# Patient Record
Sex: FEMALE | Race: BLACK OR AFRICAN AMERICAN | Employment: FULL TIME | RURAL
[De-identification: names, ages, dates, MRNs, and addresses within clinical notes are randomized per-mention and may not be internally consistent; named-entity substitution may affect disease eponyms.]

---

## 2017-01-05 ENCOUNTER — TELEPHONE (OUTPATIENT)
Dept: FAMILY MEDICINE CLINIC | Age: 57
End: 2017-01-05

## 2017-01-05 NOTE — TELEPHONE ENCOUNTER
Patient called requesting a call back. States it is very important, did not get any other info.     Patient's phone number: [phone]

## 2017-01-06 ENCOUNTER — OFFICE VISIT (OUTPATIENT)
Dept: FAMILY MEDICINE CLINIC | Age: 57
End: 2017-01-06

## 2017-01-06 ENCOUNTER — HOSPITAL ENCOUNTER (OUTPATIENT)
Dept: CT IMAGING | Age: 57
Discharge: HOME | End: 2017-01-06
Attending: FAMILY MEDICINE
Payer: COMMERCIAL

## 2017-01-06 VITALS
HEART RATE: 64 BPM | HEIGHT: 67 IN | WEIGHT: 215.8 LBS | RESPIRATION RATE: 20 BRPM | OXYGEN SATURATION: 95 % | BODY MASS INDEX: 33.87 KG/M2 | SYSTOLIC BLOOD PRESSURE: 133 MMHG | DIASTOLIC BLOOD PRESSURE: 70 MMHG | TEMPERATURE: 97.9 F

## 2017-01-06 DIAGNOSIS — R55 SYNCOPE, UNSPECIFIED SYNCOPE TYPE: ICD-10-CM

## 2017-01-06 DIAGNOSIS — Z79.899 ENCOUNTER FOR LONG-TERM CURRENT USE OF MEDICATION: ICD-10-CM

## 2017-01-06 DIAGNOSIS — M21.619 BUNION OF GREAT TOE: ICD-10-CM

## 2017-01-06 DIAGNOSIS — S52.501D CLOSED FRACTURE OF DISTAL END OF RIGHT RADIUS WITH ROUTINE HEALING, UNSPECIFIED FRACTURE MORPHOLOGY, SUBSEQUENT ENCOUNTER: ICD-10-CM

## 2017-01-06 DIAGNOSIS — E55.9 VITAMIN D DEFICIENCY: ICD-10-CM

## 2017-01-06 DIAGNOSIS — E78.00 HYPERCHOLESTEROLEMIA: ICD-10-CM

## 2017-01-06 DIAGNOSIS — E04.9 THYROID GOITER: ICD-10-CM

## 2017-01-06 LAB
BILIRUB UR QL STRIP: NEGATIVE
GLUCOSE UR-MCNC: NORMAL MG/DL
KETONES P FAST UR STRIP-MCNC: NORMAL MG/DL
PH UR STRIP: 5 [PH] (ref 4.6–8)
PROT UR QL STRIP: NEGATIVE MG/DL
SP GR UR STRIP: 1.02 (ref 1–1.03)
UA UROBILINOGEN AMB POC: NORMAL (ref 0.2–1)
URINALYSIS CLARITY POC: CLEAR
URINALYSIS COLOR POC: YELLOW
URINE BLOOD POC: NEGATIVE
URINE LEUKOCYTES POC: NEGATIVE
URINE NITRITES POC: NEGATIVE

## 2017-01-06 PROCEDURE — 70470 CT HEAD/BRAIN W/O & W/DYE: CPT

## 2017-01-06 PROCEDURE — 74011636320 HC RX REV CODE- 636/320: Performed by: RADIOLOGY

## 2017-01-06 RX ORDER — KETOROLAC TROMETHAMINE 10 MG/1
10 TABLET, FILM COATED ORAL
COMMUNITY
Start: 2017-01-01 | End: 2017-10-18 | Stop reason: ALTCHOICE

## 2017-01-06 RX ADMIN — IOPAMIDOL 100 ML: 755 INJECTION, SOLUTION INTRAVENOUS at 16:00

## 2017-01-06 NOTE — PATIENT INSTRUCTIONS
Preventing Outdoor Falls: Care Instructions  Your Care Instructions  Worries about falls don't need to keep you indoors. Outdoor activities like walking have big benefits for your health. You will need to watch your step and learn a few safety measures. If you are worried about having a fall outdoors, ask your doctor about exercises, classes, or physical therapy that may help. You can learn ways to gain strength, flexibility, and balance. Ask if it might help to use a cane or walker. You can make your time outdoors safer with a few simple measures. Follow-up care is a key part of your treatment and safety. Be sure to make and go to all appointments, and call your doctor if you are having problems. It's also a good idea to know your test results and keep a list of the medicines you take. How can you prevent falls outdoors? · Wear shoes with firm soles and low heels. If you have to walk on an icy surface, use grippers that can be worn over your shoes in bad weather. · Be extra careful if weather is bad. Walk on the grass when the sidewalks are slick. If you live in a place that gets snow and ice in the winter, sprinkle salt on slippery stairs and sidewalks. · Be careful getting on or off buses and trains or getting in and out of cars. If handrails are available, use them. · Be careful when you cross the street. Look for crosswalks or places where curb cuts or ramps are present. · Try not to hurry, especially if you are carrying something. · Be cautious in parking lots or garages. There may be curbs or changes in pavement, or the height of the pavement may vary. · Make sure to wear the correct eyeglasses, if you need them. Reading glasses or bifocals can make it harder to see hazards that might be in your way. · If you are walking outdoors for exercise, try to:  ¨ Walk in well-lighted, well-maintained areas. These include high school or college tracks, shopping malls, and public spaces.   ¨ Walk with a partner. ¨ Watch out for cracked sidewalks, curbs, changes in the height of the pavement, exposed tree roots, and debris such as fallen leaves or branches. Where can you learn more? Go to http://eulogio-lior.info/. Enter W571 in the search box to learn more about \"Preventing Outdoor Falls: Care Instructions. \"  Current as of: August 4, 2016  Content Version: 11.1  © 0783-2377 [employer]. Care instructions adapted under license by Interact[employer] (which disclaims liability or warranty for this information). If you have questions about a medical condition or this instruction, always ask your healthcare professional. [place] [address] any warranty or liability for your use of this information.

## 2017-01-06 NOTE — PROGRESS NOTES
Identified pt with two pt identifiers(name and ). Chief Complaint   Patient presents with    Fall     17 ED [place] - she broke rt wrist -     Pre-op Exam     Dr Lorie Verdugo 17 at [facility] 86 Loss     she has been having trouble with memory - has been getting worse    Loss of Consciousness     syncopy X 3         Health Maintenance Due   Topic    Hepatitis C Screening     Pneumococcal 19-64 Medium Risk (1 of 1 - PPSV23)    FOOT EXAM Q1     HEMOGLOBIN A1C Q6M        Wt Readings from Last 3 Encounters:   17 215 lb 12.8 oz (97.9 kg)   16 211 lb (95.7 kg)   16 214 lb 9.6 oz (97.3 kg)     Temp Readings from Last 3 Encounters:   17 97.9 °F (36.6 °C) (Oral)   16 97.7 °F (36.5 °C) (Oral)   16 97.9 °F (36.6 °C) (Oral)     BP Readings from Last 3 Encounters:   17 133/70   16 117/73   16 114/60     Pulse Readings from Last 3 Encounters:   17 64   16 69   16 85         Learning Assessment:  :     Learning Assessment 2014   PRIMARY LEARNER Patient   HIGHEST LEVEL OF EDUCATION - PRIMARY LEARNER  2 YEARS OF COLLEGE   BARRIERS PRIMARY LEARNER NONE   CO-LEARNER CAREGIVER No   PRIMARY LANGUAGE ENGLISH   LEARNER PREFERENCE PRIMARY DEMONSTRATION   ANSWERED BY self   RELATIONSHIP SELF       Depression Screening:  :     PHQ 2 / 9, over the last two weeks 2017   Little interest or pleasure in doing things Not at all   Feeling down, depressed or hopeless Not at all   Total Score PHQ 2 0       Fall Risk Assessment:  :     Fall Risk Assessment, last 12 mths 2017   Able to walk? Yes   Fall in past 12 months? Yes   Fall with injury? Yes   Number of falls in past 12 months 3   Fall Risk Score 4       Abuse Screening:  :     Abuse Screening Questionnaire 2017   Do you ever feel afraid of your partner? N N N   Are you in a relationship with someone who physically or mentally threatens you?  N N N   Is it safe for you to go home? Ryder Lopez       Coordination of Care Questionnaire:  :     1) Have you been to an emergency room, urgent care clinic since your last visit? yes [address] 1/1/17  Hospitalized since your last visit? no             2) Have you seen or consulted any other health care providers outside of [address] [place] [address] since your last visit? yes  Dr Levon Webb 1/4/17 (Include any pap smears or colon screenings in this section.)    3) Do you have an Advance Directive on file? no  Are you interested in receiving information about Advance Directives? Yes paperwork given and explained    Reviewed record in preparation for visit and have obtained necessary documentation. Medication reconciliation up to date and corrected with patient at this time. EEG - Monday [address] [place] [address] at 8:45am be there at 8:15am - hair clean no oils  CT head - today at 3pm be there at 2:30pm  No advil.

## 2017-01-06 NOTE — PROGRESS NOTES
HISTORY OF PRESENT ILLNESS  Tika Neumann is a 60 y.o. female. HPI  FU chronic med conditions:  DM, HTN, thyroid nodule. She has been having syncopal spells in last 9 months x 3. Most recently 1/1 fell and broke right wrist.  Scheduled for ORIF surgery 1/12/17. Seen at [facility] ER. No labs, no EKG. She has no signs of feeling faint. Unwitnessed x 2. She wakes up on the floor. No loss of bowel/ bladder function. No new headaches. Her granddaughter witnessed the most recent faint and did not see any abnormal activity. She wakes up confused, wondering where and what happened. Clears up in minutes. Denies palpitations or CP. Pt saw ENDO in Sept. Missed FU visit in December to check on thyroid nodule. Review of Systems   Musculoskeletal: Positive for falls and joint pain. Neurological: Positive for loss of consciousness. All other systems reviewed and are negative. Past Medical History   Diagnosis Date    Diabetes ([facility] [address] 11/8/2010    GERD (gastroesophageal reflux disease)     Heart murmur 11/8/2010    Sleep apnea 11/8/2010     Past Surgical History   Procedure Laterality Date    Hx heent       uvuloplasty    Hx hysterectomy       Current Outpatient Prescriptions   Medication Sig Dispense Refill    GLYXAMBI 25-5 mg tab TAKE ONE TABLET BY MOUTH ONCE DAILY 30 Tab 0    omeprazole (PRILOSEC) 20 mg capsule TAKE ONE CAPSULE BY MOUTH ONCE DAILY 30 Cap 5    VITAMIN D2 50,000 unit capsule TAKE ONE CAPSULE BY MOUTH TWICE A WEEK 9 Cap 3    pravastatin (PRAVACHOL) 40 mg tablet Take 1 Tab by mouth nightly. Indications: HYPERCHOLESTEROLEMIA 90 Tab 1    levocetirizine (XYZAL) 5 mg tablet Take 1 Tab by mouth daily. Indications: SEASONAL ALLERGIC RHINITIS 30 Tab 12    glipiZIDE SR (GLUCOTROL) 10 mg CR tablet Take 2 Tabs by mouth daily. 60 Tab 5    losartan (COZAAR) 25 mg tablet Take  by mouth daily.       BD AUTOSHIELD DUO PEN NEEDLE 30 gauge x 3/16\" ndle       glucose blood VI test strips (BLOOD GLUCOSE TEST) strip Accu Check Siomara Smart View - test strips DX E11.9 Test blood sugar twice daily. 200 Strip 3    Lancets misc Accu Check Siomara Smart View Fast Click Drum. DX E11.9 Test blood sugar twice daily 200 Each 3    insulin glargine (TOUJEO SOLOSTAR) 300 unit/mL (1.5 mL) inpn 90 Units by SubCUTAneous route daily. Indications: increase by 4 units every 2 days am sugar over 150      Blood-Glucose Meter monitoring kit ONE TOUCH ULTRA. Test once a day. .02 1 kit 0    aspirin delayed-release 81 mg tablet Take 81 mg by mouth daily.  ketorolac (TORADOL) 10 mg tablet Take 10 mg by mouth every six (6) hours as needed.  pneumococcal 23-arelis ps vaccine (PNEUMOVAX 23) 25 mcg/0.5 mL syrg One dose 1 Syringe 0    cyclobenzaprine (FLEXERIL) 10 mg tablet Take 1 Tab by mouth nightly. 30 Tab 3    triamcinolone (NASACORT) 55 mcg nasal inhaler 2 Sprays by Both Nostrils route daily. 16.5 g 5     Social History     Social History    Marital status:      Spouse name: N/A    Number of children: N/A    Years of education: N/A     Occupational History    Not on file. Social History Main Topics    Smoking status: Never Smoker    Smokeless tobacco: Never Used    Alcohol use Yes      Comment: ocassional    Drug use: No    Sexual activity: Yes     Partners: Male     Other Topics Concern    Not on file     Social History Narrative     Family History   Problem Relation Age of Onset    Hypertension Mother     Diabetes Father     Heart Disease Father     Diabetes Maternal Grandmother     Diabetes Paternal Grandmother        Visit Vitals    /70 (BP 1 Location: Left arm, BP Patient Position: Sitting)    Pulse 64    Temp 97.9 °F (36.6 °C) (Oral)    Resp 20    Ht 5' 7\" (1.702 m)    Wt 215 lb 12.8 oz (97.9 kg)    LMP 10/14/1987    SpO2 95%    BMI 33.8 kg/m2       Physical Exam   Constitutional: She is oriented to person, place, and time. She appears well-developed and well-nourished. Eyes: Conjunctivae are normal.   Cardiovascular: Normal rate, regular rhythm and normal heart sounds. Pulmonary/Chest: Effort normal and breath sounds normal.   Neurological: She is alert and oriented to person, place, and time. Vitals reviewed. Diabetic foot exam:     Left:     Filament test normal sensation with micro filament   Pulse DP: 2+ (normal)   Pulse PT: 2+ (normal)   Deformities: Yes - bunion  Right:    Filament test normal sensation with micro filament   Pulse DP: 2+ (normal)   Pulse PT: 2+ (normal)   Deformities: Yes - bunion  ASSESSMENT and PLAN    ICD-10-CM ICD-9-CM    1. Uncontrolled type 2 diabetes mellitus without complication, with long-term current use of insulin ([facility]) E11.65 250.02  DIABETES FOOT EXAM    Z79.4 V58.67 HEMOGLOBIN A1C WITH EAG   2. Pre-op exam Z01.818 V72.84 AMB POC URINALYSIS DIP STICK AUTO W/O MICRO   3. Thyroid goiter E04.9 240.9 TSH 3RD GENERATION      T4, FREE   4. Vitamin D deficiency E55.9 268.9 VITAMIN D, 25 HYDROXY   5. Hypercholesterolemia E78.00 272.0 LIPID PANEL   6. Closed fracture of distal end of right radius with routine healing, unspecified fracture morphology, subsequent encounter S52.501D V54.12    7. Syncope, unspecified syncope type R55 780.2 AMB POC EKG ROUTINE W/ 12 LEADS, INTER & REP      REFERRAL TO CARDIOLOGY      REFERRAL TO NEUROLOGY      EEG      CT HEAD W WO CONT   8. Encounter for long-term current use of medication Z79.899 V58.69 CBC WITH AUTOMATED DIFF      METABOLIC PANEL, COMPREHENSIVE   9. Bunion of great toe M21.619 727.1      Pt needs pre-op clearance for ORIF. I want her to be seen by Cardiology and Neurology for clearance. Order EEG. Rule out seizure disorder. Order CT head. Labs drawn.

## 2017-01-06 NOTE — MR AVS SNAPSHOT
Visit Information Date & Time Provider Department Dept. Phone Encounter #  
 [id number]  2:24 AM Yosef Olmstead Tal [phone] [id number] Your Appointments 1/10/2017  2:40 PM  
New Patient with Bob Pulido MD  
[facility] ([address]) Appt Note: prev Dr. Ashley Villagomez by Dr. Patel/ Gerardo pre op clearance  
 [address]  
[address] [address]  
  
    
 1/11/2017  2:00 PM  
New Patient with Johan Ness MD  
[facility] [address]) Appt Note: np eval syncopal episodes [address] [address]  
[phone]  
  
   
 [address]  
  
    
  
 1/13/2017  1:00 PM  
TRANSITIONAL CARE MANAGEMENT with Marianne Gray MD  
[address]) Appt Note: f/u from er visit [facility] [address] [facility] 860 1067 [facility][note 2] 13 [address] [facility] [address] Upcoming Health Maintenance Date Due Hepatitis C Screening 1960 Pneumococcal 19-64 Medium Risk (1 of 1 - PPSV23) 3/4/1977 FOOT EXAM Q1 8/27/2016 HEMOGLOBIN A1C Q6M 12/27/2016 MICROALBUMIN Q1 6/27/2017 LIPID PANEL Q1 6/27/2017 EYE EXAM RETINAL OR DILATED Q1 6/28/2017 BREAST CANCER SCRN MAMMOGRAM 1/8/2018 PAP AKA CERVICAL CYTOLOGY 6/27/2019 COLONOSCOPY 5/15/2025 DTaP/Tdap/Td series (2 - Td) 1/6/2027 Allergies as of 1/6/2017  Review Complete On: 1/0/3391 By: Marianne Gray MD  
  
 Severity Noted Reaction Type Reactions Lisinopril  02/05/2016    Cough Penicillin G  11/08/2010    Swelling Current Immunizations  Reviewed on 1/6/2017 No immunizations on file.   
  
 Reviewed by Marianne Gray MD on 1/6/2017 at  9:18 AM  
You Were Diagnosed With   
  
 Codes Comments Uncontrolled type 2 diabetes mellitus without complication, with long-term current use of insulin ([facility] [address]    -  Primary ICD-10-CM: E11.65, Z79.4 ICD-9-CM: 250.02, V58.67 Pre-op exam     ICD-10-CM: D28.565 ICD-9-CM: V72.84 Thyroid goiter     ICD-10-CM: E04.9 ICD-9-CM: 240.9 Vitamin D deficiency     ICD-10-CM: E55.9 ICD-9-CM: 268.9 Hypercholesterolemia     ICD-10-CM: E78.00 ICD-9-CM: 272.0 Closed fracture of distal end of right radius with routine healing, unspecified fracture morphology, subsequent encounter     ICD-10-CM: S52.501D ICD-9-CM: V54.12 Syncope, unspecified syncope type     ICD-10-CM: R55 
ICD-9-CM: 780.2 Encounter for long-term current use of medication     ICD-10-CM: Z79.899 ICD-9-CM: V58.69 Vitals BP Pulse Temp Resp Height(growth percentile) Weight(growth percentile) 133/70 (BP 1 Location: Left arm, BP Patient Position: Sitting) 64 97.9 °F (36.6 °C) (Oral) 20 5' 7\" (1.702 m) 215 lb 12.8 oz (97.9 kg) LMP SpO2 BMI OB Status Smoking Status 10/14/1987 95% 33.8 kg/m2 Hysterectomy Never Smoker BMI and BSA Data Body Mass Index Body Surface Area  
 33.8 kg/m 2 2.15 m 2 Preferred Pharmacy Pharmacy Name Phone [facility] [address] [place] [address] [phone] Your Updated Medication List  
  
   
This list is accurate as of: 1/6/17 10:09 AM.  Always use your most recent med list.  
  
  
  
  
 aspirin delayed-release 81 mg tablet Take 81 mg by mouth daily. BD AUTOSHIELD DUO PEN NEEDLE 30 gauge x 3/16\" Ndle Generic drug:  pen needle,diabetic dual safty Blood-Glucose Meter monitoring kit ONE TOUCH ULTRA. Test once a day. .02  
  
 cyclobenzaprine 10 mg tablet Commonly known as:  FLEXERIL Take 1 Tab by mouth nightly. glipiZIDE SR 10 mg CR tablet Commonly known as:  GLUCOTROL XL Take 2 Tabs by mouth daily. glucose blood VI test strips strip Commonly known as:  blood glucose test  
Accu Check Siomara Smart View - test strips DX E11.9 Test blood sugar twice daily. GLYXAMBI 25-5 mg Tab Generic drug:  empagliflozin-linagliptin TAKE ONE TABLET BY MOUTH ONCE DAILY  
  
 ketorolac 10 mg tablet Commonly known as:  TORADOL Take 10 mg by mouth every six (6) hours as needed. Lancets Misc Accu Check Siomara Smart View Fast Click Drum. DX E11.9 Test blood sugar twice daily  
  
 levocetirizine 5 mg tablet Commonly known as:  Havery Benita Take 1 Tab by mouth daily. Indications: SEASONAL ALLERGIC RHINITIS  
  
 losartan 25 mg tablet Commonly known as:  COZAAR Take  by mouth daily. omeprazole 20 mg capsule Commonly known as:  PRILOSEC  
TAKE ONE CAPSULE BY MOUTH ONCE DAILY pneumococcal 23-arelis ps vaccine 25 mcg/0.5 mL injection Commonly known as:  PNEUMOVAX 23 One dose  
  
 pravastatin 40 mg tablet Commonly known as:  PRAVACHOL Take 1 Tab by mouth nightly. Indications: HYPERCHOLESTEROLEMIA TOUJEO SOLOSTAR 300 unit/mL (1.5 mL) Inpn Generic drug:  insulin glargine 90 Units by SubCUTAneous route daily. Indications: increase by 4 units every 2 days am sugar over 150  
  
 triamcinolone 55 mcg nasal inhaler Commonly known as:  NASACORT  
2 Sprays by Both Nostrils route daily. VITAMIN D2 50,000 unit capsule Generic drug:  ergocalciferol TAKE ONE CAPSULE BY MOUTH TWICE A WEEK We Performed the Following AMB POC EKG ROUTINE W/ 12 LEADS, INTER & REP [28068 CPT(R)] AMB POC URINALYSIS DIP STICK AUTO W/O MICRO [52703 CPT(R)] CBC WITH AUTOMATED DIFF [97695 CPT(R)] HEMOGLOBIN A1C WITH EAG [93523 CPT(R)]  DIABETES FOOT EXAM [HM7 Custom] LIPID PANEL [85126 CPT(R)] METABOLIC PANEL, COMPREHENSIVE [46899 CPT(R)] REFERRAL TO CARDIOLOGY [BKC15 Custom] Comments:  
 Please evaluate patient for preop clearance, recent syncopal episodes. REFERRAL TO NEUROLOGY [HZA04 Custom] Comments:  
 Please evaluate patient for syncopal episodes. T4, FREE [id number] CPT(R)] TSH 3RD GENERATION [79990 CPT(R)] VITAMIN D, 25 HYDROXY [id number] CPT(R)] To-Do List   
 01/06/2017 Imaging:  CT HEAD W WO CONT   
  
 01/06/2017 Neurology:  EEG   
  
 01/06/2017 3:00 PM  
  Appointment with [address] 2 at [facility] RAD CT ([phone]) CONTRAST STUDY: 1. The patient should not eat solid food four hours before the appointment but should be encouraged to drink clear liquids. 2. The patient will require IV access for contrast administration. 3. The patient should not take  Ibuprofen (Advil, Motrin, etc.) and Naproxen Sodium (Aleve, etc.)  on the day of the exam. Stopping non-steroidal anti-inflammatory agents (NSAIDs) like Ibuprofen decreases the risk of kidney damage from the x-ray contrast (dye). 4. Bring any non [facility] facility films/images pertaining to the area of interest with you on the day of appointment. 5. Bring current lab work if available(within last 90 days [facility]) ***If scheduled at [facility] iSTAT is not available, labs will need to be done before appointment*** 6. Check in at registration at least 30 minutes before appt time unless you were instructed to do otherwise. 7. If you have to drink oral contrast please pick it up any weekday prior to your appointment, if you cannot please check in 2 hrs  before appt time. 01/09/2017 8:45 AM  
  Appointment with EEG ROOM 2 [facility] at [facility] EEG ([phone]) The patient's hair must be clean without oils, mousse, and spray, no faisal or metal in the hair. Referral Information Referral ID Referred By Referred To  
  
 [id number] [facility] Not Available Visits Status Start Date End Date 1 New Request 1/6/17 1/6/18 If your referral has a status of pending review or denied, additional information will be sent to support the outcome of this decision. Referral ID Referred By Referred To [id number] [facility] Not Available Visits Status Start Date End Date 1 New Request 1/6/17 1/6/18 If your referral has a status of pending review or denied, additional information will be sent to support the outcome of this decision. Patient Instructions Preventing Outdoor Falls: Care Instructions Your Care Instructions Worries about falls don't need to keep you indoors. Outdoor activities like walking have big benefits for your health. You will need to watch your step and learn a few safety measures. If you are worried about having a fall outdoors, ask your doctor about exercises, classes, or physical therapy that may help. You can learn ways to gain strength, flexibility, and balance. Ask if it might help to use a cane or walker. You can make your time outdoors safer with a few simple measures. Follow-up care is a key part of your treatment and safety. Be sure to make and go to all appointments, and call your doctor if you are having problems. It's also a good idea to know your test results and keep a list of the medicines you take. How can you prevent falls outdoors? · Wear shoes with firm soles and low heels. If you have to walk on an icy surface, use grippers that can be worn over your shoes in bad weather. · Be extra careful if weather is bad. Walk on the grass when the sidewalks are slick. If you live in a place that gets snow and ice in the winter, sprinkle salt on slippery stairs and sidewalks. · Be careful getting on or off buses and trains or getting in and out of cars. If handrails are available, use them. · Be careful when you cross the street. Look for crosswalks or places where curb cuts or ramps are present. · Try not to hurry, especially if you are carrying something. · Be cautious in parking lots or garages. There may be curbs or changes in pavement, or the height of the pavement may vary. · Make sure to wear the correct eyeglasses, if you need them.  Reading glasses or bifocals can make it harder to see hazards that might be in your way. · If you are walking outdoors for exercise, try to: 
¨ Walk in well-lighted, well-maintained areas. These include high school or college tracks, shopping malls, and public spaces. ¨ Walk with a partner. ¨ Watch out for cracked sidewalks, curbs, changes in the height of the pavement, exposed tree roots, and debris such as fallen leaves or branches. Where can you learn more? Go to http://eulogio-lior.info/. Enter Y165 in the search box to learn more about \"Preventing Outdoor Falls: Care Instructions. \" Current as of: August 4, 2016 Content Version: 11.1 © 9574-2947 [employer] ([employer]). Care instructions adapted under license by Beat[employer] (which disclaims liability or warranty for this information). If you have questions about a medical condition or this instruction, always ask your healthcare professional. [place] [address] any warranty or liability for your use of this information. Introducing [facility]! Dear Manny Lepe: Thank you for requesting a [employer] account. Our records indicate that you already have an active ClearStar account. You can access your account anytime at [email or web address]. [email or web address] Did you know that you can access your hospital and ER discharge instructions at any time in ClearStar? You can also review all of your test results from your hospital stay or ER visit. Additional Information If you have questions, please visit the Frequently Asked Questions section of the ClearStar website at [email or web address]/. Remember, ClearStar is NOT to be used for urgent needs. For medical emergencies, dial 911. Now available from your iPhone and Android! Please provide this summary of care documentation to your next provider. Your primary care clinician is listed as Anny Thomas.  If you have any questions after today's visit, please call [phone].

## 2017-01-06 NOTE — LETTER
1/9/2017 9:30 AM 
 
Ms. Krystle Rosario [address] [facility] [address] Dear Krystle Rosario: 
 
Please find your most recent results below. Resulted Orders CT HEAD W WO CONT Narrative EXAM:  CT HEAD W WO CONT INDICATION:   fainting and confusion, R 55, COMPARISON: None. CONTRAST:   99 mL of Isovue-300. TECHNIQUE:CT of the head was performed prior to and following uneventful rapid 
bolus intravenous administration of contrast.  Brain and bone windows were 
generated. CT dose reduction was achieved through use of a standardized 
protocol tailored for this examination and automatic exposure control for dose 
modulation. FINDINGS: 
Ventricular size and configuration are normal. 
Cerebral density is normal throughout. No abnormal intracranial enhancement. No evidence of intracranial hemorrhage, mass or abnormal extra-axial fluid 
collections. No evidence of infarction. Visualized osseous structures are intact. Impression IMPRESSION: Normal head CT. RECOMMENDATIONS: 
Normal head CT scan Please call me if you have any questions: [phone] Sincerely, 
 
 
Dr Jeff Leon

## 2017-01-06 NOTE — LETTER
1/11/2017 4:29 PM 
 
Ms. Shawn Howell [address] [facility] 20 Dear Shawn Howell: 
 
Please find your most recent results below. Resulted Orders AMB POC URINALYSIS DIP STICK AUTO W/O MICRO Result Value Ref Range Color (UA POC) Yellow Clarity (UA POC) Clear Glucose (UA POC) 2+ Negative Bilirubin (UA POC) Negative Negative Ketones (UA POC) Trace Negative Specific gravity (UA POC) 1.025 1.001 - 1.035 Blood (UA POC) Negative Negative pH (UA POC) 5.0 4.6 - 8.0 Protein (UA POC) Negative Negative mg/dL Urobilinogen (UA POC) 0.2 mg/dL 0.2 - 1 Nitrites (UA POC) Negative Negative Leukocyte esterase (UA POC) Negative Negative CBC WITH AUTOMATED DIFF Result Value Ref Range WBC 5.1 3.4 - 10.8 x10E3/uL  
 RBC 5.13 3.77 - 5.28 x10E6/uL HGB 14.1 11.1 - 15.9 g/dL HCT 42.8 34.0 - 46.6 % MCV 83 79 - 97 fL  
 MCH 27.5 26.6 - 33.0 pg  
 MCHC 32.9 31.5 - 35.7 g/dL  
 RDW 13.6 12.3 - 15.4 % PLATELET 712 493 - 870 x10E3/uL NEUTROPHILS 60 % Lymphocytes 30 % MONOCYTES 8 % EOSINOPHILS 2 % BASOPHILS 0 %  
 ABS. NEUTROPHILS 3.0 1.4 - 7.0 x10E3/uL Abs Lymphocytes 1.5 0.7 - 3.1 x10E3/uL  
 ABS. MONOCYTES 0.4 0.1 - 0.9 x10E3/uL  
 ABS. EOSINOPHILS 0.1 0.0 - 0.4 x10E3/uL  
 ABS. BASOPHILS 0.0 0.0 - 0.2 x10E3/uL IMMATURE GRANULOCYTES 0 %  
 ABS. IMM. GRANS. 0.0 0.0 - 0.1 x10E3/uL Narrative Performed at:  [address]  [id number] : Nyla Llamas MD, Phone:  [phone] METABOLIC PANEL, COMPREHENSIVE Result Value Ref Range Glucose 150 (H) 65 - 99 mg/dL BUN 14 6 - 24 mg/dL Creatinine 0.78 0.57 - 1.00 mg/dL GFR est non-AA 84 >59 mL/min/1.73 GFR est AA 97 >59 mL/min/1.73  
 BUN/Creatinine ratio 18 9 - 23 Sodium 141 134 - 144 mmol/L Potassium 4.4 3.5 - 5.2 mmol/L  Chloride 102 96 - 106 mmol/L  
 CO2 23 18 - 29 mmol/L  
 Calcium 9.5 8.7 - 10.2 mg/dL Protein, total 6.9 6.0 - 8.5 g/dL Albumin 4.3 3.5 - 5.5 g/dL GLOBULIN, TOTAL 2.6 1.5 - 4.5 g/dL A-G Ratio 1.7 1.1 - 2.5 Bilirubin, total 0.5 0.0 - 1.2 mg/dL Alk. phosphatase 84 39 - 117 IU/L  
 AST 14 0 - 40 IU/L  
 ALT 22 0 - 32 IU/L Narrative Performed at:  [address]  [id number] : Shira Acuna MD, Phone:  [phone] LIPID PANEL Result Value Ref Range Cholesterol, total 178 100 - 199 mg/dL Triglyceride 127 0 - 149 mg/dL HDL Cholesterol 54 >39 mg/dL VLDL, calculated 25 5 - 40 mg/dL LDL, calculated 99 0 - 99 mg/dL Narrative Performed at:  [address]  [id number] : Shira Acuna MD, Phone:  [phone] TSH 3RD GENERATION Result Value Ref Range TSH 1.490 0.450 - 4.500 uIU/mL Narrative Performed at:  [address]  [id number] : Shira Acuna MD, Phone:  [phone] T4, FREE Result Value Ref Range T4, Free 1.25 0.82 - 1.77 ng/dL Narrative Performed at:  [address]  [id number] : Shira Acuna MD, Phone:  [phone] HEMOGLOBIN A1C WITH EAG Result Value Ref Range Hemoglobin A1c 8.9 (H) 4.8 - 5.6 % Comment:  
            Pre-diabetes: 5.7 - 6.4 Diabetes: >6.4 Glycemic control for adults with diabetes: <7.0 Estimated average glucose 209 mg/dL Narrative Performed at:  [address]  [id number] : Shira Acuna MD, Phone:  [phone] VITAMIN D, 25 HYDROXY Result Value Ref Range VITAMIN D, 25-HYDROXY 40.5 30.0 - 100.0 ng/mL    Comment:  
   Vitamin D deficiency has been defined by the [facility] practice guideline as a 
 level of serum 25-OH vitamin D less than 20 ng/mL (1,2). The Endocrine Society went on to further define vitamin D 
insufficiency as a level between 21 and 29 ng/mL (2). 1. IOM ([place] of Medicine). 2010. Dietary reference 
   intakes for calcium and D. [facility]: The 
   VtagO. 2. Allison MF, Jim NC, Lawanda CARDENAS, et al. 
   Evaluation, treatment, and prevention of vitamin D 
   deficiency: an Endocrine Society clinical practice 
   guideline. JCEM. 2011 Jul; 96(7):1911-30. Narrative Performed at:  [address]  [id number] : Shelbie Pope MD, Phone:  [phone] CVD REPORT Result Value Ref Range INTERPRETATION Note Comment:  
   Supplement report is available. Narrative Performed at:  [address] [place] [address]  [id number] : Angelito Fermin PhD, Phone:  [phone] RECOMMENDATIONS: 
Labs show normal blood counts, normal kidney and liver tests.  A1C remains high indicating poor diabetes control but it is lower compared to 6 months ago. Thee Childs thyroid tests and Vit D level.  Good cholesterol numbers. Please share results with ENDOCRINOLOGIST. Please call me if you have any questions: [phone] Sincerely, Leny Barnett MD

## 2017-01-07 ENCOUNTER — TELEPHONE (OUTPATIENT)
Dept: FAMILY MEDICINE CLINIC | Age: 57
End: 2017-01-07

## 2017-01-09 ENCOUNTER — HOSPITAL ENCOUNTER (OUTPATIENT)
Dept: NEUROLOGY | Age: 57
Discharge: HOME | End: 2017-01-09
Attending: FAMILY MEDICINE
Payer: COMMERCIAL

## 2017-01-09 DIAGNOSIS — R55 SYNCOPE, UNSPECIFIED SYNCOPE TYPE: ICD-10-CM

## 2017-01-09 LAB
25(OH)D3+25(OH)D2 SERPL-MCNC: 40.5 NG/ML (ref 30–100)
ALBUMIN SERPL-MCNC: 4.3 G/DL (ref 3.5–5.5)
ALBUMIN/GLOB SERPL: 1.7 {RATIO} (ref 1.1–2.5)
ALP SERPL-CCNC: 84 IU/L (ref 39–117)
ALT SERPL-CCNC: 22 IU/L (ref 0–32)
AST SERPL-CCNC: 14 IU/L (ref 0–40)
BASOPHILS # BLD AUTO: 0 X10E3/UL (ref 0–0.2)
BASOPHILS NFR BLD AUTO: 0 %
BILIRUB SERPL-MCNC: 0.5 MG/DL (ref 0–1.2)
BUN SERPL-MCNC: 14 MG/DL (ref 6–24)
BUN/CREAT SERPL: 18 (ref 9–23)
CALCIUM SERPL-MCNC: 9.5 MG/DL (ref 8.7–10.2)
CHLORIDE SERPL-SCNC: 102 MMOL/L (ref 96–106)
CHOLEST SERPL-MCNC: 178 MG/DL (ref 100–199)
CO2 SERPL-SCNC: 23 MMOL/L (ref 18–29)
CREAT SERPL-MCNC: 0.78 MG/DL (ref 0.57–1)
EOSINOPHIL # BLD AUTO: 0.1 X10E3/UL (ref 0–0.4)
EOSINOPHIL NFR BLD AUTO: 2 %
ERYTHROCYTE [DISTWIDTH] IN BLOOD BY AUTOMATED COUNT: 13.6 % (ref 12.3–15.4)
EST. AVERAGE GLUCOSE BLD GHB EST-MCNC: 209 MG/DL
GLOBULIN SER CALC-MCNC: 2.6 G/DL (ref 1.5–4.5)
GLUCOSE SERPL-MCNC: 150 MG/DL (ref 65–99)
HBA1C MFR BLD: 8.9 % (ref 4.8–5.6)
HCT VFR BLD AUTO: 42.8 % (ref 34–46.6)
HDLC SERPL-MCNC: 54 MG/DL
HGB BLD-MCNC: 14.1 G/DL (ref 11.1–15.9)
IMM GRANULOCYTES # BLD: 0 X10E3/UL (ref 0–0.1)
IMM GRANULOCYTES NFR BLD: 0 %
INTERPRETATION, 910389: NORMAL
LDLC SERPL CALC-MCNC: 99 MG/DL (ref 0–99)
LYMPHOCYTES # BLD AUTO: 1.5 X10E3/UL (ref 0.7–3.1)
LYMPHOCYTES NFR BLD AUTO: 30 %
MCH RBC QN AUTO: 27.5 PG (ref 26.6–33)
MCHC RBC AUTO-ENTMCNC: 32.9 G/DL (ref 31.5–35.7)
MCV RBC AUTO: 83 FL (ref 79–97)
MONOCYTES # BLD AUTO: 0.4 X10E3/UL (ref 0.1–0.9)
MONOCYTES NFR BLD AUTO: 8 %
NEUTROPHILS # BLD AUTO: 3 X10E3/UL (ref 1.4–7)
NEUTROPHILS NFR BLD AUTO: 60 %
PLATELET # BLD AUTO: 307 X10E3/UL (ref 150–379)
POTASSIUM SERPL-SCNC: 4.4 MMOL/L (ref 3.5–5.2)
PROT SERPL-MCNC: 6.9 G/DL (ref 6–8.5)
RBC # BLD AUTO: 5.13 X10E6/UL (ref 3.77–5.28)
SODIUM SERPL-SCNC: 141 MMOL/L (ref 134–144)
T4 FREE SERPL-MCNC: 1.25 NG/DL (ref 0.82–1.77)
TRIGL SERPL-MCNC: 127 MG/DL (ref 0–149)
TSH SERPL DL<=0.005 MIU/L-ACNC: 1.49 UIU/ML (ref 0.45–4.5)
VLDLC SERPL CALC-MCNC: 25 MG/DL (ref 5–40)
WBC # BLD AUTO: 5.1 X10E3/UL (ref 3.4–10.8)

## 2017-01-09 PROCEDURE — 95816 EEG AWAKE AND DROWSY: CPT

## 2017-01-10 ENCOUNTER — OFFICE VISIT (OUTPATIENT)
Dept: CARDIOLOGY CLINIC | Age: 57
End: 2017-01-10

## 2017-01-10 ENCOUNTER — TELEPHONE (OUTPATIENT)
Dept: FAMILY MEDICINE CLINIC | Age: 57
End: 2017-01-10

## 2017-01-10 VITALS
OXYGEN SATURATION: 98 % | HEART RATE: 80 BPM | DIASTOLIC BLOOD PRESSURE: 80 MMHG | BODY MASS INDEX: 33.74 KG/M2 | SYSTOLIC BLOOD PRESSURE: 126 MMHG | HEIGHT: 67 IN | WEIGHT: 215 LBS

## 2017-01-10 DIAGNOSIS — Z01.810 PREOP CARDIOVASCULAR EXAM: ICD-10-CM

## 2017-01-10 DIAGNOSIS — R01.1 HEART MURMUR: ICD-10-CM

## 2017-01-10 DIAGNOSIS — E78.00 HYPERCHOLESTEROLEMIA: ICD-10-CM

## 2017-01-10 DIAGNOSIS — R00.2 PALPITATIONS: ICD-10-CM

## 2017-01-10 DIAGNOSIS — R55 SYNCOPE, UNSPECIFIED SYNCOPE TYPE: Primary | ICD-10-CM

## 2017-01-10 NOTE — PROCEDURES
Misbah Becerra [facility] 79   [address] [facility], [address]   ROUTINE EEG REPORT       Name:  Allan Mace   MR#:  [id number]   :  1960   Account #:  [de-identified]    Date of Procedure:  2017   Date of Adm:  2017       REQUESTING PHYSICIAN: Sid Campos. Armin Kitchen MD.    INDICATIONS: An EEG is requested in this 38-year-old woman with   syncope to evaluate for epileptiform abnormalities. MEDICATIONS LISTED AS INCLUDIN. Flexeril. 2. Prilosec. 3. Toradol. 4. Xyzal.   5. Glucotrol. 6. Aspirin. 7. Toujeo. DESCRIPTION: This tracing is obtained during the awake state. During wakefulness, there are brief intermittent runs of posteriorly   dominant symmetrical low to medium amplitude, 10 cycle/sec   activities, which attenuate with eye opening. Lower voltage, faster   frequency activities are seen symmetrically over the anterior head   regions. Hyperventilation is not performed secondary to age and medical   history. Intermittent photic stimulation induces symmetric posterior driving   responses. Sleep is not attained.     INTERPRETATION: This EEG recorded during the awake state is   normal. No epileptiform abnormalities are seen; however, should   seizures remain a clinical concern, a repeat tracing following sleep   deprivation may add additional sensitivity to the test.        Malgorzata Mackay MD      SLE / SM   D:  2017   14:40   T:  01/10/2017   07:18   Job #:  [id number]

## 2017-01-10 NOTE — MR AVS SNAPSHOT
Visit Information Date & Time Provider Department Dept. Phone Encounter #  
 1/10/2017  2:40 PM Maria Luz Tabor MD [facility] Hector Philippe [phone] [id number] Follow-up Instructions Return in about 4 weeks (around 2/7/2017). Your Appointments 1/11/2017  2:00 PM  
New Patient with Jeannine Diallo MD  
[address] [facility] [place] [facility]) Appt Note: np eval syncopal episodes [address] [place] [address] [address]  
[phone]  
  
   
 [address]  
  
    
  
 1/13/2017  1:00 PM  
TRANSITIONAL CARE MANAGEMENT with Louisa Veloz MD  
[address] [facility]) Appt Note: f/u from er visit [address] [facility] 860 [facility] [address] [place] [address] Upcoming Health Maintenance Date Due Hepatitis C Screening 1960 Pneumococcal 19-64 Medium Risk (1 of 1 - PPSV23) 3/4/1977 MICROALBUMIN Q1 6/27/2017 EYE EXAM RETINAL OR DILATED Q1 6/28/2017 HEMOGLOBIN A1C Q6M 7/6/2017 FOOT EXAM Q1 1/6/2018 LIPID PANEL Q1 1/6/2018 BREAST CANCER SCRN MAMMOGRAM 1/8/2018 PAP AKA CERVICAL CYTOLOGY 6/27/2019 COLONOSCOPY 5/15/2025 DTaP/Tdap/Td series (2 - Td) 1/6/2027 Allergies as of 1/10/2017  Review Complete On: 1/10/2017 By: Maria Luz Tabor MD  
  
 Severity Noted Reaction Type Reactions Lisinopril  02/05/2016    Cough Penicillin G  11/08/2010    Swelling Current Immunizations  Reviewed on 1/6/2017 No immunizations on file. Not reviewed this visit You Were Diagnosed With   
  
 Codes Comments Hypercholesterolemia    -  Primary ICD-10-CM: E78.00 ICD-9-CM: 272.0 Heart murmur     ICD-10-CM: R01.1 ICD-9-CM: 925. 2 Vitals BP Pulse Height(growth percentile) Weight(growth percentile) LMP SpO2 126/80 80 5' 7\" (1.702 m) 215 lb (97.5 kg) 10/14/1987 98% BMI OB Status Smoking Status 33.67 kg/m2 Hysterectomy Never Smoker Vitals History BMI and BSA Data Body Mass Index Body Surface Area  
 33.67 kg/m 2 2.15 m 2 Preferred Pharmacy Pharmacy Name Phone [facility] [address] [facility] [place] [phone] Your Updated Medication List  
  
   
This list is accurate as of: 1/10/17  3:13 PM.  Always use your most recent med list.  
  
  
  
  
 aspirin delayed-release 81 mg tablet Take 81 mg by mouth daily. BD AUTOSHIELD DUO PEN NEEDLE 30 gauge x 3/16\" Ndle Generic drug:  pen needle,diabetic dual safty Blood-Glucose Meter monitoring kit ONE TOUCH ULTRA. Test once a day. .02  
  
 cyclobenzaprine 10 mg tablet Commonly known as:  FLEXERIL Take 1 Tab by mouth nightly. glipiZIDE SR 10 mg CR tablet Commonly known as:  GLUCOTROL XL Take 2 Tabs by mouth daily. glucose blood VI test strips strip Commonly known as:  blood glucose test  
Accu Check Siomara Smart View - test strips DX E11.9 Test blood sugar twice daily. GLYXAMBI 25-5 mg Tab Generic drug:  empagliflozin-linagliptin TAKE ONE TABLET BY MOUTH ONCE DAILY  
  
 ketorolac 10 mg tablet Commonly known as:  TORADOL Take 10 mg by mouth every six (6) hours as needed. Lancets Misc Accu Check Siomara Smart View Fast Click Drum. DX E11.9 Test blood sugar twice daily  
  
 levocetirizine 5 mg tablet Commonly known as:  [place] Take 1 Tab by mouth daily. Indications: SEASONAL ALLERGIC RHINITIS  
  
 losartan 25 mg tablet Commonly known as:  COZAAR Take  by mouth daily. omeprazole 20 mg capsule Commonly known as:  PRILOSEC  
TAKE ONE CAPSULE BY MOUTH ONCE DAILY pneumococcal 23-arelis ps vaccine 25 mcg/0.5 mL injection Commonly known as:  PNEUMOVAX 23 One dose  
  
 pravastatin 40 mg tablet Commonly known as:  PRAVACHOL  
 Take 1 Tab by mouth nightly. Indications: HYPERCHOLESTEROLEMIA TOUJEO SOLOSTAR 300 unit/mL (1.5 mL) Inpn Generic drug:  insulin glargine 100 Units by SubCUTAneous route daily. triamcinolone 55 mcg nasal inhaler Commonly known as:  NASACORT  
2 Sprays by Both Nostrils route daily. VITAMIN D2 50,000 unit capsule Generic drug:  ergocalciferol TAKE ONE CAPSULE BY MOUTH TWICE A WEEK Follow-up Instructions Return in about 4 weeks (around 2/7/2017). Introducing [facility] & [facility] SERVICES! Dear Cordell Eason: Thank you for requesting a [employer] account. Our records indicate that you already have an active [employer] account. You can access your account anytime at [email or web address] Did you know that you can access your hospital and ER discharge instructions at any time in [employer]? You can also review all of your test results from your hospital stay or ER visit. Additional Information If you have questions, please visit the Frequently Asked Questions section of the [employer] website at [email or web address] Remember, [employer] is NOT to be used for urgent needs. For medical emergencies, dial 911. Now available from your iPhone and Android! Please provide this summary of care documentation to your next provider. Your primary care clinician is listed as Ivone Ball. If you have any questions after today's visit, please call [phone].

## 2017-01-10 NOTE — PROGRESS NOTES
HISTORY OF PRESENT ILLNESS  Alexandre Roy is a 60 y.o. female     SUMMARY:   Problem List  Date Reviewed: 1/10/2017          Codes Class Noted    Osteopenia ICD-10-CM: M85.80  ICD-9-CM: 733.90  5/15/2015        Type II diabetes mellitus, uncontrolled ([facility] ICD-10-CM: E11.65  ICD-9-CM: 250.02  5/15/2015        Hypercholesterolemia ICD-10-CM: E78.00  ICD-9-CM: 272.0  4/28/2013    Overview Signed 6/4/2013  2:41 PM by MD CHRIST Quintero. FLP (4/20/13): Tot 196, , HDL 53,  (no Rx). Thyroid goiter ICD-10-CM: E04.9  ICD-9-CM: 240.9  4/19/2013    Overview Addendum 4/44/0224 72:63 AM by Miller Li MD     2012. Dr. Theo Silva. Biopsy 2013 - benign. GERD (gastroesophageal reflux disease) ICD-10-CM: K21.9  ICD-9-CM: 530.81  6/22/2012        Iritis ICD-10-CM: H20.9  ICD-9-CM: 364.3  12/23/2011    Overview Signed 96/15/3716 21:96 AM by MD Dr. Samantha Colbert             DDD (degenerative disc disease), cervical ICD-10-CM: M50.30  ICD-9-CM: 722.4  12/23/2011    Overview Signed 26/92/9812 86:06 AM by Miller Li MD     XR 10/2011             Vitamin D deficiency ICD-10-CM: E55.9  ICD-9-CM: 268.9  12/23/2011        Sleep apnea ICD-10-CM: G47.30  ICD-9-CM: 780.57  11/8/2010        Diabetes mellitus ([facility] ICD-10-CM: E11.9  ICD-9-CM: 250.00  11/8/2010        Heart murmur ICD-10-CM: R01.1  ICD-9-CM: 785.2  11/8/2010    Overview Addendum 6/4/2013  2:40 PM by MD CHRIST Quintero. Exercise Echo (3/3/8):  EF 60%. No ischemia. B. Cho (2/25/8):  EF 65%, DD. Mildly dilated LA. Mild TR/KS. Current Outpatient Prescriptions on File Prior to Visit   Medication Sig    ketorolac (TORADOL) 10 mg tablet Take 10 mg by mouth every six (6) hours as needed.     GLYXAMBI 25-5 mg tab TAKE ONE TABLET BY MOUTH ONCE DAILY    omeprazole (PRILOSEC) 20 mg capsule TAKE ONE CAPSULE BY MOUTH ONCE DAILY    VITAMIN D2 50,000 unit capsule TAKE ONE CAPSULE BY MOUTH TWICE A WEEK    pravastatin (PRAVACHOL) 40 mg tablet Take 1 Tab by mouth nightly. Indications: HYPERCHOLESTEROLEMIA    levocetirizine (XYZAL) 5 mg tablet Take 1 Tab by mouth daily. Indications: SEASONAL ALLERGIC RHINITIS    glipiZIDE SR (GLUCOTROL) 10 mg CR tablet Take 2 Tabs by mouth daily.  losartan (COZAAR) 25 mg tablet Take  by mouth daily.  BD AUTOSHIELD DUO PEN NEEDLE 30 gauge x 3/16\" ndle     cyclobenzaprine (FLEXERIL) 10 mg tablet Take 1 Tab by mouth nightly. (Patient taking differently: Take 10 mg by mouth nightly. prn)    glucose blood VI test strips (BLOOD GLUCOSE TEST) strip Accu Check Siomara Smart View - test strips DX E11.9 Test blood sugar twice daily.  Lancets misc Accu Check Siomara Smart View Fast Click Drum. DX E11.9 Test blood sugar twice daily    triamcinolone (NASACORT) 55 mcg nasal inhaler 2 Sprays by Both Nostrils route daily. (Patient taking differently: 2 Sprays by Both Nostrils route daily. prn)    insulin glargine (TOUJEO SOLOSTAR) 300 unit/mL (1.5 mL) inpn 100 Units by SubCUTAneous route daily.  Blood-Glucose Meter monitoring kit ONE TOUCH ULTRA. Test once a day. .02    aspirin delayed-release 81 mg tablet Take 81 mg by mouth daily.  pneumococcal 23-arelis ps vaccine (PNEUMOVAX 23) 25 mcg/0.5 mL syrg One dose     No current facility-administered medications on file prior to visit. CARDIOLOGY STUDIES TO DATE:   (3/3/8):  Stress echo EF 60%. No ischemia. (2/25/8):  EF 65%, DD. Mildly dilated LA. Mild TR/OK. Chief Complaint   Patient presents with    Dizziness     HPI :  Ms. Vito Castellano is a 60year old referred by Marianne Gray MD for evaluation of syncope and because she needs to have surgery on her right arm after her most recent event. She has had three events over the last three or four months, all of them have occurred at night.  She has no warning and has either found herself or been found by family members on the floor minimally responsive with no signs of seizure and no injury until this episode early New Years day. It turns out she has untreated sleep apnea, and she has not checked her sugars during any of these spells. In addition she has had multiple episodes where her family members have found her wondering around at night mumbling incoherently and very difficult to arouse. She is active but gets no regular exercise. She has diabetes and hyperlipidemia. She does not smoke, and family history is noncontributory. CARDIAC ROS:   negative for chest pain, dyspnea, palpitations, syncope, orthopnea, paroxysmal nocturnal dyspnea, exertional chest pressure/discomfort, claudication, lower extremity edema    Family History   Problem Relation Age of Onset    Hypertension Mother     Diabetes Father     Heart Disease Father     Diabetes Maternal Grandmother     Diabetes Paternal Grandmother        Past Medical History   Diagnosis Date    Diabetes ([facility] [address] [facility] 11/8/2010    GERD (gastroesophageal reflux disease)     Heart murmur 11/8/2010    Sleep apnea 11/8/2010       GENERAL ROS:  A comprehensive review of systems was negative except for that written in the HPI. Visit Vitals    /80    Pulse 80    Ht 5' 7\" (1.702 m)    Wt 215 lb (97.5 kg)    LMP 10/14/1987    SpO2 98%    BMI 33.67 kg/m2       Wt Readings from Last 3 Encounters:   01/10/17 215 lb (97.5 kg)   01/06/17 215 lb 12.8 oz (97.9 kg)   06/27/16 211 lb (95.7 kg)            BP Readings from Last 3 Encounters:   01/10/17 126/80   01/06/17 133/70   06/27/16 117/73       PHYSICAL EXAM  General appearance: alert, cooperative, no distress, appears stated age  Neurologic: Alert and oriented X 3  Neck: supple, symmetrical, trachea midline, no adenopathy, no carotid bruit and no JVD  Lungs: clear to auscultation bilaterally  Heart: regular rate and rhythm, S1, S2 normal, no murmur, click, rub or gallop  Abdomen: soft, non-tender.  Bowel sounds normal. No masses,  no organomegaly  Extremities: extremities normal, atraumatic, no cyanosis or edema  Pulses: 2+ and symmetric    Lab Results   Component Value Date/Time    Cholesterol, total 178 01/06/2017 09:33 AM    Cholesterol, total 209 06/27/2016 10:46 AM    Cholesterol, total 206 05/15/2015 10:28 AM    Cholesterol, total 206 11/24/2014 10:16 AM    Cholesterol, total 212 06/23/2014 10:30 AM    HDL Cholesterol 54 01/06/2017 09:33 AM    HDL Cholesterol 63 06/27/2016 10:46 AM    HDL Cholesterol 60 05/15/2015 10:28 AM    HDL Cholesterol 63 11/24/2014 10:16 AM    HDL Cholesterol 61 06/23/2014 10:30 AM    LDL, calculated 99 01/06/2017 09:33 AM    LDL, calculated 125 06/27/2016 10:46 AM    LDL, calculated 124 05/15/2015 10:28 AM    LDL, calculated 123 11/24/2014 10:16 AM    LDL, calculated 128 06/23/2014 10:30 AM    Triglyceride 127 01/06/2017 09:33 AM    Triglyceride 103 06/27/2016 10:46 AM    Triglyceride 109 05/15/2015 10:28 AM    Triglyceride 100 11/24/2014 10:16 AM    Triglyceride 116 06/23/2014 10:30 AM    CHOL/HDL Ratio 3.0 11/10/2010 10:47 AM     ASSESSMENT  I do not think Ms. Miller Elias is having any cardiac issues related to these spells, and she should be fine from a cardiac perspective for arm surgery without special precautions or further cardiac testing. I suspect she either has some sort of sleep disorder or is having low blood sugars given her description. She is seeing a neurologist tomorrow, so I told her to make sure to tell him what she told me. current treatment plan is effective, no change in therapy  lab results and schedule of future lab studies reviewed with patient  reviewed diet, exercise and weight control    Encounter Diagnoses   Name Primary?  Syncope, unspecified syncope type Yes    Hypercholesterolemia     Heart murmur     Palpitations     Preop cardiovascular exam      No orders of the defined types were placed in this encounter. Follow-up Disposition:  Return if symptoms worsen or fail to improve.     Kristine Arnett MD  1/10/2017

## 2017-01-11 ENCOUNTER — TELEPHONE (OUTPATIENT)
Dept: FAMILY MEDICINE CLINIC | Age: 57
End: 2017-01-11

## 2017-01-11 ENCOUNTER — OFFICE VISIT (OUTPATIENT)
Dept: NEUROLOGY | Age: 57
End: 2017-01-11

## 2017-01-11 VITALS
WEIGHT: 210 LBS | BODY MASS INDEX: 32.89 KG/M2 | HEART RATE: 84 BPM | SYSTOLIC BLOOD PRESSURE: 128 MMHG | DIASTOLIC BLOOD PRESSURE: 72 MMHG | OXYGEN SATURATION: 98 %

## 2017-01-11 DIAGNOSIS — R55 SYNCOPE, UNSPECIFIED SYNCOPE TYPE: ICD-10-CM

## 2017-01-11 DIAGNOSIS — R41.82 ALTERED MENTAL STATUS, UNSPECIFIED: Primary | ICD-10-CM

## 2017-01-11 NOTE — PROGRESS NOTES
NEUROLOGY NEW PATIENT OFFICE CONSULTATION      1/11/2017    RE: Kathia Barahona         1960      REFERRED BY:  Liban Rasheed MD        CHIEF COMPLAINT:  This is Kathia Barahona is a 60 y.o. female right handed  who comes in for altered mental state    HPI:   Summer 2016, had dinner, fixng her grand daughters hair, went to her recliner and found herself on the floor. No witnesses. (-) tongue bite (-) incontinence. 3 months ago, patient woke up to go to the  [facility] and last thing she remembered she was already on the floor. (-) witnesses. Last Jan 1, 2017, just [facility] [place] [facility] woke up to talk to her granddaughter. Patient apparently passed out and fell and broke her R wrist. (-) chest pain (-) SOB (-) lightheaded (-) alcohol (-) lightheadedness. Missed dinner. Patient went to [place] [id number] ER where BP was said to be okay. (-) incontinence  (-) tongue bite    Patient saw a cardiloigst (Dr Leny Dimas) who said she does not need any further testing    (+) EDITH but not using CPAP. Admits to daytime sleepiness    Review of Systems   Constitutional: Negative for chills, fever and weight loss. All other systems reviewed and are negative.         Past Medical Hx  Past Medical History   Diagnosis Date    Arthritis     Diabetes ([facility] [address] 11/8/2010    GERD (gastroesophageal reflux disease)     Heart murmur 11/8/2010    Sleep apnea 11/8/2010       Social Hx  Social History     Social History    Marital status:      Spouse name: N/A    Number of children: N/A    Years of education: N/A     Social History Main Topics    Smoking status: Never Smoker    Smokeless tobacco: Never Used    Alcohol use Yes      Comment: ocassional    Drug use: No    Sexual activity: Yes     Partners: Male     Other Topics Concern    None     Social History Narrative       Family Hx  Family History   Problem Relation Age of Onset    Hypertension Mother     Diabetes Father    Giovani Lepe Heart Disease Father     Diabetes Maternal Grandmother     Diabetes Paternal Grandmother        ALLERGIES  Allergies   Allergen Reactions    Lisinopril Cough    Penicillin G Swelling       CURRENT MEDS  Current Outpatient Prescriptions   Medication Sig Dispense Refill    ketorolac (TORADOL) 10 mg tablet Take 10 mg by mouth every six (6) hours as needed.  GLYXAMBI 25-5 mg tab TAKE ONE TABLET BY MOUTH ONCE DAILY 30 Tab 0    omeprazole (PRILOSEC) 20 mg capsule TAKE ONE CAPSULE BY MOUTH ONCE DAILY 30 Cap 5    VITAMIN D2 50,000 unit capsule TAKE ONE CAPSULE BY MOUTH TWICE A WEEK 9 Cap 3    pravastatin (PRAVACHOL) 40 mg tablet Take 1 Tab by mouth nightly. Indications: HYPERCHOLESTEROLEMIA 90 Tab 1    levocetirizine (XYZAL) 5 mg tablet Take 1 Tab by mouth daily. Indications: SEASONAL ALLERGIC RHINITIS 30 Tab 12    glipiZIDE SR (GLUCOTROL) 10 mg CR tablet Take 2 Tabs by mouth daily. 60 Tab 5    losartan (COZAAR) 25 mg tablet Take  by mouth daily.  BD AUTOSHIELD DUO PEN NEEDLE 30 gauge x 3/16\" ndle       cyclobenzaprine (FLEXERIL) 10 mg tablet Take 1 Tab by mouth nightly. (Patient taking differently: Take 10 mg by mouth nightly. prn) 30 Tab 3    glucose blood VI test strips (BLOOD GLUCOSE TEST) strip Accu Check Siomara Smart View - test strips DX E11.9 Test blood sugar twice daily. 200 Strip 3    Lancets misc Accu Check Siomara Smart View Fast Click Drum. DX E11.9 Test blood sugar twice daily 200 Each 3    insulin glargine (TOUJEO SOLOSTAR) 300 unit/mL (1.5 mL) inpn 100 Units by SubCUTAneous route daily.  Blood-Glucose Meter monitoring kit ONE TOUCH ULTRA. Test once a day. .02 1 kit 0    aspirin delayed-release 81 mg tablet Take 81 mg by mouth daily.  pneumococcal 23-arelis ps vaccine (PNEUMOVAX 23) 25 mcg/0.5 mL syrg One dose 1 Syringe 0    triamcinolone (NASACORT) 55 mcg nasal inhaler 2 Sprays by Both Nostrils route daily.  (Patient taking differently: 2 Sprays by Both Nostrils route daily. prn) 16.5 g 5           PREVIOUS WORKUP: (reviewed)    EEG: WNL      IMAGING:    CT Results (recent):    Results from Hospital Encounter encounter on 01/06/17   CT HEAD W WO CONT   Narrative EXAM:  CT HEAD W WO CONT    INDICATION:   fainting and confusion, R 55,    COMPARISON: None. CONTRAST:   99 mL of Isovue-300. TECHNIQUE:CT of the head was performed prior to and following uneventful rapid  bolus intravenous administration of contrast.  Brain and bone windows were  generated. CT dose reduction was achieved through use of a standardized  protocol tailored for this examination and automatic exposure control for dose  modulation. FINDINGS:  Ventricular size and configuration are normal.  Cerebral density is normal throughout. No abnormal intracranial enhancement. No evidence of intracranial hemorrhage, mass or abnormal extra-axial fluid  collections. No evidence of infarction. Visualized osseous structures are intact. Impression IMPRESSION: Normal head CT. MRI Results (recent):      IR Results (recent):  No results found for this or any previous visit. VAS/US Results (recent):  No results found for this or any previous visit. LABS (reviewed)  Results for orders placed or performed in visit on 01/06/17   CBC WITH AUTOMATED DIFF   Result Value Ref Range    WBC 5.1 3.4 - 10.8 x10E3/uL    RBC 5.13 3.77 - 5.28 x10E6/uL    HGB 14.1 11.1 - 15.9 g/dL    HCT 42.8 34.0 - 46.6 %    MCV 83 79 - 97 fL    MCH 27.5 26.6 - 33.0 pg    MCHC 32.9 31.5 - 35.7 g/dL    RDW 13.6 12.3 - 15.4 %    PLATELET 612 261 - 576 x10E3/uL    NEUTROPHILS 60 %    Lymphocytes 30 %    MONOCYTES 8 %    EOSINOPHILS 2 %    BASOPHILS 0 %    ABS. NEUTROPHILS 3.0 1.4 - 7.0 x10E3/uL    Abs Lymphocytes 1.5 0.7 - 3.1 x10E3/uL    ABS. MONOCYTES 0.4 0.1 - 0.9 x10E3/uL    ABS. EOSINOPHILS 0.1 0.0 - 0.4 x10E3/uL    ABS. BASOPHILS 0.0 0.0 - 0.2 x10E3/uL    IMMATURE GRANULOCYTES 0 %    ABS. IMM.  GRANS. 0.0 0.0 - 0.1 M07J8/TC   METABOLIC PANEL, COMPREHENSIVE   Result Value Ref Range    Glucose 150 (H) 65 - 99 mg/dL    BUN 14 6 - 24 mg/dL    Creatinine 0.78 0.57 - 1.00 mg/dL    GFR est non-AA 84 >59 mL/min/1.73    GFR est AA 97 >59 mL/min/1.73    BUN/Creatinine ratio 18 9 - 23    Sodium 141 134 - 144 mmol/L    Potassium 4.4 3.5 - 5.2 mmol/L    Chloride 102 96 - 106 mmol/L    CO2 23 18 - 29 mmol/L    Calcium 9.5 8.7 - 10.2 mg/dL    Protein, total 6.9 6.0 - 8.5 g/dL    Albumin 4.3 3.5 - 5.5 g/dL    GLOBULIN, TOTAL 2.6 1.5 - 4.5 g/dL    A-G Ratio 1.7 1.1 - 2.5    Bilirubin, total 0.5 0.0 - 1.2 mg/dL    Alk.  phosphatase 84 39 - 117 IU/L    AST 14 0 - 40 IU/L    ALT 22 0 - 32 IU/L   LIPID PANEL   Result Value Ref Range    Cholesterol, total 178 100 - 199 mg/dL    Triglyceride 127 0 - 149 mg/dL    HDL Cholesterol 54 >39 mg/dL    VLDL, calculated 25 5 - 40 mg/dL    LDL, calculated 99 0 - 99 mg/dL   TSH 3RD GENERATION   Result Value Ref Range    TSH 1.490 0.450 - 4.500 uIU/mL   T4, FREE   Result Value Ref Range    T4, Free 1.25 0.82 - 1.77 ng/dL   HEMOGLOBIN A1C WITH EAG   Result Value Ref Range    Hemoglobin A1c 8.9 (H) 4.8 - 5.6 %    Estimated average glucose 209 mg/dL   VITAMIN D, 25 HYDROXY   Result Value Ref Range    VITAMIN D, 25-HYDROXY 40.5 30.0 - 100.0 ng/mL   CVD REPORT   Result Value Ref Range    INTERPRETATION Note    AMB POC URINALYSIS DIP STICK AUTO W/O MICRO   Result Value Ref Range    Color (UA POC) Yellow     Clarity (UA POC) Clear     Glucose (UA POC) 2+ Negative    Bilirubin (UA POC) Negative Negative    Ketones (UA POC) Trace Negative    Specific gravity (UA POC) 1.025 1.001 - 1.035    Blood (UA POC) Negative Negative    pH (UA POC) 5.0 4.6 - 8.0    Protein (UA POC) Negative Negative mg/dL    Urobilinogen (UA POC) 0.2 mg/dL 0.2 - 1    Nitrites (UA POC) Negative Negative    Leukocyte esterase (UA POC) Negative Negative       Physical Exam:     Visit Vitals    /72    Pulse 84    Wt 95.3 kg (210 lb)    LMP 10/14/1987    SpO2 98%    BMI 32.89 kg/m2     General:  Alert, cooperative, no distress. Head:  Normocephalic, without obvious abnormality, atraumatic. Eyes:  Conjunctivae/corneas clear. Lungs:  Heart:   Non labored breathing  Regular rate and rhythm, no carotid bruits   Abdomen:   Soft, non-distended   Extremities: Extremities normal, atraumatic, no cyanosis or edema. Pulses: 2+ and symmetric all extremities. Skin: Skin color, texture, turgor normal. No rashes or lesions. Neurologic Exam     Gen: Attention normal             Language: naming, repetition, fluency normal             Memory: intact recent and remote memory  Cranial Nerves:  I: smell Not tested   II: visual fields Full to confrontation   II: pupils Equal, round, reactive to light   II: optic disc No papilledema   III,VII: ptosis none   III,IV,VI: extraocular muscles  Full ROM   V: mastication normal   V: facial light touch sensation  normal   VII: facial muscle function   symmetric   VIII: hearing symmetric   IX: soft palate elevation  normal   XI: trapezius strength  5/5   XI: sternocleidomastoid strength 5/5   XI: neck flexion strength  5/5   XII: tongue  midline     Motor: normal bulk and tone, no tremor              Strength: 5/5 all four extremities  Sensory: intact to LT, PP, vibration, and JPS  Reflexes: 2+ throughout; Down going toes  Coordination: Good FTN and HTS, Romberg negative  Gait: normal gait including tandem            Impression:     Merrill Bentley is a 60 y.o. female who  has a past medical history of Arthritis; Diabetes ([facility] [address] (11/8/2010); GERD (gastroesophageal reflux disease); Heart murmur (11/8/2010); and Sleep apnea (11/8/2010). who last summer 2016, had dinner, fixng her grand daughters hair, went to her recliner to sleep and found herself on the floor. No witnesses. (-) tongue bite (-) incontinence. 3 months ago, patient woke up to go to the  [facility] and last thing she remembered she was already on the floor. (-) witnesses. Last Jan 1, 2017, just came from [place] 3 AM, went to recliner sleeping, woke up to talk to her granddaughter. Patient apparently passed out and fell and broke her R wrist. (-) chest pain (-) SOB (-) lightheaded (-) alcohol (-) lightheadedness. Consideration includes syncope due to missing a meal or due to uncontrolled EDITH (not on CPAP). RECOMMENDATIONS  1. Referral to sleep specialist to address EDITH  2. Discussed with patient the pathophysiology of syncope and the need to avoid circumstances that can trigger symptoms (quickly standing from sitting position, dehydration, missing meals)  3. Check Blood sugar and BP during an event    Follow-up Disposition:  Return if symptoms worsen or fail to improve.         Thank you for the consultation      Edwar Valadez MD  Diplomate, American Board of Psychiatry and Neurology  Diplomate, Neuromuscular Medicine  Diplomate, American Board of Electrodiagnostic Medicine    Greater than 50% of time spent counseling patient      CC: Liana Santiago MD  Fax: [phone]

## 2017-01-11 NOTE — PATIENT INSTRUCTIONS
[facility]   Statement to Patients  April 1, 2014      In an effort to ensure the large volume of patient prescription refills is processed in the most efficient and expeditious manner, we are asking our patients to assist us by calling your Pharmacy for all prescription refills, this will include also your  Mail Order Pharmacy. The pharmacy will contact our office electronically to continue the refill process. Please do not wait until the last minute to call your pharmacy. We need at least 48 hours (2days) to fill prescriptions. We also encourage you to call your pharmacy before going to  your prescription to make sure it is ready. With regard to controlled substance prescription refill requests (narcotic refills) that need to be picked up at our office, we ask your cooperation by providing us with at least 72 hours (3days) notice that you will need a refill. We will not refill narcotic prescription refill requests after 4:00pm on any weekday, Monday through Thursday, or after 2:00pm on Fridays, or on the weekends. We encourage everyone to explore another way of getting your prescription refill request processed using [employer], our patient web portal through our electronic medical record system. [employer] is an efficient and effective way to communicate your medication request directly to the office and  downloadable as an tesfaye on your smart phone . [employer] also features a review functionality that allows you to view your medication list as well as leave messages for your physician. Are you ready to get connected? If so please review the attatched instructions or speak to any of our staff to get you set up right away! Thank you so much for your cooperation. Should you have any questions please contact our Practice Administrator.     The Physicians and Staff,  [facility]        Fainting: Care Instructions  Your Care Instructions    When you faint, or pass out, you lose consciousness for a short time. A brief drop in blood flow to the brain often causes it. When you fall or lie down, more blood flows to your brain and you regain consciousness. Emotional stress, pain, or overheating--especially if you have been standing--can make you faint. In these cases, fainting is usually not serious. But fainting can be a sign of a more serious problem. Your doctor may want you to have more tests to rule out other causes. The treatment you need depends on the reason why you fainted. The doctor has checked you carefully, but problems can develop later. If you notice any problems or new symptoms, get medical treatment right away. Follow-up care is a key part of your treatment and safety. Be sure to make and go to all appointments, and call your doctor if you are having problems. It's also a good idea to know your test results and keep a list of the medicines you take. How can you care for yourself at home? · Drink plenty of fluids to prevent dehydration. If you have kidney, heart, or liver disease and have to limit fluids, talk with your doctor before you increase your fluid intake. When should you call for help? Call 911 anytime you think you may need emergency care. For example, call if:  · You have symptoms of a heart problem. These may include:  ¨ Chest pain or pressure. ¨ Severe trouble breathing. ¨ A fast or irregular heartbeat. ¨ Lightheadedness or sudden weakness. ¨ Coughing up pink, foamy mucus. ¨ Passing out. After you call 911, the  may tell you to chew 1 adult-strength or 2 to 4 low-dose aspirin. Wait for an ambulance. Do not try to drive yourself. · You have symptoms of a stroke. These may include:  ¨ Sudden numbness, tingling, weakness, or loss of movement in your face, arm, or leg, especially on only one side of your body. ¨ Sudden vision changes. ¨ Sudden trouble speaking.   ¨ Sudden confusion or trouble understanding simple statements. ¨ Sudden problems with walking or balance. ¨ A sudden, severe headache that is different from past headaches. · You passed out (lost consciousness) again. Watch closely for changes in your health, and be sure to contact your doctor if:  · You do not get better as expected. Where can you learn more? Go to http://eulogio-lior.info/. Enter G158 in the search box to learn more about \"Fainting: Care Instructions. \"  Current as of: May 27, 2016  Content Version: 11.1  © 0445-2413 [employer]. Care instructions adapted under license by [employer] (which disclaims liability or warranty for this information). If you have questions about a medical condition or this instruction, always ask your healthcare professional. [address] any warranty or liability for your use of this information.

## 2017-01-11 NOTE — PROGRESS NOTES
Labs show normal blood counts, normal kidney and liver tests. A1C remains high indicating poor diabetes control but it is lower compared to 6 months ago. Normal thyroid tests and Vit D level. Good cholesterol numbers. Please share results with ENDOCRINOLOGIST.

## 2017-01-11 NOTE — MR AVS SNAPSHOT
Visit Information Date & Time Provider Department Dept. Phone Encounter #  
 1/11/2017  2:00 PM Yomaira Mares, [address] [facility] [phone] [id number] Follow-up Instructions Return if symptoms worsen or fail to improve. 1/13/2017  1:00 PM  
TRANSITIONAL CARE MANAGEMENT with Estrella Be MD  
[facility]) Appt Note: f/u from er visit [address]am [address] [place] [phone] [facility]  
  
   
 Mariam [address] Upcoming Health Maintenance Date Due Hepatitis C Screening 1960 Pneumococcal 19-64 Medium Risk (1 of 1 - PPSV23) 3/4/1977 MICROALBUMIN Q1 6/27/2017 EYE EXAM RETINAL OR DILATED Q1 6/28/2017 HEMOGLOBIN A1C Q6M 7/6/2017 FOOT EXAM Q1 1/6/2018 LIPID PANEL Q1 1/6/2018 BREAST CANCER SCRN MAMMOGRAM 1/8/2018 PAP AKA CERVICAL CYTOLOGY 6/27/2019 COLONOSCOPY 5/15/2025 DTaP/Tdap/Td series (2 - Td) 1/6/2027 Allergies as of 1/11/2017  Review Complete On: 1/11/2017 By: Yomaira Mares MD  
  
 Severity Noted Reaction Type Reactions Lisinopril  02/05/2016    Cough Penicillin G  11/08/2010    Swelling Current Immunizations  Reviewed on 1/6/2017 No immunizations on file. Not reviewed this visit You Were Diagnosed With   
  
 Codes Comments Altered mental status, unspecified    -  Primary ICD-10-CM: R41.82 
ICD-9-CM: 780.97 Syncope, unspecified syncope type     ICD-10-CM: R55 
ICD-9-CM: 780. 2 Vitals BP Pulse Weight(growth percentile) LMP SpO2 BMI  
 128/72 84 210 lb (95.3 kg) 10/14/1987 98% 32.89 kg/m2 OB Status Smoking Status Hysterectomy Never Smoker BMI and BSA Data Body Mass Index Body Surface Area  
 32.89 kg/m 2 2.12 m 2 Preferred Pharmacy Pharmacy Name Phone [facility] [address] [place] [address] [phone] Your Updated Medication List  
  
   
This list is accurate as of: 1/11/17  3:05 PM.  Always use your most recent med list.  
  
  
  
  
 aspirin delayed-release 81 mg tablet Take 81 mg by mouth daily. BD AUTOSHIELD DUO PEN NEEDLE 30 gauge x 3/16\" Ndle Generic drug:  pen needle,diabetic dual safty Blood-Glucose Meter monitoring kit ONE TOUCH ULTRA. Test once a day. .02  
  
 cyclobenzaprine 10 mg tablet Commonly known as:  FLEXERIL Take 1 Tab by mouth nightly. glipiZIDE SR 10 mg CR tablet Commonly known as:  GLUCOTROL XL Take 2 Tabs by mouth daily. glucose blood VI test strips strip Commonly known as:  blood glucose test  
Accu Check Siomara Smart View - test strips DX E11.9 Test blood sugar twice daily. GLYXAMBI 25-5 mg Tab Generic drug:  empagliflozin-linagliptin TAKE ONE TABLET BY MOUTH ONCE DAILY  
  
 ketorolac 10 mg tablet Commonly known as:  TORADOL Take 10 mg by mouth every six (6) hours as needed. Lancets Misc Accu Check Siomara Smart View Fast Click Drum. DX E11.9 Test blood sugar twice daily  
  
 levocetirizine 5 mg tablet Commonly known as:  Graylon Tyron Take 1 Tab by mouth daily. Indications: SEASONAL ALLERGIC RHINITIS  
  
 losartan 25 mg tablet Commonly known as:  COZAAR Take  by mouth daily. omeprazole 20 mg capsule Commonly known as:  PRILOSEC  
TAKE ONE CAPSULE BY MOUTH ONCE DAILY pneumococcal 23-arelis ps vaccine 25 mcg/0.5 mL injection Commonly known as:  PNEUMOVAX 23 One dose  
  
 pravastatin 40 mg tablet Commonly known as:  PRAVACHOL Take 1 Tab by mouth nightly. Indications: HYPERCHOLESTEROLEMIA TOUJEO SOLOSTAR 300 unit/mL (1.5 mL) Inpn Generic drug:  insulin glargine 100 Units by SubCUTAneous route daily. triamcinolone 55 mcg nasal inhaler Commonly known as:  NASACORT  
2 Sprays by Both Nostrils route daily. VITAMIN D2 50,000 unit capsule Generic drug:  ergocalciferol TAKE ONE CAPSULE BY MOUTH TWICE A WEEK We Performed the Following REFERRAL TO SLEEP STUDIES [REF99 Custom] Comments:  
 History of EDITH but not compliant to CPAP; (?) sleep walking Follow-up Instructions Return if symptoms worsen or fail to improve. Referral Information Referral ID Referred By Referred To  
  
 [id number] [facility], [place], [place] [address] Phone: [phone] Visits Status Start Date End Date 1 New Request 1/11/17 1/11/18 If your referral has a status of pending review or denied, additional information will be sent to support the outcome of this decision. Patient Instructions PRESCRIPTION REFILL POLICY [facility] Statement to Patients April 1, 2014 In an effort to ensure the large volume of patient prescription refills is processed in the most efficient and expeditious manner, we are asking our patients to assist us by calling your Pharmacy for all prescription refills, this will include also your  Mail Order Pharmacy. The pharmacy will contact our office electronically to continue the refill process. Please do not wait until the last minute to call your pharmacy. We need at least 48 hours (2days) to fill prescriptions. We also encourage you to call your pharmacy before going to  your prescription to make sure it is ready. With regard to controlled substance prescription refill requests (narcotic refills) that need to be picked up at our office, we ask your cooperation by providing us with at least 72 hours (3days) notice that you will need a refill. We will not refill narcotic prescription refill requests after 4:00pm on any weekday, Monday through Thursday, or after 2:00pm on Fridays, or on the weekends.   
  
We encourage everyone to explore another way of getting your prescription refill request processed using Firefly Media, our patient web portal through our electronic medical record system. Fire[employer] is an efficient and effective way to communicate your medication request directly to the office and  downloadable as an tesfaye on your smart phone . Fire[employer] also features a review functionality that allows you to view your medication list as well as leave messages for your physician. Are you ready to get connected? If so please review the attatched instructions or speak to any of our staff to get you set up right away! Thank you so much for your cooperation. Should you have any questions please contact our Practice Administrator. The Physicians and Staff,  [facility] Fainting: Care Instructions Your Care Instructions When you faint, or pass out, you lose consciousness for a short time. A brief drop in blood flow to the brain often causes it. When you fall or lie down, more blood flows to your brain and you regain consciousness. Emotional stress, pain, or overheatingespecially if you have been standingcan make you faint. In these cases, fainting is usually not serious. But fainting can be a sign of a more serious problem. Your doctor may want you to have more tests to rule out other causes. The treatment you need depends on the reason why you fainted. The doctor has checked you carefully, but problems can develop later. If you notice any problems or new symptoms, get medical treatment right away. Follow-up care is a key part of your treatment and safety. Be sure to make and go to all appointments, and call your doctor if you are having problems. It's also a good idea to know your test results and keep a list of the medicines you take. How can you care for yourself at home? · Drink plenty of fluids to prevent dehydration. If you have kidney, heart, or liver disease and have to limit fluids, talk with your doctor before you increase your fluid intake. When should you call for help? Call 911 anytime you think you may need emergency care. For example, call if: 
· You have symptoms of a heart problem. These may include: ¨ Chest pain or pressure. ¨ Severe trouble breathing. ¨ A fast or irregular heartbeat. ¨ Lightheadedness or sudden weakness. ¨ Coughing up pink, foamy mucus. ¨ Passing out. After you call 911, the  may tell you to chew 1 adult-strength or 2 to 4 low-dose aspirin. Wait for an ambulance. Do not try to drive yourself. · You have symptoms of a stroke. These may include: 
¨ Sudden numbness, tingling, weakness, or loss of movement in your face, arm, or leg, especially on only one side of your body. ¨ Sudden vision changes. ¨ Sudden trouble speaking. ¨ Sudden confusion or trouble understanding simple statements. ¨ Sudden problems with walking or balance. ¨ A sudden, severe headache that is different from past headaches. · You passed out (lost consciousness) again. Watch closely for changes in your health, and be sure to contact your doctor if: 
· You do not get better as expected. Where can you learn more? Go to http://eulogio-lior.info/. Enter G961 in the search box to learn more about \"Fainting: Care Instructions. \" Current as of: May 27, 2016 Content Version: 11.1 © 2343-5048 [employer] [place]. Care instructions adapted under license by [employer] (which disclaims liability or warranty for this information). If you have questions about a medical condition or this instruction, always ask your healthcare professional. [place] [address] any warranty or liability for your use of this information. Introducing [facility]! Dear Tom Claros: Thank you for requesting a NsGene account. Our records indicate that you already have an active NsGene account. You can access your account anytime at [email or web address]. Bolt HR/[employer] Technologies Did you know that you can access your hospital and ER discharge instructions at any time in GIDEEN? You can also review all of your test results from your hospital stay or ER visit. Additional Information If you have questions, please visit the Frequently Asked Questions section of the GIDEEN website at [email or web address]/[email or web address] Remember, GIDEEN is NOT to be used for urgent needs. For medical emergencies, dial 911. Now available from your iPhone and Android! Please provide this summary of care documentation to your next provider. Your primary care clinician is listed as Umang Kraft. If you have any questions after today's visit, please call [phone].

## 2017-01-11 NOTE — TELEPHONE ENCOUNTER
Please fax lab results to [facility]. Also mail results letter. Let pt know her EEG was normal.  No seizure activity. Cardiology has cleared her. Awaiting Neurology evaluation. I think she will OK to have surgery.

## 2017-01-11 NOTE — TELEPHONE ENCOUNTER
Patient is calling and would like a phone call about her most recent labs.   Please call at [phone]

## 2017-01-11 NOTE — LETTER
1/11/2017 3:06 PM 
 
Patient:  Kamryn Hernandez YOB: 1960 Date of Visit: 1/11/2017 Dear Eneida Soto MD 
[address] [place] [address] [phone] VIA In Basket 
 : Thank you for referring Ms. Tsering Awad to me for evaluation/treatment. Below are the relevant portions of my assessment and plan of care. If you have questions, please do not hesitate to call me. I look forward to following Ms. Morrison along with you. Sincerely, Edwardo Diaz MD

## 2017-01-12 ENCOUNTER — TELEPHONE (OUTPATIENT)
Dept: FAMILY MEDICINE CLINIC | Age: 57
End: 2017-01-12

## 2017-01-29 RX ORDER — EMPAGLIFLOZIN AND LINAGLIPTIN 25; 5 MG/1; MG/1
TABLET, FILM COATED ORAL
Qty: 30 TAB | Refills: 0 | OUTPATIENT
Start: 2017-01-29

## 2017-01-29 NOTE — TELEPHONE ENCOUNTER
Please let pharmacy know to send refill request for [facility] to ENDO Dr. Jonny Maher. We did not originally order this med.

## 2017-02-03 RX ORDER — EMPAGLIFLOZIN AND LINAGLIPTIN 25; 5 MG/1; MG/1
TABLET, FILM COATED ORAL
Qty: 30 TAB | Refills: 0 | OUTPATIENT
Start: 2017-02-03

## 2017-02-06 ENCOUNTER — OFFICE VISIT (OUTPATIENT)
Dept: SLEEP MEDICINE | Age: 57
End: 2017-02-06

## 2017-02-06 VITALS
BODY MASS INDEX: 33.27 KG/M2 | WEIGHT: 212 LBS | SYSTOLIC BLOOD PRESSURE: 141 MMHG | HEART RATE: 85 BPM | HEIGHT: 67 IN | OXYGEN SATURATION: 96 % | DIASTOLIC BLOOD PRESSURE: 79 MMHG

## 2017-02-06 DIAGNOSIS — Z98.890 S/P UPPP (UVULOPALATOPHARYNGOPLASTY): ICD-10-CM

## 2017-02-06 DIAGNOSIS — G47.50 PARASOMNIA: ICD-10-CM

## 2017-02-06 DIAGNOSIS — G47.33 OSA (OBSTRUCTIVE SLEEP APNEA): Primary | ICD-10-CM

## 2017-02-06 RX ORDER — SULINDAC 200 MG/1
TABLET ORAL
COMMUNITY
Start: 2017-01-18 | End: 2017-10-18 | Stop reason: ALTCHOICE

## 2017-02-06 RX ORDER — OXYCODONE AND ACETAMINOPHEN 5; 325 MG/1; MG/1
TABLET ORAL
COMMUNITY
Start: 2017-01-12 | End: 2017-10-18 | Stop reason: ALTCHOICE

## 2017-02-06 NOTE — PATIENT INSTRUCTIONS
[address] [facility] [address]ud[address], [address]  Tel.  [phone]  Fax. 100 [facility] 60  [place], [address] [facility]  Tel.  [phone]  Fax. [phone] [address]  Tel.  [phone]  Fax. [phone]     Sleep Apnea: After Your Visit  Your Care Instructions  Sleep apnea occurs when you frequently stop breathing for 10 seconds or longer during sleep. It can be mild to severe, based on the number of times per hour that you stop breathing or have slowed breathing. Blocked or narrowed airways in your nose, mouth, or throat can cause sleep apnea. Your airway can become blocked when your throat muscles and tongue relax during sleep. Sleep apnea is common, occurring in 1 out of 20 individuals. Individuals having any of the following characteristics should be evaluated and treated right away due to high risk and detrimental consequences from untreated sleep apnea:  1. Obesity  2. Congestive Heart failure  3. Atrial Fibrillation  4. Uncontrolled Hypertension  5. Type II Diabetes  6. Night-time Arrhythmias  7. Stroke  8. Pulmonary Hypertension  9. High-risk Driving Populations (pilots, truck drivers, etc.)  10. Patients Considering Weight-loss Surgery    How do you know you have sleep apnea? You probably have sleep apnea if you answer 'yes' to 3 or more of the following questions:  S - Have you been told that you Snore? T - Are you often Tired during the day? O - Has anyone Observed you stop breathing while sleeping? P- Do you have (or are being treated for) high blood Pressure? B - Are you obese (Body Mass Index > 35)? A - Is your Age 48years old or older? N - Is your Neck size greater than 16 inches? G - Are you male Gender? A sleep physician can prescribe a breathing device that prevents tissues in the throat from blocking your airway.  Or your doctor may recommend using a dental device (oral breathing device) to help keep your airway open. In some cases, surgery may be needed to remove enlarged tissues in the throat. Follow-up care is a key part of your treatment and safety. Be sure to make and go to all appointments, and call your doctor if you are having problems. It's also a good idea to know your test results and keep a list of the medicines you take. How can you care for yourself at home? · Lose weight, if needed. It may reduce the number of times you stop breathing or have slowed breathing. · Go to bed at the same time every night. · Sleep on your side. It may stop mild apnea. If you tend to roll onto your back, sew a pocket in the back of your pajama top. Put a tennis ball into the pocket, and stitch the pocket shut. This will help keep you from sleeping on your back. · Avoid alcohol and medicines such as sleeping pills and sedatives before bed. · Do not smoke. Smoking can make sleep apnea worse. If you need help quitting, talk to your doctor about stop-smoking programs and medicines. These can increase your chances of quitting for good. · Prop up the head of your bed 4 to 6 inches by putting bricks under the legs of the bed. · Treat breathing problems, such as a stuffy nose, caused by a cold or allergies. · Use a continuous positive airway pressure (CPAP) breathing machine if lifestyle changes do not help your apnea and your doctor recommends it. The machine keeps your airway from closing when you sleep. · If CPAP does not help you, ask your doctor whether you should try other breathing machines. A bilevel positive airway pressure machine has two types of air pressureâone for breathing in and one for breathing out. Another device raises or lowers air pressure as needed while you breathe. · If your nose feels dry or bleeds when using one of these machines, talk with your doctor about increasing moisture in the air. A humidifier may help.   · If your nose is runny or stuffy from using a breathing machine, talk with your doctor about using decongestants or a corticosteroid nasal spray. When should you call for help? Watch closely for changes in your health, and be sure to contact your doctor if:  · You still have sleep apnea even though you have made lifestyle changes. · You are thinking of trying a device such as CPAP. · You are having problems using a CPAP or similar machine. Where can you learn more? Go to [employer]. Enter G017 in the search box to learn more about \"Sleep Apnea: After Your Visit. \"   © 1449-7023 Healthwise, Incorporated. Care instructions adapted under license by Tatum Johnson (which disclaims liability or warranty for this information). This care instruction is for use with your licensed healthcare professional. If you have questions about a medical condition or this instruction, always ask your healthcare professional. Nika Cowing any warranty or liability for your use of this information. PROPER SLEEP HYGIENE    What to avoid  · Do not have drinks with caffeine, such as coffee or black tea, for 8 hours before bed. · Do not smoke or use other types of tobacco near bedtime. Nicotine is a stimulant and can keep you awake. · Avoid drinking alcohol late in the evening, because it can cause you to wake in the middle of the night. · Do not eat a big meal close to bedtime. If you are hungry, eat a light snack. · Do not drink a lot of water close to bedtime, because the need to urinate may wake you up during the night. · Do not read or watch TV in bed. Use the bed only for sleeping and sexual activity. What to try  · Go to bed at the same time every night, and wake up at the same time every morning. Do not take naps during the day. · Keep your bedroom quiet, dark, and cool. · Get regular exercise, but not within 3 to 4 hours of your bedtime. .  · Sleep on a comfortable pillow and mattress.   · If watching the clock makes you anxious, turn it facing away from you so you cannot see the time. · If you worry when you lie down, start a worry book. Well before bedtime, write down your worries, and then set the book and your concerns aside. · Try meditation or other relaxation techniques before you go to bed. · If you cannot fall asleep, get up and go to another room until you feel sleepy. Do something relaxing. Repeat your bedtime routine before you go to bed again. · Make your house quiet and calm about an hour before bedtime. Turn down the lights, turn off the TV, log off the computer, and turn down the volume on music. This can help you relax after a busy day. Drowsy Driving  The [address] [place] [address] Road Traffic Safety Administration cites drowsiness as a causing factor in more than 754,208 police reported crashes annually, resulting in 76,000 injuries and 1,500 deaths. Other surveys suggest 55% of people polled have driven while drowsy in the past year, 23% had fallen asleep but not crashed, 3% crashed, and 2% had and accident due to drowsy driving. Who is at risk? Young Drivers: One study of drowsy driving accidents states that 55% of the drivers were under 25 years. Of those, 75% were male. Shift Workers and Travelers: People who work overnight or travel across time zones frequently are at higher risk of experiencing Circadian Rhythm Disorders. They are trying to work and function when their body is programed to sleep. Sleep Deprived: Lack of sleep has a serious impact on your ability to pay attention or focus on a task. Consistently getting less than the average of 8 hours your body needs creates partial or cumulative sleep deprivation. Untreated Sleep Disorders: Sleep Apnea, Narcolepsy, R.L.S., and other sleep disorders (untreated) prevent a person from getting enough restful sleep. This leads to excessive daytime sleepiness and increases the risk for drowsy driving accidents by up to 7 times.   Medications / Alcohol: Even over the counter medications can cause drowsiness. Medications that impair a drivers attention should have a warning label. Alcohol naturally makes you sleepy and on its own can cause accidents. Combined with excessive drowsiness its effects are amplified. Signs of Drowsy Driving:   * You don't remember driving the last few miles   * You may drift out of your dayan   * You are unable to focus and your thoughts wander   * You may yawn more often than normal   * You have difficulty keeping your eyes open / nodding off   * Missing traffic signs, speeding, or tailgating  Prevention-   Good sleep hygiene, lifestyle and behavioral choices have the most impact on drowsy driving. There is no substitute for sleep and the average person requires 8 hours nightly. If you find yourself driving drowsy, stop and sleep. Consider the sleep hygiene tips provided during your visit as well. Medication Refill Policy: Refills for all medications require 1 week advance notice. Please have your pharmacy fax a refill request. We are unable to fax, or call in \"controled substance\" medications and you will need to pick these prescriptions up from our office. Winking [employer] Activation    Thank you for requesting access to [employer]. Please follow the instructions below to securely access and download your online medical record. [employer] allows you to send messages to your doctor, view your test results, renew your prescriptions, schedule appointments, and more. How Do I Sign Up? 1. In your internet browser, go to [email or web address]. 2. Click on the First Time User? Click Here link in the Sign In box. You will see the New Member Sign Up page. 3. Enter your Winking Entertainment Access Code exactly as it appears below. You will not need to use this code after youve completed the sign-up process. If you do not sign up before the expiration date, you must request a new code. [employer] Access Code:  Activation code not generated  Current [employer] Status: Active (This is the date your Porch access code will )    4. Enter the last four digits of your Social Security Number (xxxx) and Date of Birth (mm/dd/yyyy) as indicated and click Submit. You will be taken to the next sign-up page. 5. Create a LDK Solart ID. This will be your Porch login ID and cannot be changed, so think of one that is secure and easy to remember. 6. Create a Porch password. You can change your password at any time. 7. Enter your Password Reset Question and Answer. This can be used at a later time if you forget your password. 8. Enter your e-mail address. You will receive e-mail notification when new information is available in [address] 9. Click Sign Up. You can now view and download portions of your medical record. 10. Click the Download Summary menu link to download a portable copy of your medical information. Additional Information    If you have questions, please call [phone]. Remember, Porch is NOT to be used for urgent needs. For medical emergencies, dial 911.

## 2017-02-06 NOTE — PROGRESS NOTES
[address] [facility] [place]. [place], [address]  Tel.  [phone]  Fax. 100 [facility] [address]  [place], [address] [facility]  Tel.  [phone]  Fax. [phone] [address] [facility] [address]   Tel.  [phone]  Fax. [phone]         Subjective: Jl Valles is an 60 y.o. female referred for evaluation for a sleep disorder. She complains of snoring associated with periods of not breathing, excessive daytime sleepiness. Symptoms began several years ago, she was diagnosed with EDITH and treated with UPPP symptoms improved but only temporarily. She was reassessed and prescribed CPAP therapy which was discontinued due to mask discomfort. She usually can fall asleep in 30 minutes. Family or house members note snoring, periods of not breathing and walk while still asleep. She has had 3 falls during the night without recall of how she fell. She reports of feeling completely or partially paralyzed while waking up from a dream.  Jl Valles does wake up frequently at night. She is (pt can go back to sleep) bothered by waking up too early and left unable to get back to sleep. She actually sleeps about 6 hours at night and wakes up about 5 (1-6) times during the night. She does not work shifts: Jhoan Bhat indicates she does get too little sleep at night. Her bedtime is 2300. She awakens at 0600. She does take naps. She takes 6 naps a week lasting 15 to 30 minutes. She has the following observed behaviors: Loud snoring, Sleep talking;  . Other remarks:  She denies of symptoms of RLS or RBD. Badger Sleepiness Score: 16 which reflect severe daytime drowsiness.     Allergies   Allergen Reactions    Lisinopril Cough    Penicillin G Swelling         Current Outpatient Prescriptions:     oxyCODONE-acetaminophen (PERCOCET) 5-325 mg per tablet, , Disp: , Rfl:     sulindac (CLINORIL) 200 mg tablet, , Disp: , Rfl:     GLYXAMBI 25-5 mg tab, TAKE ONE TABLET BY MOUTH ONCE DAILY, Disp: 30 Tab, Rfl: 0    omeprazole (PRILOSEC) 20 mg capsule, TAKE ONE CAPSULE BY MOUTH ONCE DAILY, Disp: 30 Cap, Rfl: 5    VITAMIN D2 50,000 unit capsule, TAKE ONE CAPSULE BY MOUTH TWICE A WEEK, Disp: 9 Cap, Rfl: 3    pravastatin (PRAVACHOL) 40 mg tablet, Take 1 Tab by mouth nightly. Indications: HYPERCHOLESTEROLEMIA, Disp: 90 Tab, Rfl: 1    levocetirizine (XYZAL) 5 mg tablet, Take 1 Tab by mouth daily. Indications: SEASONAL ALLERGIC RHINITIS, Disp: 30 Tab, Rfl: 12    glipiZIDE SR (GLUCOTROL) 10 mg CR tablet, Take 2 Tabs by mouth daily. , Disp: 60 Tab, Rfl: 5    losartan (COZAAR) 25 mg tablet, Take  by mouth daily. , Disp: , Rfl:     BD AUTOSHIELD DUO PEN NEEDLE 30 gauge x 3/16\" ndle, , Disp: , Rfl:     glucose blood VI test strips (BLOOD GLUCOSE TEST) strip, Accu Check Siomara Smart View - test strips DX E11.9 Test blood sugar twice daily. , Disp: 200 Strip, Rfl: 3    Lancets misc, Accu Check Siomara Smart View Fast Click Drum. DX E11.9 Test blood sugar twice daily, Disp: 200 Each, Rfl: 3    insulin glargine (TOUJEO SOLOSTAR) 300 unit/mL (1.5 mL) inpn, 100 Units by SubCUTAneous route daily. , Disp: , Rfl:     Blood-Glucose Meter monitoring kit, ONE TOUCH ULTRA. Test once a day. .02, Disp: 1 kit, Rfl: 0    aspirin delayed-release 81 mg tablet, Take 81 mg by mouth daily. , Disp: , Rfl:     ketorolac (TORADOL) 10 mg tablet, Take 10 mg by mouth every six (6) hours as needed. , Disp: , Rfl:     pneumococcal 23-arelis ps vaccine (PNEUMOVAX 23) 25 mcg/0.5 mL syrg, One dose, Disp: 1 Syringe, Rfl: 0    cyclobenzaprine (FLEXERIL) 10 mg tablet, Take 1 Tab by mouth nightly. (Patient taking differently: Take 10 mg by mouth nightly. prn), Disp: 30 Tab, Rfl: 3    triamcinolone (NASACORT) 55 mcg nasal inhaler, 2 Sprays by Both Nostrils route daily. (Patient taking differently: 2 Sprays by Both Nostrils route daily.  prn), Disp: 16.5 g, Rfl: 5     She  has a past medical history of Arthritis; Diabetes ([facility] (11/8/2010); GERD (gastroesophageal reflux disease); Heart murmur (11/8/2010); Ill-defined condition; and Sleep apnea (11/8/2010). She  has a past surgical history that includes heent; hysterectomy; and orthopaedic. She family history includes Diabetes in her father, maternal grandmother, and paternal grandmother; Heart Disease in her father; Hypertension in her mother. She  reports that she has never smoked. She has never used smokeless tobacco. She reports that she drinks alcohol. She reports that she does not use illicit drugs. Review of Systems:  Constitutional:  No significant weight loss or weight gain  Eyes:  No blurred vision  CVS:  No significant chest pain  Pulm:  No significant shortness of breath  GI:  No significant nausea or vomiting  :  No significant nocturia  Musculoskeletal:  No significant joint pain at night  Skin:  No significant rashes  Neuro:  No significant dizziness   Psych:  No active mood issues    Sleep Review of Systems: notable for no difficulty falling asleep; frequent awakenings at night;  regular dreaming noted; no nightmares ; no early morning headaches; no memory problems; no concentration issues; no history of any automobile or occupational accidents due to daytime drowsiness. Objective:     Visit Vitals    /79    Pulse 85    Ht 5' 7\" (1.702 m)    Wt 212 lb (96.2 kg)    LMP 10/14/1987    SpO2 96%    BMI 33.2 kg/m2         General:   Not in acute distress   Eyes:  Anicteric sclerae, no obvious strabismus   Nose:  No obvious nasal septum deviation    Oropharynx:   Class 4 oropharyngeal outlet, thick tongue base, s/p UPPP, low-lying soft palate, narrow tonsilo-pharyngeal pilars   Tonsils:   tonsils are not seen due to low-lying soft palate   Neck:   Neck circ.  in \"inches\": 15.75; midline trachea   Chest/Lungs:  Equal lung expansion, clear on auscultation    CVS:  Normal rate, regular rhythm; no JVD   Skin:  Warm to touch; no obvious rashes Neuro: No focal deficits ; no obvious tremor    Psych:  Normal affect,  normal countenance;          Assessment:       ICD-10-CM ICD-9-CM    1. EDITH (obstructive sleep apnea) G47.33 327.23 POLYSOMNOGRAPHY 1 NIGHT   2. S/P UPPP (uvulopalatopharyngoplasty) Z98.890 V45.89    3. BMI 33.0-33.9,adult Z68.33 V85.33    4. Parasomnia G47.50 307.47 POLYSOMNOGRAPHY 1 NIGHT         Plan:     * The patient currently has a Moderate Risk for having sleep apnea. STOP-BANG score 4.  * Sleep testing was ordered for initial evaluation. * She was provided information on sleep apnea including coresponding risk factors and the importance of proper treatment. * Treatment options if indicated were reviewed today. Patient agrees to a trial of PAP therapy if indicated. * Counseling was provided regarding proper sleep hygiene (including effect of light on sleep), sleep environment safety and safe driving. * Effect of sleep disturbance on weight was reviewed. We have recommended a dedicated weight loss through appropriate diet and an exercise regiment as significant weight reduction has been shown to reduce severity of obstructive sleep apnea. * Telephone [phone]  follow-up shortly after sleep study to review results and plan final management.     (patient has given permission for a message to be left regarding test results and further management if patient cannot be cannot be reached directly). Thank you for allowing us to participate in your patient's medical care. We'll keep you updated on these investigations. Hernando Mercedes MD, FAASM  Diplomate American Board of Sleep Medicine  Diplomate in Sleep Medicine - ABP  Electronically signed.

## 2017-02-15 DIAGNOSIS — M43.6 NECK STIFFNESS: ICD-10-CM

## 2017-02-15 RX ORDER — CYCLOBENZAPRINE HCL 10 MG
TABLET ORAL
Qty: 30 TAB | Refills: 0 | Status: SHIPPED | OUTPATIENT
Start: 2017-02-15 | End: 2017-10-09 | Stop reason: SDUPTHER

## 2017-02-16 ENCOUNTER — HOSPITAL ENCOUNTER (OUTPATIENT)
Dept: SLEEP MEDICINE | Age: 57
Discharge: HOME | End: 2017-02-16
Payer: COMMERCIAL

## 2017-02-16 DIAGNOSIS — G47.50 PARASOMNIA: ICD-10-CM

## 2017-02-16 DIAGNOSIS — G47.33 OSA (OBSTRUCTIVE SLEEP APNEA): ICD-10-CM

## 2017-02-16 PROCEDURE — 95810 POLYSOM 6/> YRS 4/> PARAM: CPT

## 2017-02-20 ENCOUNTER — DOCUMENTATION ONLY (OUTPATIENT)
Dept: SLEEP MEDICINE | Age: 57
End: 2017-02-20

## 2017-02-20 ENCOUNTER — TELEPHONE (OUTPATIENT)
Dept: SLEEP MEDICINE | Age: 57
End: 2017-02-20

## 2017-02-20 DIAGNOSIS — G47.33 OSA (OBSTRUCTIVE SLEEP APNEA): Primary | ICD-10-CM

## 2017-02-21 ENCOUNTER — DOCUMENTATION ONLY (OUTPATIENT)
Dept: SLEEP MEDICINE | Age: 57
End: 2017-02-21

## 2017-02-21 NOTE — PROGRESS NOTES
Faxed [facility] to [facility] and patient notified of DME and contact info. PAP adherence appointment was discussed with patient. Patient has declined to schedule with provider. Patient will call back to scheduled once setup on PAP.

## 2017-03-06 RX ORDER — GLIPIZIDE 10 MG/1
TABLET, FILM COATED, EXTENDED RELEASE ORAL
Qty: 60 TAB | Refills: 5 | Status: SHIPPED | OUTPATIENT
Start: 2017-03-06 | End: 2017-10-18 | Stop reason: SDUPTHER

## 2017-04-11 ENCOUNTER — HOSPITAL ENCOUNTER (OUTPATIENT)
Dept: MAMMOGRAPHY | Age: 57
Discharge: HOME | End: 2017-04-11
Attending: FAMILY MEDICINE
Payer: COMMERCIAL

## 2017-04-11 DIAGNOSIS — Z12.31 VISIT FOR SCREENING MAMMOGRAM: ICD-10-CM

## 2017-04-11 PROCEDURE — 77067 SCR MAMMO BI INCL CAD: CPT

## 2017-04-22 RX ORDER — ERGOCALCIFEROL 1.25 MG/1
CAPSULE ORAL
Qty: 9 CAP | Refills: 0 | Status: SHIPPED | OUTPATIENT
Start: 2017-04-22 | End: 2017-06-29 | Stop reason: SDUPTHER

## 2017-05-08 ENCOUNTER — OFFICE VISIT (OUTPATIENT)
Dept: SLEEP MEDICINE | Age: 57
End: 2017-05-08

## 2017-05-08 VITALS
HEIGHT: 67 IN | BODY MASS INDEX: 32.65 KG/M2 | SYSTOLIC BLOOD PRESSURE: 119 MMHG | WEIGHT: 208 LBS | HEART RATE: 74 BPM | OXYGEN SATURATION: 96 % | DIASTOLIC BLOOD PRESSURE: 74 MMHG

## 2017-05-08 DIAGNOSIS — G47.33 OSA (OBSTRUCTIVE SLEEP APNEA): Primary | ICD-10-CM

## 2017-05-08 NOTE — PROGRESS NOTES
217 [facility]. [place], [address]  Tel.  [phone]  Fax. 100 [facility] 60  [place], [address] [facility]  Tel.  [phone]  Fax. [phone] [address] [place]   Tel.  [phone]  Fax. [phone]     Alize Floyd is a 59 y.o. female seen for a positive airway pressure follow-up. She reports problems using the device. She is 53.3% compliant over the past 30 days. The following problems are identified:    Drowsiness yes Problems exhaling no   Snoring no Forget to put on no   Mask Comfortable no Can't fall asleep no   Dry Mouth no Mask falls off no   Air Leaking no Frequent awakenings yes       She admits that her sleep has not improved. She reports of difficulties with breathing and synchronizing breathing with the CPAP airflow. Allergies   Allergen Reactions    Lisinopril Cough    Penicillin G Swelling       She has a current medication list which includes the following prescription(s): vitamin d2, glipizide sr, cyclobenzaprine, glyxambi, omeprazole, pravastatin, losartan, bd autoshield duo pen needle, glucose blood vi test strips, lancets, insulin glargine, blood-glucose meter, aspirin delayed-release, oxycodone-acetaminophen, sulindac, ketorolac, levocetirizine, pneumococcal 23-arelis ps vaccine, and triamcinolone. .      She  has a past medical history of Arthritis; Diabetes ([address] (11/8/2010); GERD (gastroesophageal reflux disease); Heart murmur (11/8/2010); Ill-defined condition; and Sleep apnea (11/8/2010).     [place] Sleepiness Score: 17   and Modified F.O.S.Q. Score Total / 2: 17.5      O>    Visit Vitals    /74    Pulse 74    Ht 5' 7\" (1.702 m)    Wt 208 lb (94.3 kg)    LMP 10/14/1987    SpO2 96%    BMI 32.58 kg/m2         General:   Not in acute distress   Eyes:  Anicteric sclerae, no obvious strabismus   Nose:  No obvious nasal septum deviation    Oropharynx:   Class 4 oropharyngeal outlet, thick tongue base, uvula not seen due to low-lying soft palate, narrow tonsilo-pharyngeal pilars   Tonsils:   tonsils are not visualized due to low-lying soft palate   Neck:   midline trachea   Chest/Lungs:  Equal lung expansion, clear on auscultation    CVS:  Normal rate, regular rhythm; no JVD   Skin:  Warm to touch; no obvious rashes   Neuro:  No focal deficits ; no obvious tremor    Psych:  Normal affect,  normal countenance;           A>    ICD-10-CM ICD-9-CM    1. EDITH (obstructive sleep apnea) G47.33 327.23 SLEEP LAB (PAP TITRATION)   2. BMI 32.0-32.9,adult Z68.32 V85.32      AHI = 42.5. On CPAP :  4-20 cmH2O. Compliant:      yes    Therapeutic Response:  Negative    P>    * We have recommended a dedicated weight loss through appropriate diet and an exercise regiment as significant weight reduction has been shown to reduce severity of obstructive sleep apnea. * Follow-up Disposition:  Return in about 1 year (around 5/8/2018), or if symptoms worsen or fail to improve. * She was asked to contact our office for any problems regarding PAP therapy. * Counseling was provided regarding the importance of regular PAP use and on proper sleep hygiene and safe driving. * Re-enforced proper and regular cleaning for the device. Thank you for allowing us to participate in your patient's medical care. Candance Money, MD, FAASM  Electronically signed.  May 8, 2017

## 2017-05-08 NOTE — PATIENT INSTRUCTIONS
217 [facility] [place]. [place], [address]  Tel.  [phone]  Fax. 100 [facility] 60  [place], [address] [facility]  Tel.  [phone]  Fax. [phone] [address]  Tel.  [phone]  Fax. [phone]     Learning About CPAP for Sleep Apnea  What is CPAP? CPAP is a small machine that you use at home every night while you sleep. It increases air pressure in your throat to keep your airway open. When you have sleep apnea, this can help you sleep better so you feel much better. CPAP stands for \"continuous positive airway pressure. \"  The CPAP machine will have one of the following:  · A mask that covers your nose and mouth  · Prongs that fit into your nose  · A mask that covers your nose only, the most common type. This type is called NCPAP. The N stands for \"nasal.\"  Why is it done? CPAP is usually the best treatment for obstructive sleep apnea. It is the first treatment choice and the most widely used. Your doctor may suggest CPAP if you have:  · Moderate to severe sleep apnea. · Sleep apnea and coronary artery disease (CAD) or heart failure. How does it help? · CPAP can help you have more normal sleep, so you feel less sleepy and more alert during the daytime. · CPAP may help keep heart failure or other heart problems from getting worse. · NCPAP may help lower your blood pressure. · If you use CPAP, your bed partner may also sleep better because you are not snoring or restless. What are the side effects? Some people who use CPAP have:  · A dry or stuffy nose and a sore throat. · Irritated skin on the face. · Sore eyes. · Bloating. If you have any of these problems, work with your doctor to fix them. Here are some things you can try:  · Be sure the mask or nasal prongs fit well. · See if your doctor can adjust the pressure of your CPAP. · If your nose is dry, try a humidifier.   · If your nose is runny or stuffy, try decongestant medicine or a steroid nasal spray. If these things do not help, you might try a different type of machine. Some machines have air pressure that adjusts on its own. Others have air pressures that are different when you breathe in than when you breathe out. This may reduce discomfort caused by too much pressure in your nose. Where can you learn more? Go to [employer]  Enter Edna Kwong in the search box to learn more about \"Learning About CPAP for Sleep Apnea. \"   © 2506-1518 Healthwise, Incorporated. Care instructions adapted under license by [employer] (which disclaims liability or warranty for this information). This care instruction is for use with your licensed healthcare professional. If you have questions about a medical condition or this instruction, always ask your healthcare professional. [address] any warranty or liability for your use of this information. Content Version: 6.7.44185; Last Revised: January 11, 2010  PROPER SLEEP HYGIENE    What to avoid  · Do not have drinks with caffeine, such as coffee or black tea, for 8 hours before bed. · Do not smoke or use other types of tobacco near bedtime. Nicotine is a stimulant and can keep you awake. · Avoid drinking alcohol late in the evening, because it can cause you to wake in the middle of the night. · Do not eat a big meal close to bedtime. If you are hungry, eat a light snack. · Do not drink a lot of water close to bedtime, because the need to urinate may wake you up during the night. · Do not read or watch TV in bed. Use the bed only for sleeping and sexual activity. What to try  · Go to bed at the same time every night, and wake up at the same time every morning. Do not take naps during the day. · Keep your bedroom quiet, dark, and cool. · Get regular exercise, but not within 3 to 4 hours of your bedtime. .  · Sleep on a comfortable pillow and mattress.   · If watching the clock makes you anxious, turn it facing away from you so you cannot see the time. · If you worry when you lie down, start a worry book. Well before bedtime, write down your worries, and then set the book and your concerns aside. · Try meditation or other relaxation techniques before you go to bed. · If you cannot fall asleep, get up and go to another room until you feel sleepy. Do something relaxing. Repeat your bedtime routine before you go to bed again. · Make your house quiet and calm about an hour before bedtime. Turn down the lights, turn off the TV, log off the computer, and turn down the volume on music. This can help you relax after a busy day. Drowsy Driving: The [facility] cites drowsiness as a causing factor in more than 686,889 police reported crashes annually, resulting in 76,000 injuries and 1,500 deaths. Other surveys suggest 55% of people polled have driven while drowsy in the past year, 23% had fallen asleep but not crashed, 3% crashed, and 2% had and accident due to drowsy driving. Who is at risk? Young Drivers: One study of drowsy driving accidents states that 55% of the drivers were under 25 years. Of those, 75% were male. Shift Workers and Travelers: People who work overnight or travel across time zones frequently are at higher risk of experiencing Circadian Rhythm Disorders. They are trying to work and function when their body is programed to sleep. Sleep Deprived: Lack of sleep has a serious impact on your ability to pay attention or focus on a task. Consistently getting less than the average of 8 hours your body needs creates partial or cumulative sleep deprivation. Untreated Sleep Disorders: Sleep Apnea, Narcolepsy, R.L.S., and other sleep disorders (untreated) prevent a person from getting enough restful sleep. This leads to excessive daytime sleepiness and increases the risk for drowsy driving accidents by up to 7 times.   Medications / Alcohol: Even over the counter medications can cause drowsiness. Medications that impair a drivers attention should have a warning label. Alcohol naturally makes you sleepy and on its own can cause accidents. Combined with excessive drowsiness its effects are amplified. Signs of Drowsy Driving:   * You don't remember driving the last few miles   * You may drift out of your dayan   * You are unable to focus and your thoughts wander   * You may yawn more often than normal   * You have difficulty keeping your eyes open / nodding off   * Missing traffic signs, speeding, or tailgating  Prevention-   Good sleep hygiene, lifestyle and behavioral choices have the most impact on drowsy driving. There is no substitute for sleep and the average person requires 8 hours nightly. If you find yourself driving drowsy, stop and sleep. Consider the sleep hygiene tips provided during your visit as well. Medication Refill Policy: Refills for all medications require 1 week advance notice. Please have your pharmacy fax a refill request. We are unable to fax, or call in \"controled substance\" medications and you will need to pick these prescriptions up from our office. [email or web address] Activation    Thank you for requesting access to [email or web address]. Please follow the instructions below to securely access and download your online medical record. [email or web address] allows you to send messages to your doctor, view your test results, renew your prescriptions, schedule appointments, and more. How Do I Sign Up? 1. In your internet browser, go to [email or web address]. 2. Click on the First Time User? Click Here link in the Sign In box. You will see the New Member Sign Up page. 3. Enter your [email or web address] Access Code exactly as it appears below. You will not need to use this code after youve completed the sign-up process. If you do not sign up before the expiration date, you must request a new code. [email or web address] Access Code:  Activation code not generated  Current DP7 Digital Status: Active (This is the date your DP7 Digital access code will )    4. Enter the last four digits of your Social Security Number (xxxx) and Date of Birth (mm/dd/yyyy) as indicated and click Submit. You will be taken to the next sign-up page. 5. Create a DailyWortht ID. This will be your DailyWortht login ID and cannot be changed, so think of one that is secure and easy to remember. 6. Create a DP7 Digital password. You can change your password at any time. 7. Enter your Password Reset Question and Answer. This can be used at a later time if you forget your password. 8. Enter your e-mail address. You will receive e-mail notification when new information is available in [address]. 9. Click Sign Up. You can now view and download portions of your medical record. 10. Click the Download Summary menu link to download a portable copy of your medical information. Additional Information    If you have questions, please call [phone]. Remember, DP7 Digital is NOT to be used for urgent needs. For medical emergencies, dial 911.

## 2017-06-15 LAB
CREATININE, EXTERNAL: 0.77
HBA1C MFR BLD HPLC: 9.3 %
MICROALBUMIN UR TEST STR-MCNC: <3 MG/DL

## 2017-06-28 ENCOUNTER — HOSPITAL ENCOUNTER (OUTPATIENT)
Dept: SLEEP MEDICINE | Age: 57
Discharge: HOME | End: 2017-06-28
Payer: COMMERCIAL

## 2017-06-28 PROCEDURE — 95811 POLYSOM 6/>YRS CPAP 4/> PARM: CPT | Performed by: INTERNAL MEDICINE

## 2017-06-29 ENCOUNTER — TELEPHONE (OUTPATIENT)
Dept: SLEEP MEDICINE | Age: 57
End: 2017-06-29

## 2017-06-29 VITALS
DIASTOLIC BLOOD PRESSURE: 69 MMHG | HEART RATE: 82 BPM | WEIGHT: 209.7 LBS | OXYGEN SATURATION: 91 % | SYSTOLIC BLOOD PRESSURE: 113 MMHG | BODY MASS INDEX: 32.84 KG/M2

## 2017-06-29 DIAGNOSIS — K21.9 GASTROESOPHAGEAL REFLUX DISEASE WITHOUT ESOPHAGITIS: ICD-10-CM

## 2017-06-29 DIAGNOSIS — G47.33 OSA (OBSTRUCTIVE SLEEP APNEA): Primary | ICD-10-CM

## 2017-06-29 NOTE — TELEPHONE ENCOUNTER
Orders Placed This Encounter    AMB SUPPLY ORDER     Diagnosis: (G47.33) EDITH (obstructive sleep apnea)  (primary encounter diagnosis)     Positive Airway Pressure Therapy: Duration of need: 99 months. Respironics DreamStation ( Unit - CPAP Mode):   CPAP: 12 cmH2O; CPAP Check enabled. Ramp Time: 30 Minutes; Flex: 2. Remote monitoring enrollment.  Heated Humidifier     Oral/Nasal Combo Mask 1 every 3 months.  Oral Cushion Combo Mask (Replace) 2 per month.  Nasal Pillows Combo Mask (Replace) 2 per month.  Full Face Mask 1 every 3 months.  Full Face Mask Cushion 1 per month.  Nasal Cushion (Replace) 2 per month.  Nasal Pillows (Replace) 2 per month.  Nasal Interface Mask 1 every 3 months.  Headgear 1 every 6 months.  Chinstrap 1 every 6 months.  Tubing 1 every 3 months.  Filter(s) Disposable 2 per month.  Filter(s) Non-Disposable 1 every 6 months.  Oral Interface 1 every 3 months. [address] for [employer] (Replace) 1 every 6 months.  Tubing with heating element 1 every 3 months. Perform Mask Fitting per patient preference and comfort - replace as above. Sonia Owens MD, [facility]; NPI: [id number]  Electronically signed.  06/29/17

## 2017-06-30 RX ORDER — ERGOCALCIFEROL 1.25 MG/1
CAPSULE ORAL
Qty: 9 CAP | Refills: 12 | Status: SHIPPED | OUTPATIENT
Start: 2017-06-30 | End: 2019-04-08

## 2017-06-30 RX ORDER — OMEPRAZOLE 20 MG/1
CAPSULE, DELAYED RELEASE ORAL
Qty: 30 CAP | Refills: 5 | Status: SHIPPED | OUTPATIENT
Start: 2017-06-30

## 2017-07-03 ENCOUNTER — DOCUMENTATION ONLY (OUTPATIENT)
Dept: SLEEP MEDICINE | Age: 57
End: 2017-07-03

## 2017-10-12 ENCOUNTER — TELEPHONE (OUTPATIENT)
Dept: FAMILY MEDICINE CLINIC | Age: 57
End: 2017-10-12

## 2017-10-18 ENCOUNTER — OFFICE VISIT (OUTPATIENT)
Dept: FAMILY MEDICINE CLINIC | Age: 57
End: 2017-10-18

## 2017-10-18 VITALS
OXYGEN SATURATION: 97 % | BODY MASS INDEX: 32.15 KG/M2 | RESPIRATION RATE: 20 BRPM | HEART RATE: 60 BPM | WEIGHT: 204.8 LBS | SYSTOLIC BLOOD PRESSURE: 107 MMHG | HEIGHT: 67 IN | DIASTOLIC BLOOD PRESSURE: 68 MMHG | TEMPERATURE: 97.7 F

## 2017-10-18 DIAGNOSIS — Z79.899 ENCOUNTER FOR LONG-TERM CURRENT USE OF MEDICATION: ICD-10-CM

## 2017-10-18 DIAGNOSIS — E78.00 HYPERCHOLESTEROLEMIA: ICD-10-CM

## 2017-10-18 DIAGNOSIS — Z11.59 ENCOUNTER FOR HEPATITIS C SCREENING TEST FOR LOW RISK PATIENT: ICD-10-CM

## 2017-10-18 DIAGNOSIS — E04.9 THYROID GOITER: ICD-10-CM

## 2017-10-18 DIAGNOSIS — M85.80 OSTEOPENIA, UNSPECIFIED LOCATION: ICD-10-CM

## 2017-10-18 DIAGNOSIS — Z91.14 NONCOMPLIANCE WITH MEDICATION REGIMEN: ICD-10-CM

## 2017-10-18 DIAGNOSIS — G47.33 OBSTRUCTIVE SLEEP APNEA SYNDROME: ICD-10-CM

## 2017-10-18 DIAGNOSIS — E55.9 VITAMIN D DEFICIENCY: ICD-10-CM

## 2017-10-18 LAB
ALBUMIN UR QL STRIP: 10 MG/L
BILIRUB UR QL STRIP: NEGATIVE
CREATININE, URINE POC: 50 MG/DL
GLUCOSE UR-MCNC: NORMAL MG/DL
KETONES P FAST UR STRIP-MCNC: NEGATIVE MG/DL
MICROALBUMIN/CREAT RATIO POC: <30 MG/G
PH UR STRIP: 5 [PH] (ref 4.6–8)
PROT UR QL STRIP: NEGATIVE MG/DL
SP GR UR STRIP: 1.01 (ref 1–1.03)
UA UROBILINOGEN AMB POC: NORMAL (ref 0.2–1)
URINALYSIS CLARITY POC: CLEAR
URINALYSIS COLOR POC: YELLOW
URINE BLOOD POC: NEGATIVE
URINE LEUKOCYTES POC: NEGATIVE
URINE NITRITES POC: NEGATIVE

## 2017-10-18 RX ORDER — GLIPIZIDE 10 MG/1
TABLET, FILM COATED, EXTENDED RELEASE ORAL
Qty: 60 TAB | Refills: 5 | Status: SHIPPED | OUTPATIENT
Start: 2017-10-18 | End: 2017-12-22 | Stop reason: ALTCHOICE

## 2017-10-18 NOTE — PROGRESS NOTES
Identified pt with two pt identifiers(name and ). Chief Complaint   Patient presents with    Diabetes     needs to see Dr Elisa Quan but insurance will be a problem soon     Cholesterol Problem        Health Maintenance Due   Topic    Hepatitis C Screening        Wt Readings from Last 3 Encounters:   10/18/17 204 lb 12.8 oz (92.9 kg)   17 209 lb 11.2 oz (95.1 kg)   17 208 lb (94.3 kg)     Temp Readings from Last 3 Encounters:   10/18/17 97.7 °F (36.5 °C) (Oral)   17 97.9 °F (36.6 °C) (Oral)   16 97.7 °F (36.5 °C) (Oral)     BP Readings from Last 3 Encounters:   10/18/17 107/68   17 113/69   17 119/74     Pulse Readings from Last 3 Encounters:   10/18/17 60   17 82   17 74         Learning Assessment:  :     Learning Assessment 2017   PRIMARY LEARNER Patient Patient   HIGHEST LEVEL OF EDUCATION - PRIMARY LEARNER  - 2 YEARS OF COLLEGE   BARRIERS PRIMARY LEARNER - NONE   CO-LEARNER CAREGIVER - No   PRIMARY LANGUAGE ENGLISH ENGLISH   LEARNER PREFERENCE PRIMARY DEMONSTRATION DEMONSTRATION   ANSWERED BY patient self   RELATIONSHIP SELF SELF       Depression Screening:  :     PHQ over the last two weeks 10/18/2017   Little interest or pleasure in doing things Not at all   Feeling down, depressed or hopeless Not at all   Total Score PHQ 2 0       Fall Risk Assessment:  :     Fall Risk Assessment, last 12 mths 10/18/2017   Able to walk? Yes   Fall in past 12 months? No   Fall with injury? -   Number of falls in past 12 months -   Fall Risk Score -       Abuse Screening:  :     Abuse Screening Questionnaire 10/18/2017 2017 2016 2014   Do you ever feel afraid of your partner? N N N N   Are you in a relationship with someone who physically or mentally threatens you? N N N N   Is it safe for you to go home?  Y Y Y Y       Coordination of Care Questionnaire:  :     1) Have you been to an emergency room, urgent care clinic since your last visit? no Hospitalized since your last visit? no             2) Have you seen or consulted any other health care providers outside of [address] [place] [address] since your last visit? yes  Dr Misael Boone, Dr Starla Sharma, Dr Faraz Cervantes  (Include any pap smears or colon screenings in this section.)    3) Do you have an Advance Directive on file? no  Are you interested in receiving information about Advance Directives? no    Reviewed record in preparation for visit and have obtained necessary documentation. Medication reconciliation up to date and corrected with patient at this time. Called to come back for lab. She was next door.     Results for orders placed or performed in visit on 10/18/17   AMB POC URINALYSIS DIP STICK AUTO W/O MICRO   Result Value Ref Range    Color (UA POC) Yellow     Clarity (UA POC) Clear     Glucose (UA POC) 2+ Negative    Bilirubin (UA POC) Negative Negative    Ketones (UA POC) Negative Negative    Specific gravity (UA POC) 1.015 1.001 - 1.035    Blood (UA POC) Negative Negative    pH (UA POC) 5.0 4.6 - 8.0    Protein (UA POC) Negative Negative mg/dL    Urobilinogen (UA POC) 0.2 mg/dL 0.2 - 1    Nitrites (UA POC) Negative Negative    Leukocyte esterase (UA POC) Negative Negative   AMB POC URINE, MICROALBUMIN, SEMIQUANT (3 RESULTS)   Result Value Ref Range    ALBUMIN, URINE POC 10 Negative mg/L    CREATININE, URINE POC 50 mg/dL    Microalbumin/creat ratio (POC) <30 MG/G

## 2017-10-18 NOTE — PROGRESS NOTES
The patient was seen today with NP student. I have reviewed this note and agree with accuracy of information and plan of management. DONATO is a 61yo AA female presenting today for follow up of chronic medical issues including uncontrolled diabetes mellitus type 2, GERD, hypertension, high cholesterol, and sleep apnea. Hypertension/Hypercholesterolemia  Patient takes losartan regularly, does not always take pravastatin. Denies chest pain, shortness of breath, headache, lightheadedness, some ankle swelling. Type II DM, uncontrolled  Patient currently taking insulin, and oral anti-diabetics regularly. Reports good adherence to medication regimen. Checking blood sugars not regularly. Fasting readings range from upper 100's and lowers 200's when fasting. Following up with Endocrinologist.   Denies polydipsia, polyphagia, blurred vision. Increase urinary urgency and frequency. Some on-and-off numbness in the feet. Denies hypoglycemic episodes. A1C last June/2017 was 9.3. Last microalbumin: January 2017  Last ophthalmology exam: June 2017. Last foot exam: January 2017  On ACE-I? On ARB   Drinks and sweets are her issue, reports that she likes fried foods doesn't like vegetables, reports that she is very picky. Doesn't do diet-pepsi. Other specific concerns: going to lose her insurance at the end of the month because she resigned from her job.     Past Medical History:   Diagnosis Date    Arthritis     GERD (gastroesophageal reflux disease)     Heart murmur 11/8/2010    Sleep apnea 11/8/2010     Past Surgical History:   Procedure Laterality Date    HX HEENT      uvuloplasty    HX HYSTERECTOMY      HX ORTHOPAEDIC       Family History   Problem Relation Age of Onset    Hypertension Mother     Diabetes Father     Heart Disease Father     Diabetes Maternal Grandmother     Diabetes Paternal Grandmother        Allergies   Allergen Reactions    Lisinopril Cough    Penicillin G Swelling Current Outpatient Prescriptions on File Prior to Visit   Medication Sig Dispense Refill    pravastatin (PRAVACHOL) 40 mg tablet TAKE ONE TABLET BY MOUTH NIGHTLY 90 Tab 1    omeprazole (PRILOSEC) 20 mg capsule TAKE ONE CAPSULE BY MOUTH ONCE DAILY 30 Cap 5    VITAMIN D2 50,000 unit capsule TAKE ONE CAPSULE BY MOUTH TWICE A WEEK 9 Cap 12    GLYXAMBI 25-5 mg tab TAKE ONE TABLET BY MOUTH ONCE DAILY 30 Tab 0    losartan (COZAAR) 25 mg tablet Take  by mouth daily.  BD AUTOSHIELD DUO PEN NEEDLE 30 gauge x 3/16\" ndle       glucose blood VI test strips (BLOOD GLUCOSE TEST) strip Accu Check Siomara Smart View - test strips DX E11.9 Test blood sugar twice daily. 200 Strip 3    Lancets misc Accu Check Siomara Smart View Fast Click Drum. DX E11.9 Test blood sugar twice daily 200 Each 3    Blood-Glucose Meter monitoring kit ONE TOUCH ULTRA. Test once a day. .02 1 kit 0    aspirin delayed-release 81 mg tablet Take 81 mg by mouth daily.  cyclobenzaprine (FLEXERIL) 10 mg tablet TAKE ONE TABLET BY MOUTH NIGHTLY 90 Tab 0     No current facility-administered medications on file prior to visit. Objective:    Visit Vitals    /68 (BP 1 Location: Right arm, BP Patient Position: Sitting)    Pulse 60    Temp 97.7 °F (36.5 °C) (Oral)    Resp 20    Ht 5' 7\" (1.702 m)    Wt 204 lb 12.8 oz (92.9 kg)    SpO2 97%    BMI 32.08 kg/m2     General: alert, oriented, pleasant, no signs of distress    CVS exam: normal rate, regular rhythm, normal S1, S2, no murmurs, rubs, clicks or gallops, no peripheral edema in lower extremity    Resp: CTAB    Assessment/Plan:  1. Uncontrolled Type 2 Diabetes mellitus with diabetic cataract, with long-term current use of insulin (HCC) 11.36  - Labs today: A1c, Urine Microalbumin/Creat Ratio  2. Thyroid goiter E04.9  - Labs today: TSH, T4?  3. Hypercholesterolemia E78.00  4. Nuclear Cataract H24.10  5. Vitamin D deficiency E55.  - Labs today: Vit D  6.  Encounter for long-term current use of medication Z79.899  7. Noncompliance with medication Z79.14  8. Encounter for Hepatitis C screening test for low risk patient Z11.59  - Labs today: Hep C screening    1. Uncontrolled type 2 diabetes mellitus with diabetic cataract, with long-term current use of insulin ([facility]    2. Thyroid goiter    3. Hypercholesterolemia    4. Nuclear cataract    5. Vitamin D deficiency    6. Encounter for long-term current use of medication    7. Noncompliance with medication regimen    8. Encounter for hepatitis C screening test for low risk patient    9. Uncontrolled type 2 diabetes mellitus without complication, with long-term current use of insulin ([facility]    10. Obstructive sleep apnea syndrome    11. Osteopenia, unspecified location       Orders Placed This Encounter    METABOLIC PANEL, COMPREHENSIVE    TSH 3RD GENERATION    LIPID PANEL    VITAMIN D, 25 HYDROXY    HEMOGLOBIN A1C WITH EAG    HEPATITIS C AB    AMB POC URINALYSIS DIP STICK AUTO W/O MICRO    AMB POC URINE, MICROALBUMIN, SEMIQUANT (3 RESULTS)    cpap machine kit     Sig: by Does Not Apply route.  insulin glargine (TOUJEO SOLOSTAR) 300 unit/mL (1.5 mL) inpn     Si Units by SubCUTAneous route daily. Dispense:  4.5 mL     Refill:  5    glipiZIDE SR (GLUCOTROL XL) 10 mg CR tablet     Sig: TAKE TWO TABLETS BY MOUTH ONCE DAILY     Dispense:  60 Tab     Refill:  5      Pt declined FLU vaccine.

## 2017-10-18 NOTE — MR AVS SNAPSHOT
Visit Information Date & Time Provider Department Dept. Phone Encounter #  
 61/12/7481 48:97 AM Umang Kraft [place] [phone] [id number] Upcoming Health Maintenance Date Due Hepatitis C Screening 1960 HEMOGLOBIN A1C Q6M 12/15/2017 FOOT EXAM Q1 1/6/2018 MICROALBUMIN Q1 6/15/2018 LIPID PANEL Q1 6/15/2018 EYE EXAM RETINAL OR DILATED Q1 6/29/2018 BREAST CANCER SCRN MAMMOGRAM 4/11/2019 PAP AKA CERVICAL CYTOLOGY 6/27/2019 COLONOSCOPY 5/15/2025 DTaP/Tdap/Td series (2 - Td) 1/6/2027 Allergies as of 10/18/2017  Review Complete On: 04/88/7236 By: Umang Kraft MD  
  
 Severity Noted Reaction Type Reactions Lisinopril  02/05/2016    Cough Penicillin G  11/08/2010    Swelling Current Immunizations  Reviewed on 10/18/2017 Name Date Pneumococcal Polysaccharide (PPSV-23) 6/27/2016 Reviewed by Umang Kraft MD on 10/18/2017 at 11:21 AM  
You Were Diagnosed With   
  
 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, with long-term current use of insulin ([facility]    -  Primary ICD-10-CM: E11.36, Z79.4, E11.65 ICD-9-CM: 250.52, 366.41, V58.67 Thyroid goiter     ICD-10-CM: E04.9 ICD-9-CM: 240.9 Hypercholesterolemia     ICD-10-CM: E78.00 ICD-9-CM: 272.0 Nuclear cataract     ICD-10-CM: H25.10 ICD-9-CM: 366.16 Vitamin D deficiency     ICD-10-CM: E55.9 ICD-9-CM: 268.9 Encounter for long-term current use of medication     ICD-10-CM: Z79.899 ICD-9-CM: V58.69 Noncompliance with medication regimen     ICD-10-CM: Z91.14 
ICD-9-CM: V15.81 Encounter for hepatitis C screening test for low risk patient     ICD-10-CM: Z11.59 
ICD-9-CM: V73.89 Uncontrolled type 2 diabetes mellitus without complication, with long-term current use of insulin (HCC)     ICD-10-CM: E11.65, Z79.4 ICD-9-CM: 250.02, V58.67 Vitals BP Pulse Temp Resp Height(growth percentile) Weight(growth percentile) 107/68 (BP 1 Location: Right arm, BP Patient Position: Sitting) 60 97.7 °F (36.5 °C) (Oral) 20 5' 7\" (1.702 m) 204 lb 12.8 oz (92.9 kg) LMP SpO2 BMI OB Status Smoking Status 10/14/1987 97% 32.08 kg/m2 Hysterectomy Never Smoker BMI and BSA Data Body Mass Index Body Surface Area 32.08 kg/m 2 2.1 m 2 Preferred Pharmacy Pharmacy Name Phone [facility] [address] [facility] [place] [phone] Your Updated Medication List  
  
   
This list is accurate as of: 10/18/17 11:47 AM.  Always use your most recent med list.  
  
  
  
  
 aspirin delayed-release 81 mg tablet Take 81 mg by mouth daily. BD AUTOSHIELD DUO PEN NEEDLE 30 gauge x 3/16\" Ndle Generic drug:  pen needle,diabetic dual safty Blood-Glucose Meter monitoring kit ONE TOUCH ULTRA. Test once a day. .02  
  
 cpap machine kit  
by Does Not Apply route. cyclobenzaprine 10 mg tablet Commonly known as:  FLEXERIL  
TAKE ONE TABLET BY MOUTH NIGHTLY  
  
 glipiZIDE SR 10 mg CR tablet Commonly known as:  GLUCOTROL XL  
TAKE TWO TABLETS BY MOUTH ONCE DAILY  
  
 glucose blood VI test strips strip Commonly known as:  blood glucose test  
Accu Check Siomara Smart View - test strips DX E11.9 Test blood sugar twice daily. GLYXAMBI 25-5 mg Tab Generic drug:  empagliflozin-linagliptin TAKE ONE TABLET BY MOUTH ONCE DAILY  
  
 insulin glargine 300 unit/mL (1.5 mL) Inpn Commonly known as:  TOUJEO SOLOSTAR  
120 Units by SubCUTAneous route daily. Lancets Misc Accu Check Siomara Smart View Fast Click Drum. DX E11.9 Test blood sugar twice daily  
  
 losartan 25 mg tablet Commonly known as:  COZAAR Take  by mouth daily. omeprazole 20 mg capsule Commonly known as:  PRILOSEC  
TAKE ONE CAPSULE BY MOUTH ONCE DAILY pravastatin 40 mg tablet Commonly known as:  PRAVACHOL  
 TAKE ONE TABLET BY MOUTH NIGHTLY  
  
 VITAMIN D2 50,000 unit capsule Generic drug:  ergocalciferol TAKE ONE CAPSULE BY MOUTH TWICE A WEEK Prescriptions Sent to Pharmacy Refills  
 glipiZIDE SR (GLUCOTROL XL) 10 mg CR tablet 5 Sig: TAKE TWO TABLETS BY MOUTH ONCE DAILY Class: Normal  
 Pharmacy: [address] [place] Ph #: [phone] We Performed the Following AMB POC URINALYSIS DIP STICK AUTO W/O MICRO [91312 CPT(R)] AMB POC URINE, MICROALBUMIN, SEMIQUANT (5 RESULTS) [66579 CPT(R)] HEMOGLOBIN A1C WITH EAG [86662 CPT(R)] HEPATITIS C AB [15624 CPT(R)] LIPID PANEL [80154 CPT(R)] METABOLIC PANEL, COMPREHENSIVE [16441 CPT(R)] TSH 3RD GENERATION [62574 CPT(R)] VITAMIN D, 25 HYDROXY [id number] CPT(R)] Patient Instructions Diabetes and Preventing Falls: Care Instructions Your Care Instructions If you are an older adult who has diabetes, you may have a higher risk of falling. Complications of diabetessuch as nerve damage, foot problems, and reduced visionmay increase your risk of a fall. Some of your medicines also may add to your risk. By making your home safer, you can lower your risk of falling. Doing things to prevent diabetes complications may also help to lower your risk. You can make your home safer with a few simple measures. Follow-up care is a key part of your treatment and safety. Be sure to make and go to all appointments, and call your doctor if you are having problems. It's also a good idea to know your test results and keep a list of the medicines you take. How can you care for yourself at home? Taking care of yourself · Keep your blood sugar at a target level (which you set with your doctor). · Exercise regularly to improve your strength, muscle tone, and balance. Walk if you can. Swimming may be a good choice if you cannot walk easily. · Have your vision checked as often as your doctor recommends. It is usually once a year or more often if you have eye problems. · Know the side effects of the medicines you take. Ask your doctor or pharmacist whether the medicines you take can affect your balance. Sleeping pills or sedatives can affect your balance. · Limit the amount of alcohol you drink. Alcohol can impair your balance and other senses. · Have your doctor check your feet during each visit. If you have a foot problem, see your doctor. Preventing falls at home · Remove raised doorway thresholds, throw rugs, and clutter. Repair loose carpet or raised areas in the floor. · Move furniture and electrical cords to keep them out of walking paths. · Use nonskid floor wax, and wipe up spills right away, especially on ceramic tile floors. · If you use a walker or cane, put rubber tips on it. If you use crutches, clean the bottoms of them regularly with an abrasive pad, such as steel wool. · Keep your house well lit, especially [place], and outside walkways. Use night-lights in areas such as hallways and bathrooms. Add extra light switches or use remote switches (such as switches that go on or off when you clap your hands) to make it easier to turn lights on if you have to get up during the night. · Install sturdy handrails on stairways. Put grab bars near your shower, bathtub, and toilet. · Store household items on low shelves so that you do not have to climb or reach high. Or use a reaching device that you can get at a medical supply store. If you have to climb for something, use a step stool with handrails, or ask someone to get it for you. · Keep a cordless phone and a flashlight with new batteries by your bed. If possible, put a phone in each of the main rooms of your house, or carry a cell phone in case you fall and cannot reach a phone.  Or you can wear a device around your neck or wrist. You push a button that sends a signal for help. · Wear low-heeled shoes that fit well and give your feet good support. Use footwear with nonskid soles. Check the heels and soles of your shoes for wear. Repair or replace worn heels or soles. · Do not wear socks without shoes on wood floors. · Walk on the grass when the sidewalks are slippery. If you live in an area that gets snow and ice in the winter, sprinkle salt on slippery steps and sidewalks. Where can you learn more? Go to http://eulogio-lior.info/. Enter Q119 in the search box to learn more about \"Diabetes and Preventing Falls: Care Instructions. \" Current as of: August 4, 2016 Content Version: 11.3 © 2947-8093 [employer]. Care instructions adapted under license by [employer] (which disclaims liability or warranty for this information). If you have questions about a medical condition or this instruction, always ask your healthcare professional. [address] any warranty or liability for your use of this information. Introducing [facility]! Dear Eric Holliday: Thank you for requesting a [employer] account. Our records indicate that you already have an active [employer] account. You can access your account anytime at [email or web address] Did you know that you can access your hospital and ER discharge instructions at any time in Football Meister? You can also review all of your test results from your hospital stay or ER visit. Additional Information If you have questions, please visit the Frequently Asked Questions section of the [employer] website at [email or web address] Remember, [employer] is NOT to be used for urgent needs. For medical emergencies, dial 911. Now available from your iPhone and Android! Please provide this summary of care documentation to your next provider. Your primary care clinician is listed as Brian Garcias.  If you have any questions after today's visit, please call [phone].

## 2017-10-18 NOTE — PATIENT INSTRUCTIONS
Diabetes and Preventing Falls: Care Instructions  Your Care Instructions  If you are an older adult who has diabetes, you may have a higher risk of falling. Complications of diabetessuch as nerve damage, foot problems, and reduced visionmay increase your risk of a fall. Some of your medicines also may add to your risk. By making your home safer, you can lower your risk of falling. Doing things to prevent diabetes complications may also help to lower your risk. You can make your home safer with a few simple measures. Follow-up care is a key part of your treatment and safety. Be sure to make and go to all appointments, and call your doctor if you are having problems. It's also a good idea to know your test results and keep a list of the medicines you take. How can you care for yourself at home? Taking care of yourself  · Keep your blood sugar at a target level (which you set with your doctor). · Exercise regularly to improve your strength, muscle tone, and balance. Walk if you can. Swimming may be a good choice if you cannot walk easily. · Have your vision checked as often as your doctor recommends. It is usually once a year or more often if you have eye problems. · Know the side effects of the medicines you take. Ask your doctor or pharmacist whether the medicines you take can affect your balance. Sleeping pills or sedatives can affect your balance. · Limit the amount of alcohol you drink. Alcohol can impair your balance and other senses. · Have your doctor check your feet during each visit. If you have a foot problem, see your doctor. Preventing falls at home  · Remove raised doorway thresholds, throw rugs, and clutter. Repair loose carpet or raised areas in the floor. · Move furniture and electrical cords to keep them out of walking paths. · Use nonskid floor wax, and wipe up spills right away, especially on ceramic tile floors. · If you use a walker or cane, put rubber tips on it.  If you use crutches, clean the bottoms of them regularly with an abrasive pad, such as steel wool. · Keep your house well lit, especially [place], and outside walkways. Use night-lights in areas such as hallways and bathrooms. Add extra light switches or use remote switches (such as switches that go on or off when you clap your hands) to make it easier to turn lights on if you have to get up during the night. · Install sturdy handrails on stairways. Put grab bars near your shower, bathtub, and toilet. · Store household items on low shelves so that you do not have to climb or reach high. Or use a reaching device that you can get at a medical supply store. If you have to climb for something, use a step stool with handrails, or ask someone to get it for you. · Keep a cordless phone and a flashlight with new batteries by your bed. If possible, put a phone in each of the main rooms of your house, or carry a cell phone in case you fall and cannot reach a phone. Or you can wear a device around your neck or wrist. You push a button that sends a signal for help. · Wear low-heeled shoes that fit well and give your feet good support. Use footwear with nonskid soles. Check the heels and soles of your shoes for wear. Repair or replace worn heels or soles. · Do not wear socks without shoes on wood floors. · Walk on the grass when the sidewalks are slippery. If you live in an area that gets snow and ice in the winter, sprinkle salt on slippery steps and sidewalks. Where can you learn more? Go to http://eulogio-lior.info/. Enter D331 in the search box to learn more about \"Diabetes and Preventing Falls: Care Instructions. \"  Current as of: August 4, 2016  Content Version: 11.3  © 4744-2936 [employer]. Care instructions adapted under license by [employer] (which disclaims liability or warranty for this information).  If you have questions about a medical condition or this instruction, always ask your healthcare professional. [place] [address] any warranty or liability for your use of this information.

## 2017-10-18 NOTE — PROGRESS NOTES
BB is a 61yo AA female presenting today for follow up of chronic medical issues including uncontrolled diabetes mellitus type 2, GERD, hypertension, high cholesterol, and sleep apnea. Hypertension/Hypercholesterolemia  Patient takes losartan regularly, does not always take pravastatin. Denies chest pain, shortness of breath, headache, lightheadedness, some ankle swelling. Type II DM, uncontrolled  Patient currently taking insulin, and oral anti-diabetics regularly. Reports good adherence to medication regimen. Checking blood sugars not regularly. Fasting readings range from upper 100's and lowers 200's when fasting. Following up with Endocrinologist.   Denies polydipsia, polyphagia, blurred vision. Increase urinary urgency and frequency. Some on-and-off numbness in the feet. Denies hypoglycemic episodes. A1C last June/2017 was 9.3. Last microalbumin: January 2017  Last ophthalmology exam: June 2017. Last foot exam: January 2017  On ACE-I? On ARB   Drinks and sweets are her issue, reports that she likes fried foods doesn't like vegetables, reports that she is very picky. Doesn't do diet-pepsi. Other specific concerns: going to lose her insurance at the end of the month because she resigned from her job.     Past Medical History:   Diagnosis Date    Arthritis     Diabetes ([address] 11/8/2010    GERD (gastroesophageal reflux disease)     Heart murmur 11/8/2010    Ill-defined condition     sleep apnea    Sleep apnea 11/8/2010     Past Surgical History:   Procedure Laterality Date    HX HEENT      uvuloplasty    HX HYSTERECTOMY      HX ORTHOPAEDIC       Family History   Problem Relation Age of Onset    Hypertension Mother     Diabetes Father     Heart Disease Father     Diabetes Maternal Grandmother     Diabetes Paternal Grandmother        Allergies   Allergen Reactions    Lisinopril Cough    Penicillin G Swelling     Current Outpatient Prescriptions on File Prior to Visit   Medication Sig Dispense Refill    pravastatin (PRAVACHOL) 40 mg tablet TAKE ONE TABLET BY MOUTH NIGHTLY 90 Tab 1    omeprazole (PRILOSEC) 20 mg capsule TAKE ONE CAPSULE BY MOUTH ONCE DAILY 30 Cap 5    VITAMIN D2 50,000 unit capsule TAKE ONE CAPSULE BY MOUTH TWICE A WEEK 9 Cap 12    glipiZIDE SR (GLUCOTROL XL) 10 mg CR tablet TAKE TWO TABLETS BY MOUTH ONCE DAILY 60 Tab 5    GLYXAMBI 25-5 mg tab TAKE ONE TABLET BY MOUTH ONCE DAILY 30 Tab 0    losartan (COZAAR) 25 mg tablet Take  by mouth daily.  BD AUTOSHIELD DUO PEN NEEDLE 30 gauge x 3/16\" ndle       glucose blood VI test strips (BLOOD GLUCOSE TEST) strip Accu Check Siomara Smart View - test strips DX E11.9 Test blood sugar twice daily. 200 Strip 3    Lancets misc Accu Check Siomara Smart View Fast Click Drum. DX E11.9 Test blood sugar twice daily 200 Each 3    Blood-Glucose Meter monitoring kit ONE TOUCH ULTRA. Test once a day. .02 1 kit 0    aspirin delayed-release 81 mg tablet Take 81 mg by mouth daily.  cyclobenzaprine (FLEXERIL) 10 mg tablet TAKE ONE TABLET BY MOUTH NIGHTLY 90 Tab 0     No current facility-administered medications on file prior to visit. Objective:    Visit Vitals    /68 (BP 1 Location: Right arm, BP Patient Position: Sitting)    Pulse 60    Temp 97.7 °F (36.5 °C) (Oral)    Resp 20    Ht 5' 7\" (1.702 m)    Wt 204 lb 12.8 oz (92.9 kg)    SpO2 97%    BMI 32.08 kg/m2     General: alert, oriented, pleasant, no signs of distress    CVS exam: normal rate, regular rhythm, normal S1, S2, no murmurs, rubs, clicks or gallops, no peripheral edema in lower extremity    Resp: CTAB    Assessment/Plan:  1. Uncontrolled Type 2 Diabetes mellitus with diabetic cataract, with long-term current use of insulin (HCC) 11.36  - Labs today: A1c, Urine Microalbumin/Creat Ratio  2. Thyroid goiter E04.9  - Labs today: TSH, T4?  3. Hypercholesterolemia E78.00  4. Nuclear Cataract H24.10  5. Vitamin D deficiency E55.  - Labs today: Vit D  6. Encounter for long-term current use of medication Z79.899  7. Noncompliance with medication Z79.14  8.  Encounter for Hepatitis C screening test for low risk patient Z11.59  - Labs today: Hep C screening

## 2017-10-19 LAB
25(OH)D3+25(OH)D2 SERPL-MCNC: 33 NG/ML (ref 30–100)
ALBUMIN SERPL-MCNC: 4.3 G/DL (ref 3.5–5.5)
ALBUMIN/GLOB SERPL: 1.7 {RATIO} (ref 1.2–2.2)
ALP SERPL-CCNC: 93 IU/L (ref 39–117)
ALT SERPL-CCNC: 22 IU/L (ref 0–32)
AST SERPL-CCNC: 16 IU/L (ref 0–40)
BILIRUB SERPL-MCNC: 0.4 MG/DL (ref 0–1.2)
BUN SERPL-MCNC: 14 MG/DL (ref 6–24)
BUN/CREAT SERPL: 18 (ref 9–23)
CALCIUM SERPL-MCNC: 9.3 MG/DL (ref 8.7–10.2)
CHLORIDE SERPL-SCNC: 100 MMOL/L (ref 96–106)
CHOLEST SERPL-MCNC: 157 MG/DL (ref 100–199)
CO2 SERPL-SCNC: 25 MMOL/L (ref 18–29)
CREAT SERPL-MCNC: 0.77 MG/DL (ref 0.57–1)
EST. AVERAGE GLUCOSE BLD GHB EST-MCNC: 243 MG/DL
GFR SERPLBLD CREATININE-BSD FMLA CKD-EPI: 85 ML/MIN/1.73
GFR SERPLBLD CREATININE-BSD FMLA CKD-EPI: 98 ML/MIN/1.73
GLOBULIN SER CALC-MCNC: 2.6 G/DL (ref 1.5–4.5)
GLUCOSE SERPL-MCNC: 152 MG/DL (ref 65–99)
HBA1C MFR BLD: 10.1 % (ref 4.8–5.6)
HCV AB S/CO SERPL IA: <0.1 S/CO RATIO (ref 0–0.9)
HDLC SERPL-MCNC: 52 MG/DL
INTERPRETATION, 910389: NORMAL
LDLC SERPL CALC-MCNC: 85 MG/DL (ref 0–99)
Lab: NORMAL
POTASSIUM SERPL-SCNC: 4.5 MMOL/L (ref 3.5–5.2)
PROT SERPL-MCNC: 6.9 G/DL (ref 6–8.5)
SODIUM SERPL-SCNC: 141 MMOL/L (ref 134–144)
TRIGL SERPL-MCNC: 100 MG/DL (ref 0–149)
TSH SERPL DL<=0.005 MIU/L-ACNC: 0.95 UIU/ML (ref 0.45–4.5)
VLDLC SERPL CALC-MCNC: 20 MG/DL (ref 5–40)

## 2017-10-20 ENCOUNTER — TELEPHONE (OUTPATIENT)
Dept: FAMILY MEDICINE CLINIC | Age: 57
End: 2017-10-20

## 2017-10-20 NOTE — PROGRESS NOTES
Very high A1C. Need to follow up with ENDO. Good cholesterol and thyroid. Vit D level is fine. Recheck in 3 months.

## 2017-10-20 NOTE — TELEPHONE ENCOUNTER
NP student Kezia Davila called pt  To review labs. A1C is higher 10.1 %. She needs urgent appt to FU with ENDO. She may lose health insurance by end of month. Need OV ASAP. I will ask PSR to facilitate appt.

## 2018-04-25 ENCOUNTER — OFFICE VISIT (OUTPATIENT)
Dept: FAMILY MEDICINE CLINIC | Age: 58
End: 2018-04-25

## 2018-04-25 VITALS
RESPIRATION RATE: 20 BRPM | BODY MASS INDEX: 32.96 KG/M2 | DIASTOLIC BLOOD PRESSURE: 78 MMHG | TEMPERATURE: 97.8 F | HEIGHT: 67 IN | WEIGHT: 210 LBS | HEART RATE: 71 BPM | SYSTOLIC BLOOD PRESSURE: 140 MMHG | OXYGEN SATURATION: 97 %

## 2018-04-25 DIAGNOSIS — E78.00 HYPERCHOLESTEROLEMIA: ICD-10-CM

## 2018-04-25 DIAGNOSIS — Z79.899 ENCOUNTER FOR LONG-TERM CURRENT USE OF MEDICATION: ICD-10-CM

## 2018-04-25 RX ORDER — GLIPIZIDE 10 MG/1
10 TABLET ORAL 2 TIMES DAILY
Qty: 60 TAB | Refills: 5 | Status: SHIPPED | OUTPATIENT
Start: 2018-04-25 | End: 2019-01-25 | Stop reason: SDUPTHER

## 2018-04-25 RX ORDER — LOSARTAN POTASSIUM 25 MG/1
25 TABLET ORAL DAILY
Qty: 30 TAB | Refills: 5 | Status: SHIPPED | OUTPATIENT
Start: 2018-04-25 | End: 2019-01-25 | Stop reason: SDUPTHER

## 2018-04-25 RX ORDER — PRAVASTATIN SODIUM 40 MG/1
TABLET ORAL
Qty: 30 TAB | Refills: 5 | Status: SHIPPED | OUTPATIENT
Start: 2018-04-25 | End: 2019-01-25 | Stop reason: SDUPTHER

## 2018-04-25 NOTE — PROGRESS NOTES
Identified pt with two pt identifiers(name and ). Chief Complaint   Patient presents with    Diabetes     she has lost her job and insurance        Health Maintenance Due   Topic    ZOSTER VACCINE AGE 60>     FOOT EXAM Q1     HEMOGLOBIN A1C Q6M        Wt Readings from Last 3 Encounters:   18 210 lb (95.3 kg)   10/18/17 204 lb 12.8 oz (92.9 kg)   17 209 lb 11.2 oz (95.1 kg)     Temp Readings from Last 3 Encounters:   18 97.8 °F (36.6 °C) (Oral)   10/18/17 97.7 °F (36.5 °C) (Oral)   17 97.9 °F (36.6 °C) (Oral)     BP Readings from Last 3 Encounters:   18 140/78   10/18/17 107/68   17 113/69     Pulse Readings from Last 3 Encounters:   18 71   10/18/17 60   17 82         Learning Assessment:  :     Learning Assessment 2017   PRIMARY LEARNER Patient Patient   HIGHEST LEVEL OF EDUCATION - PRIMARY LEARNER  - 2 YEARS OF COLLEGE   BARRIERS PRIMARY LEARNER - NONE   CO-LEARNER CAREGIVER - No   PRIMARY LANGUAGE ENGLISH ENGLISH   LEARNER PREFERENCE PRIMARY DEMONSTRATION DEMONSTRATION   ANSWERED BY patient self   RELATIONSHIP SELF SELF       Depression Screening:  :     PHQ over the last two weeks 2018   Little interest or pleasure in doing things Not at all   Feeling down, depressed or hopeless Not at all   Total Score PHQ 2 0       Fall Risk Assessment:  :     Fall Risk Assessment, last 12 mths 2018   Able to walk? Yes   Fall in past 12 months? No   Fall with injury? -   Number of falls in past 12 months -   Fall Risk Score -       Abuse Screening:  :     Abuse Screening Questionnaire 2018 10/18/2017 2017 2016 2014   Do you ever feel afraid of your partner? N N N N N   Are you in a relationship with someone who physically or mentally threatens you? N N N N N   Is it safe for you to go home? Y Y Y Y Y       Coordination of Care Questionnaire:  :     1) Have you been to an emergency room, urgent care clinic since your last visit? no   Hospitalized since your last visit? no             2) Have you seen or consulted any other health care providers outside of [address] [place] [address] since your last visit? yes  Dr Dunham(Include any pap smears or colon screenings in this section.)    3) Do you have an Advance Directive on file? no  Are you interested in receiving information about Advance Directives? no    Reviewed record in preparation for visit and have obtained necessary documentation. Medication reconciliation up to date and corrected with patient at this time.

## 2018-04-25 NOTE — PATIENT INSTRUCTIONS
Diabetes Foot Health: Care Instructions  Your Care Instructions    When you have diabetes, your feet need extra care and attention. Diabetes can damage the nerve endings and blood vessels in your feet, making you less likely to notice when your feet are injured. Diabetes also limits your body's ability to fight infection and get blood to areas that need it. If you get a minor foot injury, it could become an ulcer or a serious infection. With good foot care, you can prevent most of these problems. Caring for your feet can be quick and easy. Most of the care can be done when you are bathing or getting ready for bed. Follow-up care is a key part of your treatment and safety. Be sure to make and go to all appointments, and call your doctor if you are having problems. It's also a good idea to know your test results and keep a list of the medicines you take. How can you care for yourself at home? · Keep your blood sugar close to normal by watching what and how much you eat, monitoring blood sugar, taking medicines if prescribed, and getting regular exercise. · Do not smoke. Smoking affects blood flow and can make foot problems worse. If you need help quitting, talk to your doctor about stop-smoking programs and medicines. These can increase your chances of quitting for good. · Eat a diet that is low in fats. High fat intake can cause fat to build up in your blood vessels and decrease blood flow. · Inspect your feet daily for blisters, cuts, cracks, or sores. If you cannot see well, use a mirror or have someone help you. · Take care of your feet:  [facility] your feet every day. Use warm (not hot) water. Check the water temperature with your wrists or other part of your body, not your feet. ¨ Dry your feet well. Pat them dry. Do not rub the skin on your feet too hard. Dry well between your toes. If the skin on your feet stays moist, bacteria or a fungus can grow, which can lead to infection.   ¨ Keep your skin soft. Use moisturizing skin cream to keep the skin on your feet soft and prevent calluses and cracks. But do not put the cream between your toes, and stop using any cream that causes a rash. ¨ Clean underneath your toenails carefully. Do not use a sharp object to clean underneath your toenails. Use the blunt end of a nail file or other rounded tool. ¨ Trim and file your toenails straight across to prevent ingrown toenails. Use a nail clipper, not scissors. Use an emery board to smooth the edges. · Change socks daily. Socks without seams are best, because seams often rub the feet. You can find socks for people with diabetes from specialty catalogs. · Look inside your shoes every day for things like gravel or torn linings, which could cause blisters or sores. · Buy shoes that fit well:  ¨ Look for shoes that have plenty of space around the toes. This helps prevent bunions and blisters. ¨ Try on shoes while wearing the kind of socks you will usually wear with the shoes. ¨ Avoid plastic shoes. They may rub your feet and cause blisters. Good shoes should be made of materials that are flexible and breathable, such as leather or cloth. ¨ Break in new shoes slowly by wearing them for no more than an hour a day for several days. Take extra time to check your feet for red areas, blisters, or other problems after you wear new shoes. · Do not go barefoot. Do not wear sandals, and do not wear shoes with very thin soles. Thin soles are easy to puncture. They also do not protect your feet from hot pavement or cold weather. · Have your doctor check your feet during each visit. If you have a foot problem, see your doctor. Do not try to treat an early foot problem at home. Home remedies or treatments that you can buy without a prescription (such as corn removers) can be harmful. · Always get early treatment for foot problems. A minor irritation can lead to a major problem if not properly cared for early.   When should you call for help? Call your doctor now or seek immediate medical care if:  ? · You have a foot sore, an ulcer or break in the skin that is not healing after 4 days, bleeding corns or calluses, or an ingrown toenail. ? · You have blue or black areas, which can mean bruising or blood flow problems. ? · You have peeling skin or tiny blisters between your toes or cracking or oozing of the skin. ? · You have a fever for more than 24 hours and a foot sore. ? · You have new numbness or tingling in your feet that does not go away after you move your feet or change positions. ? · You have unexplained or unusual swelling of the foot or ankle. ? Watch closely for changes in your health, and be sure to contact your doctor if:  ? · You cannot do proper foot care. Where can you learn more? Go to http://eulogio-lior.info/. Enter A739 in the search box to learn more about \"Diabetes Foot Health: Care Instructions. \"  Current as of: March 13, 2017  Content Version: 11.4  © 7377-2390 [employer] Care instructions adapted under license by [employer] (which disclaims liability or warranty for this information). If you have questions about a medical condition or this instruction, always ask your healthcare professional. [address] any warranty or liability for your use of this information.

## 2018-04-25 NOTE — LETTER
4/30/2018 9:39 AM 
 
Ms. Pamela Hester [address] [place] Pamela Hester: 
 
Please find your most recent results below. Resulted Orders HEMOGLOBIN A1C WITH EAG Result Value Ref Range Hemoglobin A1c 9.9 (H) 4.8 - 5.6 % Comment:  
            Pre-diabetes: 5.7 - 6.4 Diabetes: >6.4 Glycemic control for adults with diabetes: <7.0 Estimated average glucose 237 mg/dL Narrative Performed at:  [address]  [id number] : Carina Hurd MD, Phone:  [phone] METABOLIC PANEL, COMPREHENSIVE Result Value Ref Range Glucose 291 (H) 65 - 99 mg/dL BUN 18 8 - 27 mg/dL Creatinine 0.86 0.57 - 1.00 mg/dL GFR est non-AA 74 >59 mL/min/1.73 GFR est AA 85 >59 mL/min/1.73  
 BUN/Creatinine ratio 21 12 - 28 Sodium 137 134 - 144 mmol/L Potassium 4.2 3.5 - 5.2 mmol/L Chloride 97 96 - 106 mmol/L  
 CO2 25 18 - 29 mmol/L Calcium 9.7 8.7 - 10.3 mg/dL Protein, total 7.0 6.0 - 8.5 g/dL Albumin 4.4 3.6 - 4.8 g/dL GLOBULIN, TOTAL 2.6 1.5 - 4.5 g/dL A-G Ratio 1.7 1.2 - 2.2 Bilirubin, total 0.2 0.0 - 1.2 mg/dL Alk. phosphatase 88 39 - 117 IU/L  
 AST (SGOT) 15 0 - 40 IU/L  
 ALT (SGPT) 21 0 - 32 IU/L Narrative Performed at:  [address]  [id number] : Carina Hurd MD, Phone:  [phone] RECOMMENDATIONS: 
Blood sugar remains too high.  A1C remains too high. Please use insulin as prescribed and let me see you again for lab check in 3 months.  Normal kidney and liver tests. Please call me if you have any questions: [phone] Sincerely, Cata Walden MD

## 2018-04-25 NOTE — PROGRESS NOTES
Subjective: Tracie Poole is a 59 y.o. female seen for follow up of diabetes. She also has hyperlipidemia and obesity. Diabetic Review of Systems - medication compliance: noncompliant some of the time, diabetic diet compliance: noncompliant some of the time, home glucose monitoring: is performed sporadically, last eye exam approximately 10 months ago. Other symptoms and concerns: she still has no insurance. Has tried to switch to Humulin 70/30 but still too expensive and has cut back on dose to make supply last.  She plans to apply for PAP to get Toujeo. Due for labs. Patient Active Problem List    Diagnosis Date Noted    Uncontrolled type 2 diabetes mellitus with diabetic cataract, with long-term current use of insulin ([facility] [address] 05/15/2015     Priority: 1 - One    Nuclear cataract 07/12/2016    Open angle with borderline findings and low glaucoma risk in both eyes 07/12/2016    Osteopenia 05/15/2015    Hypercholesterolemia 04/28/2013    Thyroid goiter 04/19/2013    GERD (gastroesophageal reflux disease) 06/22/2012    Iritis 12/23/2011    DDD (degenerative disc disease), cervical 12/23/2011    Vitamin D deficiency 12/23/2011    Sleep apnea 11/08/2010    Heart murmur 11/08/2010     Current Outpatient Prescriptions   Medication Sig Dispense Refill    insulin glargine (TOUJEO SOLOSTAR U-300 INSULIN) 300 unit/mL (1.5 mL) inpn 120 Units by SubCUTAneous route daily. 4.5 mL 5    losartan (COZAAR) 25 mg tablet Take 1 Tab by mouth daily. 30 Tab 5    glipiZIDE (GLUCOTROL) 10 mg tablet Take 1 Tab by mouth two (2) times a day.  60 Tab 5    pravastatin (PRAVACHOL) 40 mg tablet TAKE ONE TABLET BY MOUTH NIGHTLY 30 Tab 5    insulin NPH/insulin regular (NOVOLIN 70/30, HUMULIN 70/30) 100 unit/mL (70-30) injection 70 units twice day 40 mL 3    BD AUTOSHIELD DUO PEN NEEDLE 30 gauge x 3/16\" ndle       glucose blood VI test strips (BLOOD GLUCOSE TEST) strip Accu Check Siomara Smart View - test strips DX E11.9 Test blood sugar twice daily. 200 Strip 3    Lancets misc Accu Check Siomara Smart View Fast Click Drum. DX E11.9 Test blood sugar twice daily 200 Each 3    Blood-Glucose Meter monitoring kit ONE TOUCH ULTRA. Test once a day. .02 1 kit 0    aspirin delayed-release 81 mg tablet Take 81 mg by mouth daily.  cpap machine kit by Does Not Apply route.       cyclobenzaprine (FLEXERIL) 10 mg tablet TAKE ONE TABLET BY MOUTH NIGHTLY 90 Tab 0    omeprazole (PRILOSEC) 20 mg capsule TAKE ONE CAPSULE BY MOUTH ONCE DAILY 30 Cap 5    VITAMIN D2 50,000 unit capsule TAKE ONE CAPSULE BY MOUTH TWICE A WEEK 9 Cap 12     Allergies   Allergen Reactions    Lisinopril Cough    Penicillin G Swelling     Past Medical History:   Diagnosis Date    Arthritis     GERD (gastroesophageal reflux disease)     Heart murmur 11/8/2010    Sleep apnea 11/8/2010     Past Surgical History:   Procedure Laterality Date    HX HEENT      uvuloplasty    HX HYSTERECTOMY      HX ORTHOPAEDIC       Family History   Problem Relation Age of Onset    Hypertension Mother     Diabetes Father     Heart Disease Father     Diabetes Maternal Grandmother     Diabetes Paternal Grandmother      Social History   Substance Use Topics    Smoking status: Never Smoker    Smokeless tobacco: Never Used    Alcohol use Yes      Comment: ocassional        Lab Results  Component Value Date/Time   Hemoglobin A1c 9.9 (H) 04/25/2018 04:07 PM   Hemoglobin A1c 10.1 (H) 10/18/2017 11:39 AM   Hemoglobin A1c 8.9 (H) 01/06/2017 09:33 AM   Hemoglobin A1c, External 9.3 06/15/2017   Hemoglobin A1c, External 8.0 02/04/2016   Hemoglobin A1c, External 9.7 11/03/2015   Glucose 291 (H) 04/25/2018 04:07 PM   LDL, calculated 85 10/18/2017 11:39 AM   Creatinine 0.86 04/25/2018 04:07 PM      Lab Results  Component Value Date/Time   Cholesterol, total 157 10/18/2017 11:39 AM   HDL Cholesterol 52 10/18/2017 11:39 AM   LDL, calculated 85 10/18/2017 11:39 AM   LDL-C, External 113 11/03/2015   Triglyceride 100 10/18/2017 11:39 AM   CHOL/HDL Ratio 3.0 11/10/2010 10:47 AM     Lab Results  Component Value Date/Time   ALT (SGPT) 21 04/25/2018 04:07 PM   AST (SGOT) 15 04/25/2018 04:07 PM   Alk. phosphatase 88 04/25/2018 04:07 PM   Bilirubin, total 0.2 04/25/2018 04:07 PM   Albumin 4.4 04/25/2018 04:07 PM   Protein, total 7.0 04/25/2018 04:07 PM   PLATELET 461 91/78/8708 09:33 AM       Lab Results  Component Value Date/Time   GFR est non-AA 74 04/25/2018 04:07 PM   GFR est AA 85 04/25/2018 04:07 PM   Creatinine 0.86 04/25/2018 04:07 PM   BUN 18 04/25/2018 04:07 PM   Sodium 137 04/25/2018 04:07 PM   Potassium 4.2 04/25/2018 04:07 PM   Chloride 97 04/25/2018 04:07 PM   CO2 25 04/25/2018 04:07 PM     Lab Results  Component Value Date/Time   TSH 0.953 10/18/2017 11:39 AM   T4, Free 1.25 01/06/2017 09:33 AM         Review of Systems  Pertinent items are noted in HPI. Objective:     Visit Vitals    /78 (BP 1 Location: Left arm, BP Patient Position: Sitting)  Comment: manual    Pulse 71    Temp 97.8 °F (36.6 °C) (Oral)    Resp 20    Ht 5' 7\" (1.702 m)    Wt 210 lb (95.3 kg)    LMP 10/14/1987    SpO2 97%    BMI 32.89 kg/m2     Appearance: alert, well appearing, and in no distress and overweight. Exam: heart sounds normal rate, regular rhythm, normal S1, S2, no murmurs, rubs, clicks or gallops, chest clear  Diabetic foot exam:     Left:    Filament test normal sensation with micro filament   Pulse DP: 2+ (normal)   Pulse PT: 2+ (normal)   Deformities: Mild - bunion  Right:    Filament test normal sensation with micro filament   Pulse DP: 2+ (normal)   Pulse PT: 2+ (normal)   Deformities: None  Lab review: orders written for new lab studies as appropriate; see orders. Assessment/Plan:     diabetes poorly controlled, needs improvement, hyperlipidemia . Diabetic issues reviewed with her: annual eye examinations at Ophthalmology discussed. ICD-10-CM ICD-9-CM    1.  Uncontrolled type 2 diabetes mellitus with diabetic cataract, with long-term current use of insulin ([facility]) E11.36 250.52  DIABETES FOOT EXAM    Z79.4 366.41 insulin glargine (TOUJEO SOLOSTAR U-300 INSULIN) 300 unit/mL (1.5 mL) inpn    E11.65 V58.67 losartan (COZAAR) 25 mg tablet      glipiZIDE (GLUCOTROL) 10 mg tablet      HEMOGLOBIN A1C WITH EAG      insulin NPH/insulin regular (NOVOLIN 70/30, HUMULIN 70/30) 100 unit/mL (70-30) injection   2. Hypercholesterolemia E78.00 272.0 pravastatin (PRAVACHOL) 40 mg tablet   3. Encounter for long-term current use of medication X09.620 H45.30 METABOLIC PANEL, COMPREHENSIVE     Lab checked. I called Shaina and Vinnie Novolin 70/30 is $25 per vial.  Will switch to that while she waits to apply for PAP for Toujeo.   Remind to see Ophthalmology in the summer for annual eye exam.

## 2018-04-25 NOTE — MR AVS SNAPSHOT
[address] [place] [address] [address] [facility] 
[phone] Patient: Haroldo Martinez MRN: [id number]:[id number] Visit Information Date & Time Provider Department Dept. Phone Encounter #  
 [id number]  0:26 PM Alyssa Westbrook Yosef Tal [phone] [id number] Upcoming Health Maintenance Date Due ZOSTER VACCINE AGE 60> 1/4/2018 EYE EXAM RETINAL OR DILATED Q1 6/29/2018 MICROALBUMIN Q1 10/18/2018 LIPID PANEL Q1 10/18/2018 HEMOGLOBIN A1C Q6M 10/25/2018 BREAST CANCER SCRN MAMMOGRAM 4/11/2019 FOOT EXAM Q1 4/25/2019 PAP AKA CERVICAL CYTOLOGY 6/27/2019 COLONOSCOPY 5/15/2025 DTaP/Tdap/Td series (2 - Td) 1/6/2027 Allergies as of 4/25/2018  Review Complete On: 8/88/8250 By: Alyssa Westbrook MD  
  
 Severity Noted Reaction Type Reactions Lisinopril  02/05/2016    Cough Penicillin G  11/08/2010    Swelling Current Immunizations  Reviewed on 10/18/2017 Name Date Pneumococcal Polysaccharide (PPSV-23) 6/27/2016 Not reviewed this visit You Were Diagnosed With   
  
 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, with long-term current use of insulin ([facility]    -  Primary ICD-10-CM: E11.36, Z79.4, E11.65 ICD-9-CM: 250.52, 366.41, V58.67 Hypercholesterolemia     ICD-10-CM: E78.00 ICD-9-CM: 272.0 Encounter for long-term current use of medication     ICD-10-CM: Z79.899 ICD-9-CM: V58.69 Vitals BP Pulse Temp Resp Height(growth percentile) Weight(growth percentile) 140/78 (BP 1 Location: Left arm, BP Patient Position: Sitting) 71 97.8 °F (36.6 °C) (Oral) 20 5' 7\" (1.702 m) 210 lb (95.3 kg) LMP SpO2 BMI OB Status Smoking Status 10/14/1987 97% 32.89 kg/m2 Hysterectomy Never Smoker BMI and BSA Data Body Mass Index Body Surface Area  
 32.89 kg/m 2 2.12 m 2 Preferred Pharmacy Pharmacy Name Phone Geovanna Rivas [address] [place] [address] [place] [phone] Your Updated Medication List  
  
   
This list is accurate as of 18  4:16 PM.  Always use your most recent med list.  
  
  
  
  
 aspirin delayed-release 81 mg tablet Take 81 mg by mouth daily. BD AUTOSHIELD DUO PEN NEEDLE 30 gauge x 3/16\" Ndle Generic drug:  pen needle,diabetic dual safty Blood-Glucose Meter monitoring kit ONE TOUCH ULTRA. Test once a day. .02  
  
 cpap machine kit  
by Does Not Apply route. cyclobenzaprine 10 mg tablet Commonly known as:  FLEXERIL  
TAKE ONE TABLET BY MOUTH NIGHTLY  
  
 glipiZIDE 10 mg tablet Commonly known as:  Rulon Bread Take 1 Tab by mouth two (2) times a day. glucose blood VI test strips strip Commonly known as:  blood glucose test  
Accu Check Siomara Smart View - test strips DX E11.9 Test blood sugar twice daily. insulin glargine 300 unit/mL (1.5 mL) Inpn Commonly known as:  TOUJEO SOLOSTAR U-300 INSULIN  
120 Units by SubCUTAneous route daily. insulin NPH/insulin regular 100 unit/mL (70-30) injection Commonly known as:  NOVOLIN 70/30, HUMULIN 70/30  
70 units twice day Lancets Misc Accu Check Siomara Smart View Fast Click Drum. DX E11.9 Test blood sugar twice daily  
  
 losartan 25 mg tablet Commonly known as:  COZAAR Take 1 Tab by mouth daily. omeprazole 20 mg capsule Commonly known as:  PRILOSEC  
TAKE ONE CAPSULE BY MOUTH ONCE DAILY pravastatin 40 mg tablet Commonly known as:  PRAVACHOL  
TAKE ONE TABLET BY MOUTH NIGHTLY  
  
 VITAMIN D2 50,000 unit capsule Generic drug:  ergocalciferol TAKE ONE CAPSULE BY MOUTH TWICE A WEEK Prescriptions Printed Refills  
 insulin glargine (TOUJEO SOLOSTAR U-300 INSULIN) 300 unit/mL (1.5 mL) inpn 5 Si Units by SubCUTAneous route daily. Class: Print  Route: SubCUTAneous  
 losartan (COZAAR) 25 mg tablet 5  
 Sig: Take 1 Tab by mouth daily. Class: Print Route: Oral  
 glipiZIDE (GLUCOTROL) 10 mg tablet 5 Sig: Take 1 Tab by mouth two (2) times a day. Class: Print Route: Oral  
 pravastatin (PRAVACHOL) 40 mg tablet 5 Sig: TAKE ONE TABLET BY MOUTH NIGHTLY Class: Print  
 insulin NPH/insulin regular (NOVOLIN 70/30, HUMULIN 70/30) 100 unit/mL (70-30) injection 3 Si units twice day Class: Print We Performed the Following HEMOGLOBIN A1C WITH EAG [75396 CPT(R)]  DIABETES FOOT EXAM [7 Custom] METABOLIC PANEL, COMPREHENSIVE [73624 CPT(R)] Patient Instructions Diabetes Foot Health: Care Instructions Your Care Instructions When you have diabetes, your feet need extra care and attention. Diabetes can damage the nerve endings and blood vessels in your feet, making you less likely to notice when your feet are injured. Diabetes also limits your body's ability to fight infection and get blood to areas that need it. If you get a minor foot injury, it could become an ulcer or a serious infection. With good foot care, you can prevent most of these problems. Caring for your feet can be quick and easy. Most of the care can be done when you are bathing or getting ready for bed. Follow-up care is a key part of your treatment and safety. Be sure to make and go to all appointments, and call your doctor if you are having problems. It's also a good idea to know your test results and keep a list of the medicines you take. How can you care for yourself at home? · Keep your blood sugar close to normal by watching what and how much you eat, monitoring blood sugar, taking medicines if prescribed, and getting regular exercise. · Do not smoke. Smoking affects blood flow and can make foot problems worse. If you need help quitting, talk to your doctor about stop-smoking programs and medicines. These can increase your chances of quitting for good. · Eat a diet that is low in fats. High fat intake can cause fat to build up in your blood vessels and decrease blood flow. · Inspect your feet daily for blisters, cuts, cracks, or sores. If you cannot see well, use a mirror or have someone help you. · Take care of your feet: 
[facility] your feet every day. Use warm (not hot) water. Check the water temperature with your wrists or other part of your body, not your feet. ¨ Dry your feet well. Pat them dry. Do not rub the skin on your feet too hard. Dry well between your toes. If the skin on your feet stays moist, bacteria or a fungus can grow, which can lead to infection. ¨ Keep your skin soft. Use moisturizing skin cream to keep the skin on your feet soft and prevent calluses and cracks. But do not put the cream between your toes, and stop using any cream that causes a rash. ¨ Clean underneath your toenails carefully. Do not use a sharp object to clean underneath your toenails. Use the blunt end of a nail file or other rounded tool. ¨ Trim and file your toenails straight across to prevent ingrown toenails. Use a nail clipper, not scissors. Use an emery board to smooth the edges. · Change socks daily. Socks without seams are best, because seams often rub the feet. You can find socks for people with diabetes from specialty catalogs. · Look inside your shoes every day for things like gravel or torn linings, which could cause blisters or sores. · Buy shoes that fit well: 
¨ Look for shoes that have plenty of space around the toes. This helps prevent bunions and blisters. ¨ Try on shoes while wearing the kind of socks you will usually wear with the shoes. ¨ Avoid plastic shoes. They may rub your feet and cause blisters. Good shoes should be made of materials that are flexible and breathable, such as leather or cloth. ¨ Break in new shoes slowly by wearing them for no more than an hour a day for several days.  Take extra time to check your feet for red areas, blisters, or other problems after you wear new shoes. · Do not go barefoot. Do not wear sandals, and do not wear shoes with very thin soles. Thin soles are easy to puncture. They also do not protect your feet from hot pavement or cold weather. · Have your doctor check your feet during each visit. If you have a foot problem, see your doctor. Do not try to treat an early foot problem at home. Home remedies or treatments that you can buy without a prescription (such as corn removers) can be harmful. · Always get early treatment for foot problems. A minor irritation can lead to a major problem if not properly cared for early. When should you call for help? Call your doctor now or seek immediate medical care if: 
? · You have a foot sore, an ulcer or break in the skin that is not healing after 4 days, bleeding corns or calluses, or an ingrown toenail. ? · You have blue or black areas, which can mean bruising or blood flow problems. ? · You have peeling skin or tiny blisters between your toes or cracking or oozing of the skin. ? · You have a fever for more than 24 hours and a foot sore. ? · You have new numbness or tingling in your feet that does not go away after you move your feet or change positions. ? · You have unexplained or unusual swelling of the foot or ankle. ? Watch closely for changes in your health, and be sure to contact your doctor if: 
? · You cannot do proper foot care. Where can you learn more? Go to http://eulogio-lior.info/. Enter A739 in the search box to learn more about \"Diabetes Foot Health: Care Instructions. \" Current as of: March 13, 2017 Content Version: 11.4 © 3337-8321 [employer]. Care instructions adapted under license by [employer] (which disclaims liability or warranty for this information).  If you have questions about a medical condition or this instruction, always ask your healthcare professional. [employer] disclaims any warranty or liability for your use of this information. Introducing [facility]! Dear Darlin Gfiford: Thank you for requesting a [employer] account. Our records indicate that you already have an active [employer] account. You can access your account anytime at [email or web address] Did you know that you can access your hospital and ER discharge instructions at any time in Tree[employer]? You can also review all of your test results from your hospital stay or ER visit. Additional Information If you have questions, please visit the Frequently Asked Questions section of the [employer] website at [email or web address] Remember, [employer]o is NOT to be used for urgent needs. For medical emergencies, dial 911. Now available from your iPhone and Android! Please provide this summary of care documentation to your next provider. Your primary care clinician is listed as Nan Rodrigues. If you have any questions after today's visit, please call [phone].

## 2018-04-26 LAB
ALBUMIN SERPL-MCNC: 4.4 G/DL (ref 3.6–4.8)
ALBUMIN/GLOB SERPL: 1.7 {RATIO} (ref 1.2–2.2)
ALP SERPL-CCNC: 88 IU/L (ref 39–117)
ALT SERPL-CCNC: 21 IU/L (ref 0–32)
AST SERPL-CCNC: 15 IU/L (ref 0–40)
BILIRUB SERPL-MCNC: 0.2 MG/DL (ref 0–1.2)
BUN SERPL-MCNC: 18 MG/DL (ref 8–27)
BUN/CREAT SERPL: 21 (ref 12–28)
CALCIUM SERPL-MCNC: 9.7 MG/DL (ref 8.7–10.3)
CHLORIDE SERPL-SCNC: 97 MMOL/L (ref 96–106)
CO2 SERPL-SCNC: 25 MMOL/L (ref 18–29)
CREAT SERPL-MCNC: 0.86 MG/DL (ref 0.57–1)
EST. AVERAGE GLUCOSE BLD GHB EST-MCNC: 237 MG/DL
GFR SERPLBLD CREATININE-BSD FMLA CKD-EPI: 74 ML/MIN/1.73
GFR SERPLBLD CREATININE-BSD FMLA CKD-EPI: 85 ML/MIN/1.73
GLOBULIN SER CALC-MCNC: 2.6 G/DL (ref 1.5–4.5)
GLUCOSE SERPL-MCNC: 291 MG/DL (ref 65–99)
HBA1C MFR BLD: 9.9 % (ref 4.8–5.6)
POTASSIUM SERPL-SCNC: 4.2 MMOL/L (ref 3.5–5.2)
PROT SERPL-MCNC: 7 G/DL (ref 6–8.5)
SODIUM SERPL-SCNC: 137 MMOL/L (ref 134–144)

## 2018-04-29 NOTE — PROGRESS NOTES
Blood sugar remains too high. A1C remains too high. Please use insulin as prescribed and let me see you again for lab check in 3 months. Normal kidney and liver tests.

## 2018-05-08 ENCOUNTER — TELEPHONE (OUTPATIENT)
Dept: FAMILY MEDICINE CLINIC | Age: 58
End: 2018-05-08

## 2018-05-08 NOTE — TELEPHONE ENCOUNTER
She will work on her sugar. I told her I mailed another copy. She said she could not find it on my chart. She will look again.

## 2019-01-25 DIAGNOSIS — E78.00 HYPERCHOLESTEROLEMIA: ICD-10-CM

## 2019-01-25 RX ORDER — LOSARTAN POTASSIUM 25 MG/1
25 TABLET ORAL DAILY
Qty: 30 TAB | Refills: 0 | Status: SHIPPED | OUTPATIENT
Start: 2019-01-25 | End: 2019-04-08 | Stop reason: SDUPTHER

## 2019-01-25 RX ORDER — PRAVASTATIN SODIUM 40 MG/1
TABLET ORAL
Qty: 30 TAB | Refills: 0 | Status: SHIPPED | OUTPATIENT
Start: 2019-01-25 | End: 2019-04-08 | Stop reason: SDUPTHER

## 2019-03-10 RX ORDER — GLIPIZIDE 10 MG/1
TABLET ORAL
Qty: 60 TAB | Refills: 0 | Status: SHIPPED | OUTPATIENT
Start: 2019-03-10 | End: 2019-04-08 | Stop reason: SDUPTHER

## 2019-04-05 PROBLEM — Z79.899 ENCOUNTER FOR LONG-TERM CURRENT USE OF MEDICATION: Status: ACTIVE | Noted: 2019-04-05

## 2019-04-08 ENCOUNTER — OFFICE VISIT (OUTPATIENT)
Dept: FAMILY MEDICINE CLINIC | Age: 59
End: 2019-04-08

## 2019-04-08 VITALS
TEMPERATURE: 97.5 F | RESPIRATION RATE: 20 BRPM | HEART RATE: 66 BPM | SYSTOLIC BLOOD PRESSURE: 146 MMHG | HEIGHT: 67 IN | WEIGHT: 223.2 LBS | DIASTOLIC BLOOD PRESSURE: 78 MMHG | BODY MASS INDEX: 35.03 KG/M2 | OXYGEN SATURATION: 97 %

## 2019-04-08 DIAGNOSIS — E78.00 HYPERCHOLESTEROLEMIA: ICD-10-CM

## 2019-04-08 DIAGNOSIS — K21.9 GASTROESOPHAGEAL REFLUX DISEASE WITHOUT ESOPHAGITIS: ICD-10-CM

## 2019-04-08 DIAGNOSIS — E04.9 THYROID GOITER: ICD-10-CM

## 2019-04-08 DIAGNOSIS — Z79.899 ENCOUNTER FOR LONG-TERM CURRENT USE OF MEDICATION: ICD-10-CM

## 2019-04-08 DIAGNOSIS — K21.9 GASTROESOPHAGEAL REFLUX DISEASE, ESOPHAGITIS PRESENCE NOT SPECIFIED: ICD-10-CM

## 2019-04-08 DIAGNOSIS — E55.9 VITAMIN D DEFICIENCY: ICD-10-CM

## 2019-04-08 LAB
ALBUMIN UR QL STRIP: 30 MG/L
BILIRUB UR QL STRIP: NORMAL
CREATININE, URINE POC: 300 MG/DL
GLUCOSE UR-MCNC: NEGATIVE MG/DL
KETONES P FAST UR STRIP-MCNC: NEGATIVE MG/DL
MICROALBUMIN/CREAT RATIO POC: <30 MG/G
PH UR STRIP: 5 [PH] (ref 4.6–8)
PROT UR QL STRIP: NEGATIVE
SP GR UR STRIP: 1.03 (ref 1–1.03)
UA UROBILINOGEN AMB POC: NORMAL (ref 0.2–1)
URINALYSIS CLARITY POC: NORMAL
URINALYSIS COLOR POC: NORMAL
URINE BLOOD POC: NEGATIVE
URINE LEUKOCYTES POC: NEGATIVE
URINE NITRITES POC: NEGATIVE

## 2019-04-08 RX ORDER — PEN NEEDLE, DIABETIC, SAFETY 30 GX3/16"
NEEDLE, DISPOSABLE MISCELLANEOUS
Qty: 60 PEN NEEDLE | Refills: 5 | Status: CANCELLED | OUTPATIENT
Start: 2019-04-08

## 2019-04-08 RX ORDER — LOSARTAN POTASSIUM 25 MG/1
25 TABLET ORAL DAILY
Qty: 30 TAB | Refills: 5 | Status: SHIPPED | OUTPATIENT
Start: 2019-04-08 | End: 2019-09-09 | Stop reason: SDUPTHER

## 2019-04-08 RX ORDER — OMEPRAZOLE 20 MG/1
CAPSULE, DELAYED RELEASE ORAL
Qty: 30 CAP | Refills: 5 | Status: CANCELLED | OUTPATIENT
Start: 2019-04-08

## 2019-04-08 RX ORDER — GLIPIZIDE 10 MG/1
TABLET ORAL
Qty: 60 TAB | Refills: 5 | Status: SHIPPED | OUTPATIENT
Start: 2019-04-08 | End: 2019-11-20 | Stop reason: SDUPTHER

## 2019-04-08 RX ORDER — LANCETS
EACH MISCELLANEOUS
Qty: 200 EACH | Refills: 3 | Status: CANCELLED | OUTPATIENT
Start: 2019-04-08

## 2019-04-08 RX ORDER — PRAVASTATIN SODIUM 40 MG/1
40 TABLET ORAL
Qty: 30 TAB | Refills: 5 | Status: SHIPPED | OUTPATIENT
Start: 2019-04-08 | End: 2020-03-13

## 2019-04-08 RX ORDER — ERGOCALCIFEROL 1.25 MG/1
CAPSULE ORAL
Qty: 9 CAP | Refills: 12 | Status: CANCELLED | OUTPATIENT
Start: 2019-04-08

## 2019-04-08 NOTE — PROGRESS NOTES
Identified pt with two pt identifiers(name and ). Chief Complaint Patient presents with  Physical  
 Sore Throat  
  on and off left outside of throat  Breathing Problem  
  sometimes she just takes a deep breath  Sleep Problem  Diarrhea  Foot Pain  
  when she wakes up her toes don't feel right Health Maintenance Due Topic  Shingrix Vaccine Age 50> (1 of 2)  MICROALBUMIN Q1   
 LIPID PANEL Q1   
 HEMOGLOBIN A1C Q6M   
 BREAST CANCER SCRN MAMMOGRAM   
 FOOT EXAM Q1   
 PAP AKA CERVICAL CYTOLOGY Wt Readings from Last 3 Encounters:  
19 223 lb 3.2 oz (101.2 kg) 18 210 lb (95.3 kg) 10/18/17 204 lb 12.8 oz (92.9 kg) Temp Readings from Last 3 Encounters:  
19 97.5 °F (36.4 °C) (Oral) 18 97.8 °F (36.6 °C) (Oral) 10/18/17 97.7 °F (36.5 °C) (Oral) BP Readings from Last 3 Encounters:  
19 146/78  
18 140/78  
10/18/17 107/68 Pulse Readings from Last 3 Encounters:  
19 66  
18 71  
10/18/17 60 Learning Assessment: 
:  
 
Learning Assessment 2017 PRIMARY LEARNER Patient Patient HIGHEST LEVEL OF EDUCATION - PRIMARY LEARNER  - 2 YEARS OF COLLEGE  
BARRIERS PRIMARY LEARNER - NONE  
CO-LEARNER CAREGIVER - No  
PRIMARY LANGUAGE ENGLISH ENGLISH  
LEARNER PREFERENCE PRIMARY DEMONSTRATION DEMONSTRATION  
ANSWERED BY patient self RELATIONSHIP SELF SELF Depression Screening: 
:  
 
3 most recent PHQ Screens 2019 Little interest or pleasure in doing things Not at all Feeling down, depressed, irritable, or hopeless Not at all Total Score PHQ 2 0 Fall Risk Assessment: 
:  
 
Fall Risk Assessment, last 12 mths 2019 Able to walk? Yes Fall in past 12 months? No  
Fall with injury? -  
Number of falls in past 12 months - Fall Risk Score -  
 
 
Abuse Screening: 
:  
 
Abuse Screening Questionnaire 2019 2018 10/18/2017 2017 2016 2014 Do you ever feel afraid of your partner? N N N N N N Are you in a relationship with someone who physically or mentally threatens you? N N N N N N Is it safe for you to go home? Nallely Haji Coordination of Care Questionnaire: 
:  
 
1) Have you been to an emergency room, urgent care clinic since your last visit? no  
Hospitalized since your last visit? no          
 
2) Have you seen or consulted any other health care providers outside of [address] [place] [address] since your last visit? no  (Include any pap smears or colon screenings in this section.) 3) Do you have an Advance Directive on file? no 
Are you interested in receiving information about Advance Directives? no 
 
Reviewed record in preparation for visit and have obtained necessary documentation. Medication reconciliation up to date and corrected with patient at this time. Results for orders placed or performed in visit on 04/08/19 AMB POC URINE, MICROALBUMIN, SEMIQUANT (3 RESULTS) Result Value Ref Range ALBUMIN, URINE POC 30 Negative mg/L  
 CREATININE, URINE  mg/dL Microalbumin/creat ratio (POC) <30 <30 MG/G  
AMB POC URINALYSIS DIP STICK AUTO W/O MICRO Result Value Ref Range Color (UA POC) CIT Group Clarity (UA POC) Slightly Cloudy Glucose (UA POC) Negative Negative Bilirubin (UA POC) 1+ Negative Ketones (UA POC) Negative Negative Specific gravity (UA POC) 1.030 1.001 - 1.035 Blood (UA POC) Negative Negative pH (UA POC) 5.0 4.6 - 8.0 Protein (UA POC) Negative Negative Urobilinogen (UA POC) 0.2 mg/dL 0.2 - 1 Nitrites (UA POC) Negative Negative Leukocyte esterase (UA POC) Negative Negative

## 2019-04-08 NOTE — PATIENT INSTRUCTIONS

## 2019-04-08 NOTE — PROGRESS NOTES
Subjective: Jl Valles is a 62 y.o. female seen for follow up of diabetes. She also has hyperlipidemia and obesity. Diabetic Review of Systems - medication compliance: noncompliant some of the time, diabetic diet compliance: noncompliant some of the time, home glucose monitoring: is performed sporadically, fasting values range 300's, last eye exam approximately 2 years ago. Other symptoms and concerns: she does not have medical insurance. Tries to stretch out insulin, so only takes injection once daily. Weight is up 13 lb. Problem vision. Some diarrhea. Urine urgency. Not use CPAP due to lack of insurance. Machine was taken away. Patient Active Problem List  
 Diagnosis Date Noted  Uncontrolled type 2 diabetes mellitus with diabetic cataract, with long-term current use of insulin ([facility] [address] 05/15/2015 Priority: 1 - One  
 Encounter for long-term current use of medication 04/05/2019  Nuclear cataract 07/12/2016  Open angle with borderline findings and low glaucoma risk in both eyes 07/12/2016  Osteopenia 05/15/2015  Hypercholesterolemia 04/28/2013  Thyroid goiter 04/19/2013  GERD (gastroesophageal reflux disease) 06/22/2012  Iritis 12/23/2011  DDD (degenerative disc disease), cervical 12/23/2011  Vitamin D deficiency 12/23/2011  Sleep apnea 11/08/2010  
 Heart murmur 11/08/2010 Current Outpatient Medications Medication Sig Dispense Refill  glipiZIDE (GLUCOTROL) 10 mg tablet TAKE 1 TABLET BY MOUTH TWICE DAILY 60 Tab 5  
 insulin NPH/insulin regular (NOVOLIN 70/30 U-100 INSULIN) 100 unit/mL (70-30) injection INJECT 70 UNITS SUBCUTANEOUSLY TWICE DAILY. 40 mL 3  
 pravastatin (PRAVACHOL) 40 mg tablet Take 1 Tab by mouth nightly. 30 Tab 5  
 omeprazole (PRILOSEC) 20 mg capsule TAKE ONE CAPSULE BY MOUTH ONCE DAILY 30 Cap 5  BD AUTOSHIELD DUO PEN NEEDLE 30 gauge x 3/16\" ndle  glucose blood VI test strips (BLOOD GLUCOSE TEST) strip Accu Check Siomara Smart View - test strips DX E11.9 Test blood sugar twice daily. 200 Strip 3  Lancets misc Accu Check Siomara Smart View Fast Click Drum. DX E11.9 Test blood sugar twice daily 200 Each 3  Blood-Glucose Meter monitoring kit ONE TOUCH ULTRA. Test once a day. .02 1 kit 0  
 aspirin delayed-release 81 mg tablet Take 81 mg by mouth daily.  losartan (COZAAR) 25 mg tablet Take 1 Tab by mouth daily. 30 Tab 5  
 insulin glargine U-300 conc (TOUJEO SOLOSTAR U-300 INSULIN) 300 unit/mL (1.5 mL) inpn 120 Units by SubCUTAneous route daily. 4.5 mL 5  cyclobenzaprine (FLEXERIL) 10 mg tablet TAKE ONE TABLET BY MOUTH NIGHTLY 90 Tab 0 Allergies Allergen Reactions  Lisinopril Cough  Penicillin G Swelling Past Medical History:  
Diagnosis Date  Arthritis  GERD (gastroesophageal reflux disease)  Heart murmur 11/8/2010  Sleep apnea 11/8/2010 Past Surgical History:  
Procedure Laterality Date  HX HEENT    
 uvuloplasty  HX HYSTERECTOMY  HX ORTHOPAEDIC Family History Problem Relation Age of Onset  Hypertension Mother  Diabetes Father  Heart Disease Father  Diabetes Maternal Grandmother  Diabetes Paternal Grandmother Social History Tobacco Use  Smoking status: Never Smoker  Smokeless tobacco: Never Used Substance Use Topics  Alcohol use: Yes Comment: ocassional  
  
 
  
 
Review of Systems Pertinent items are noted in HPI. Objective:  
 
Visit Vitals /78 (BP 1 Location: Left arm, BP Patient Position: Sitting) Comment: manual  
Pulse 66 Temp 97.5 °F (36.4 °C) (Oral) Resp 20 Ht 5' 7\" (1.702 m) Wt 223 lb 3.2 oz (101.2 kg) LMP 10/14/1987 SpO2 97% BMI 34.96 kg/m² Appearance: alert, well appearing, and in no distress and overweight. Exam: ENT clear TM's, pharynx clear. Neck supple. heart sounds normal rate, regular rhythm, normal S1, S2, no murmurs, rubs, clicks or gallops, chest clear Diabetic foot exam:  
 
Left Foot: 
 Visual Exam: bunion Pulse DP: 2+ (normal) Filament test: normal sensation Right Foot: 
 Visual Exam: bunion Pulse DP: 2+ (normal) Filament test: normal sensation Lab review: orders written for new lab studies as appropriate; see orders. Assessment/Plan:  
 
diabetes poorly controlled, needs improvement, patient poorly compliant, hypertension , hyperlipidemia . Diabetic issues reviewed with her: low cholesterol diet, weight control and daily exercise discussed. ICD-10-CM ICD-9-CM 1. Uncontrolled type 2 diabetes mellitus with diabetic cataract, with long-term current use of insulin ([facility]) E11.36 250.52 HEMOGLOBIN A1C WITH EAG  
 Z79.4 366.41 AMB POC URINE, MICROALBUMIN, SEMIQUANT (3 RESULTS) E11.65 V58.67 glipiZIDE (GLUCOTROL) 10 mg tablet  
   insulin NPH/insulin regular (NOVOLIN 70/30 U-100 INSULIN) 100 unit/mL (70-30) injection  
   losartan (COZAAR) 25 mg tablet  
   insulin glargine U-300 conc (TOUJEO SOLOSTAR U-300 INSULIN) 300 unit/mL (1.5 mL) [facility]  
2. Vitamin D deficiency E55.9 268.9 VITAMIN D, 25 HYDROXY 3. Thyroid goiter E04.9 240.9 TSH 3RD GENERATION  
   T4, FREE 4. Hypercholesterolemia E78.00 272.0 LIPID PANEL  
   pravastatin (PRAVACHOL) 40 mg tablet 5. Gastroesophageal reflux disease, esophagitis presence not specified K21.9 530.81   
6. Encounter for long-term current use of medication Z79.899 V58.69 CBC WITH AUTOMATED DIFF  
   METABOLIC PANEL, COMPREHENSIVE 7. Gastroesophageal reflux disease without esophagitis K21.9 530.81 Discuss weight reduction. Importance of medicine compliance. Need to get eye exam done at [facility]. FU 6 months. She is hopeful to get medical insurance within next few months. Patient Instructions Learning About Meal Planning for Diabetes Why plan your meals? Meal planning can be a key part of managing diabetes.  Planning meals and snacks with the right balance of carbohydrate, protein, and fat can help you keep your blood sugar at the target level you set with your doctor. You don't have to eat special foods. You can eat what your family eats, including sweets once in a while. But you do have to pay attention to how often you eat and how much you eat of certain foods. You may want to work with a dietitian or a certified diabetes educator. He or she can give you tips and meal ideas and can answer your questions about meal planning. This health professional can also help you reach a healthy weight if that is one of your goals. What plan is right for you? Your dietitian or diabetes educator may suggest that you start with the plate format or carbohydrate counting. The plate format The plate format is a simple way to help you manage how you eat. You plan meals by learning how much space each food should take on a plate. Using the plate format helps you spread carbohydrate throughout the day. It can make it easier to keep your blood sugar level within your target range. It also helps you see if you're eating healthy portion sizes. To use the plate format, you put non-starchy vegetables on half your plate. Add meat or meat substitutes on one-quarter of the plate. Put a grain or starchy vegetable (such as brown rice or a potato) on the final quarter of the plate. You can add a small piece of fruit and some low-fat or fat-free milk or yogurt, depending on your carbohydrate goal for each meal. 
Here are some tips for using the plate format: · Make sure that you are not using an oversized plate. A 9-inch plate is best. Many restaurants use larger plates. · Get used to using the plate format at home. Then you can use it when you eat out. · Write down your questions about using the plate format. Talk to your doctor, a dietitian, or a diabetes educator about your concerns. Carbohydrate counting With carbohydrate counting, you plan meals based on the amount of carbohydrate in each food. Carbohydrate raises blood sugar higher and more quickly than any other nutrient. It is found in desserts, breads and cereals, and fruit. It's also found in starchy vegetables such as potatoes and corn, grains such as rice and pasta, and milk and yogurt. Spreading carbohydrate throughout the day helps keep your blood sugar levels within your target range. Your daily amount depends on several things, including your weight, how active you are, which diabetes medicines you take, and what your goals are for your blood sugar levels. A registered dietitian or diabetes educator can help you plan how much carbohydrate to include in each meal and snack. A guideline for your daily amount of carbohydrate is: · 45 to 60 grams at each meal. That's about the same as 3 to 4 carbohydrate servings. · 15 to 20 grams at each snack. That's about the same as 1 carbohydrate serving. The Nutrition Facts label on packaged foods tells you how much carbohydrate is in a serving of the food. First, look at the serving size on the food label. Is that the amount you eat in a serving? All of the nutrition information on a food label is based on that serving size. So if you eat more or less than that, you'll need to adjust the other numbers. Total carbohydrate is the next thing you need to look for on the label. If you count carbohydrate servings, one serving of carbohydrate is 15 grams. For foods that don't come with labels, such as fresh fruits and vegetables, you'll need a guide that lists carbohydrate in these foods. Ask your doctor, dietitian, or diabetes educator about books or other nutrition guides you can use. If you take insulin, you need to know how many grams of carbohydrate are in a meal. This lets you know how much rapid-acting insulin to take before you eat.  If you use an insulin pump, you get a constant rate of insulin during the day. So the pump must be programmed at meals to give you extra insulin to cover the rise in blood sugar after meals. When you know how much carbohydrate you will eat, you can take the right amount of insulin. Or, if you always use the same amount of insulin, you need to make sure that you eat the same amount of carbohydrate at meals. If you need more help to understand carbohydrate counting and food labels, ask your doctor, dietitian, or diabetes educator. How do you get started with meal planning? Here are some tips to get started: 
· Plan your meals a week at a time. Don't forget to include snacks too. · Use cookbooks or online recipes to plan several main meals. Plan some quick meals for busy nights. You also can double some recipes that freeze well. Then you can save half for other busy nights when you don't have time to cook. · Make sure you have the ingredients you need for your recipes. If you're running low on basic items, put these items on your shopping list too. · List foods that you use to make breakfasts, lunches, and snacks. List plenty of fruits and vegetables. · Post this list on the refrigerator. Add to it as you think of more things you need. · Take the list to the store to do your weekly shopping. Follow-up care is a key part of your treatment and safety. Be sure to make and go to all appointments, and call your doctor if you are having problems. It's also a good idea to know your test results and keep a list of the medicines you take. Where can you learn more? Go to http://eulogio-lior.info/. Ritu Ferrera in the search box to learn more about \"Learning About Meal Planning for Diabetes. \" Current as of: July 25, 2018 Content Version: 11.9 © 2423-7266 [employer]. Care instructions adapted under license by [employer] (which disclaims liability or warranty for this information).  If you have questions about a medical condition or this instruction, always ask your healthcare professional. [place] [address] any warranty or liability for your use of this information.

## 2019-04-09 LAB
25(OH)D3+25(OH)D2 SERPL-MCNC: 18.8 NG/ML (ref 30–100)
ALBUMIN SERPL-MCNC: 4.3 G/DL (ref 3.6–4.8)
ALBUMIN/GLOB SERPL: 1.8 {RATIO} (ref 1.2–2.2)
ALP SERPL-CCNC: 78 IU/L (ref 39–117)
ALT SERPL-CCNC: 22 IU/L (ref 0–32)
AST SERPL-CCNC: 18 IU/L (ref 0–40)
BASOPHILS # BLD AUTO: 0 X10E3/UL (ref 0–0.2)
BASOPHILS NFR BLD AUTO: 0 %
BILIRUB SERPL-MCNC: 0.5 MG/DL (ref 0–1.2)
BUN SERPL-MCNC: 16 MG/DL (ref 8–27)
BUN/CREAT SERPL: 21 (ref 12–28)
CALCIUM SERPL-MCNC: 9.2 MG/DL (ref 8.7–10.3)
CHLORIDE SERPL-SCNC: 103 MMOL/L (ref 96–106)
CHOLEST SERPL-MCNC: 176 MG/DL (ref 100–199)
CO2 SERPL-SCNC: 22 MMOL/L (ref 20–29)
CREAT SERPL-MCNC: 0.78 MG/DL (ref 0.57–1)
EOSINOPHIL # BLD AUTO: 0.1 X10E3/UL (ref 0–0.4)
EOSINOPHIL NFR BLD AUTO: 2 %
ERYTHROCYTE [DISTWIDTH] IN BLOOD BY AUTOMATED COUNT: 13.7 % (ref 12.3–15.4)
EST. AVERAGE GLUCOSE BLD GHB EST-MCNC: 203 MG/DL
GLOBULIN SER CALC-MCNC: 2.4 G/DL (ref 1.5–4.5)
GLUCOSE SERPL-MCNC: 172 MG/DL (ref 65–99)
HBA1C MFR BLD: 8.7 % (ref 4.8–5.6)
HCT VFR BLD AUTO: 39.3 % (ref 34–46.6)
HDLC SERPL-MCNC: 49 MG/DL
HGB BLD-MCNC: 12.8 G/DL (ref 11.1–15.9)
IMM GRANULOCYTES # BLD AUTO: 0 X10E3/UL (ref 0–0.1)
IMM GRANULOCYTES NFR BLD AUTO: 0 %
INTERPRETATION, 910389: NORMAL
LDLC SERPL CALC-MCNC: 104 MG/DL (ref 0–99)
LYMPHOCYTES # BLD AUTO: 1.8 X10E3/UL (ref 0.7–3.1)
LYMPHOCYTES NFR BLD AUTO: 39 %
Lab: NORMAL
MCH RBC QN AUTO: 27.5 PG (ref 26.6–33)
MCHC RBC AUTO-ENTMCNC: 32.6 G/DL (ref 31.5–35.7)
MCV RBC AUTO: 84 FL (ref 79–97)
MONOCYTES # BLD AUTO: 0.3 X10E3/UL (ref 0.1–0.9)
MONOCYTES NFR BLD AUTO: 7 %
NEUTROPHILS # BLD AUTO: 2.3 X10E3/UL (ref 1.4–7)
NEUTROPHILS NFR BLD AUTO: 52 %
PLATELET # BLD AUTO: 298 X10E3/UL (ref 150–379)
POTASSIUM SERPL-SCNC: 4.4 MMOL/L (ref 3.5–5.2)
PROT SERPL-MCNC: 6.7 G/DL (ref 6–8.5)
RBC # BLD AUTO: 4.66 X10E6/UL (ref 3.77–5.28)
SODIUM SERPL-SCNC: 141 MMOL/L (ref 134–144)
T4 FREE SERPL-MCNC: 1.13 NG/DL (ref 0.82–1.77)
TRIGL SERPL-MCNC: 113 MG/DL (ref 0–149)
TSH SERPL DL<=0.005 MIU/L-ACNC: 1.35 UIU/ML (ref 0.45–4.5)
VLDLC SERPL CALC-MCNC: 23 MG/DL (ref 5–40)
WBC # BLD AUTO: 4.5 X10E3/UL (ref 3.4–10.8)

## 2019-06-19 ENCOUNTER — TELEPHONE (OUTPATIENT)
Dept: FAMILY MEDICINE CLINIC | Age: 59
End: 2019-06-19

## 2019-06-19 NOTE — TELEPHONE ENCOUNTER
----- Message from Randall Uribe sent at 6/19/2019  8:18 AM EDT -----  Regarding: Dr. Lockie Epley  PT requesting to speak to nurse about mix up with her medications. Please call PT before resubmitting meds to pharmacy.  Best contact [phone]

## 2019-06-19 NOTE — TELEPHONE ENCOUNTER
I spoke with pt. She needs new RX for [employer]. She is taking 140 units a day. I called pharmacist at [facility]. Pt will need 14 pens a month. Pt reminded to FU in July for repeat labs. She has insurance now.

## 2019-06-21 RX ORDER — ERGOCALCIFEROL 1.25 MG/1
CAPSULE ORAL
Qty: 9 CAP | Refills: 12 | Status: SHIPPED | OUTPATIENT
Start: 2019-06-21 | End: 2020-11-08

## 2019-07-15 DIAGNOSIS — M43.6 NECK STIFFNESS: ICD-10-CM

## 2019-07-15 RX ORDER — CYCLOBENZAPRINE HCL 10 MG
TABLET ORAL
Qty: 90 TAB | Refills: 0 | Status: SHIPPED | OUTPATIENT
Start: 2019-07-15 | End: 2021-04-16 | Stop reason: ALTCHOICE

## 2019-07-17 ENCOUNTER — OFFICE VISIT (OUTPATIENT)
Dept: FAMILY MEDICINE CLINIC | Age: 59
End: 2019-07-17

## 2019-07-17 ENCOUNTER — HOSPITAL ENCOUNTER (EMERGENCY)
Age: 59
Discharge: HOME | End: 2019-07-17
Attending: EMERGENCY MEDICINE
Payer: COMMERCIAL

## 2019-07-17 VITALS
HEART RATE: 95 BPM | TEMPERATURE: 97.7 F | RESPIRATION RATE: 16 BRPM | OXYGEN SATURATION: 99 % | SYSTOLIC BLOOD PRESSURE: 133 MMHG | DIASTOLIC BLOOD PRESSURE: 80 MMHG

## 2019-07-17 VITALS
OXYGEN SATURATION: 94 % | RESPIRATION RATE: 18 BRPM | DIASTOLIC BLOOD PRESSURE: 61 MMHG | WEIGHT: 213.4 LBS | HEIGHT: 67 IN | HEART RATE: 86 BPM | BODY MASS INDEX: 33.49 KG/M2 | TEMPERATURE: 98.4 F | SYSTOLIC BLOOD PRESSURE: 134 MMHG

## 2019-07-17 DIAGNOSIS — R73.09 ELEVATED GLUCOSE: ICD-10-CM

## 2019-07-17 DIAGNOSIS — R35.0 URINARY FREQUENCY: ICD-10-CM

## 2019-07-17 DIAGNOSIS — R06.02 SHORTNESS OF BREATH: ICD-10-CM

## 2019-07-17 DIAGNOSIS — M25.512 ACUTE PAIN OF LEFT SHOULDER: Primary | ICD-10-CM

## 2019-07-17 DIAGNOSIS — R73.9 HYPERGLYCEMIA: Primary | ICD-10-CM

## 2019-07-17 LAB
ALBUMIN SERPL-MCNC: 3.7 G/DL (ref 3.5–5)
ALBUMIN/GLOB SERPL: 1.1 {RATIO} (ref 1.1–2.2)
ALP SERPL-CCNC: 132 U/L (ref 45–117)
ALT SERPL-CCNC: 34 U/L (ref 12–78)
ANION GAP SERPL CALC-SCNC: 9 MMOL/L (ref 5–15)
APPEARANCE UR: CLEAR
ARTERIAL PATENCY WRIST A: ABNORMAL
AST SERPL-CCNC: 11 U/L (ref 15–37)
BACTERIA URNS QL MICRO: NEGATIVE /HPF
BASE EXCESS BLDV CALC-SCNC: 2 MMOL/L
BASOPHILS # BLD: 0 K/UL (ref 0–0.1)
BASOPHILS NFR BLD: 1 % (ref 0–1)
BDY SITE: ABNORMAL
BILIRUB SERPL-MCNC: 0.3 MG/DL (ref 0.2–1)
BILIRUB UR QL STRIP: NEGATIVE
BILIRUB UR QL: NEGATIVE
BUN SERPL-MCNC: 12 MG/DL (ref 6–20)
BUN/CREAT SERPL: 10 (ref 12–20)
CALCIUM SERPL-MCNC: 9.3 MG/DL (ref 8.5–10.1)
CHLORIDE SERPL-SCNC: 99 MMOL/L (ref 97–108)
CO2 SERPL-SCNC: 25 MMOL/L (ref 21–32)
COLOR UR: ABNORMAL
COMMENT, HOLDF: NORMAL
CREAT SERPL-MCNC: 1.23 MG/DL (ref 0.55–1.02)
DIFFERENTIAL METHOD BLD: NORMAL
EOSINOPHIL # BLD: 0.1 K/UL (ref 0–0.4)
EOSINOPHIL NFR BLD: 2 % (ref 0–7)
EPITH CASTS URNS QL MICRO: ABNORMAL /LPF
ERYTHROCYTE [DISTWIDTH] IN BLOOD BY AUTOMATED COUNT: 12.3 % (ref 11.5–14.5)
GAS FLOW.O2 O2 DELIVERY SYS: ABNORMAL L/MIN
GLOBULIN SER CALC-MCNC: 3.3 G/DL (ref 2–4)
GLUCOSE BLD STRIP.AUTO-MCNC: 326 MG/DL (ref 65–100)
GLUCOSE BLD STRIP.AUTO-MCNC: 355 MG/DL (ref 65–100)
GLUCOSE BLD STRIP.AUTO-MCNC: 422 MG/DL (ref 65–100)
GLUCOSE POC: NORMAL MG/DL
GLUCOSE SERPL-MCNC: 474 MG/DL (ref 65–100)
GLUCOSE UR STRIP.AUTO-MCNC: >1000 MG/DL
GLUCOSE UR-MCNC: ABNORMAL MG/DL
HCO3 BLDV-SCNC: 25.9 MMOL/L (ref 23–28)
HCT VFR BLD AUTO: 41.9 % (ref 35–47)
HGB BLD-MCNC: 13.7 G/DL (ref 11.5–16)
HGB UR QL STRIP: NEGATIVE
IMM GRANULOCYTES # BLD AUTO: 0 K/UL (ref 0–0.04)
IMM GRANULOCYTES NFR BLD AUTO: 0 % (ref 0–0.5)
KETONES P FAST UR STRIP-MCNC: NEGATIVE MG/DL
KETONES UR QL STRIP.AUTO: NEGATIVE MG/DL
LEUKOCYTE ESTERASE UR QL STRIP.AUTO: NEGATIVE
LYMPHOCYTES # BLD: 1.9 K/UL (ref 0.8–3.5)
LYMPHOCYTES NFR BLD: 31 % (ref 12–49)
MCH RBC QN AUTO: 27.4 PG (ref 26–34)
MCHC RBC AUTO-ENTMCNC: 32.7 G/DL (ref 30–36.5)
MCV RBC AUTO: 83.8 FL (ref 80–99)
MONOCYTES # BLD: 0.5 K/UL (ref 0–1)
MONOCYTES NFR BLD: 7 % (ref 5–13)
NEUTS SEG # BLD: 3.7 K/UL (ref 1.8–8)
NEUTS SEG NFR BLD: 59 % (ref 32–75)
NITRITE UR QL STRIP.AUTO: NEGATIVE
NRBC # BLD: 0 K/UL (ref 0–0.01)
NRBC BLD-RTO: 0 PER 100 WBC
O2/TOTAL GAS SETTING VFR VENT: 21 %
PCO2 BLDV: 39.3 MMHG (ref 41–51)
PH BLDV: 7.43 [PH] (ref 7.32–7.42)
PH UR STRIP: 5 [PH] (ref 4.6–8)
PH UR STRIP: 5 [PH] (ref 5–8)
PLATELET # BLD AUTO: 291 K/UL (ref 150–400)
PMV BLD AUTO: 11.4 FL (ref 8.9–12.9)
PO2 BLDV: 43 MMHG (ref 25–40)
POTASSIUM SERPL-SCNC: 4.2 MMOL/L (ref 3.5–5.1)
PROT SERPL-MCNC: 7 G/DL (ref 6.4–8.2)
PROT UR QL STRIP: NEGATIVE
PROT UR STRIP-MCNC: NEGATIVE MG/DL
RBC # BLD AUTO: 5 M/UL (ref 3.8–5.2)
RBC #/AREA URNS HPF: ABNORMAL /HPF (ref 0–5)
SAMPLES BEING HELD,HOLD: NORMAL
SAO2 % BLDV: 80 % (ref 65–88)
SERVICE CMNT-IMP: ABNORMAL
SODIUM SERPL-SCNC: 133 MMOL/L (ref 136–145)
SP GR UR REFRACTOMETRY: 1.03 (ref 1–1.03)
SP GR UR STRIP: 5 (ref 1–1.03)
SPECIMEN TYPE: ABNORMAL
TOTAL RESP. RATE, ITRR: 16
UA UROBILINOGEN AMB POC: ABNORMAL (ref 0.2–1)
UR CULT HOLD, URHOLD: NORMAL
URINALYSIS CLARITY POC: CLEAR
URINALYSIS COLOR POC: YELLOW
URINE BLOOD POC: NEGATIVE
URINE LEUKOCYTES POC: NEGATIVE
URINE NITRITES POC: NEGATIVE
UROBILINOGEN UR QL STRIP.AUTO: 0.2 EU/DL (ref 0.2–1)
WBC # BLD AUTO: 6.2 K/UL (ref 3.6–11)
WBC URNS QL MICRO: ABNORMAL /HPF (ref 0–4)

## 2019-07-17 PROCEDURE — 96374 THER/PROPH/DIAG INJ IV PUSH: CPT

## 2019-07-17 PROCEDURE — 36415 COLL VENOUS BLD VENIPUNCTURE: CPT

## 2019-07-17 PROCEDURE — 82803 BLOOD GASES ANY COMBINATION: CPT

## 2019-07-17 PROCEDURE — 96361 HYDRATE IV INFUSION ADD-ON: CPT

## 2019-07-17 PROCEDURE — 74011250636 HC RX REV CODE- 250/636: Performed by: NURSE PRACTITIONER

## 2019-07-17 PROCEDURE — 85025 COMPLETE CBC W/AUTO DIFF WBC: CPT

## 2019-07-17 PROCEDURE — 81001 URINALYSIS AUTO W/SCOPE: CPT

## 2019-07-17 PROCEDURE — 82962 GLUCOSE BLOOD TEST: CPT

## 2019-07-17 PROCEDURE — 80053 COMPREHEN METABOLIC PANEL: CPT

## 2019-07-17 PROCEDURE — 74011636637 HC RX REV CODE- 636/637: Performed by: NURSE PRACTITIONER

## 2019-07-17 PROCEDURE — 99285 EMERGENCY DEPT VISIT HI MDM: CPT

## 2019-07-17 RX ADMIN — SODIUM CHLORIDE 1000 ML: 900 INJECTION, SOLUTION INTRAVENOUS at 18:10

## 2019-07-17 RX ADMIN — HUMAN INSULIN 4 UNITS: 100 INJECTION, SOLUTION SUBCUTANEOUS at 20:22

## 2019-07-17 RX ADMIN — SODIUM CHLORIDE 1000 ML: 900 INJECTION, SOLUTION INTRAVENOUS at 19:41

## 2019-07-17 NOTE — PROGRESS NOTES
HISTORY OF PRESENT ILLNESS  Sebas Fernandez is a 62 y.o. female. HPI   Pt presents with \"elevated blood sugar and left shoulder pain\"  This morning, her blood sugar was 527. She states that she has been taking her Toujeo and Glipizide as prescribed, no missed doses. Pt states that she does feel fatigued, feels like she could fall asleep throughout the day. She notes urinary frequency, and no pain or blood in urine. No fever  No cold symptoms  She also has some left shoulder pain, that has been going on for some time  Pain is constant, and movement does not make pain worse  She denies chest pain, but did have shortness of breath this morning  Review of Systems   Constitutional: Positive for malaise/fatigue. Negative for fever. Respiratory: Positive for shortness of breath. Musculoskeletal: Positive for joint pain. Physical Exam   Constitutional: She is oriented to person, place, and time. She appears well-developed and well-nourished. HENT:   Head: Normocephalic and atraumatic. Cardiovascular: Normal rate, regular rhythm and normal heart sounds. Pulmonary/Chest: Effort normal and breath sounds normal.   Musculoskeletal:        Left shoulder: She exhibits normal range of motion, no tenderness and no bony tenderness. Neurological: She is alert and oriented to person, place, and time. Skin: Skin is warm and dry. Psychiatric: She has a normal mood and affect. Her behavior is normal.       ASSESSMENT and PLAN    ICD-10-CM ICD-9-CM    1. Acute pain of left shoulder M25.512 719.41 AMB POC EKG ROUTINE W/ 12 LEADS, INTER & REP   2. Shortness of breath R06.02 786.05 AMB POC EKG ROUTINE W/ 12 LEADS, INTER & REP   3. Urinary frequency R35.0 788.41 AMB POC GLUCOSE, QUANTITATIVE, BLOOD      CULTURE, URINE   4.  Elevated glucose R73.09 790.29 AMB POC URINALYSIS DIP STICK AUTO W/O MICRO      AMB POC GLUCOSE, QUANTITATIVE, BLOOD      CBC W/O DIFF      METABOLIC PANEL, COMPREHENSIVE      HEMOGLOBIN A1C WITH EAG     As glucose was too high to read, even after insulin administration, will send to ER  Educated that we can call EMS transport for patient, but she declines and states that she will drive herself. Educated about going straight to ER for assessment. Pt informed to return to office with worsening of symptoms, or PRN with any questions or concerns. Pt verbalizes understanding of plan of care and denies further questions or concerns at this time.

## 2019-07-17 NOTE — ED TRIAGE NOTES
Pt presents to ED with high blood glucose. Pt was seen by pcp after finding sugar to be over 500. When pcp checked the meter was unable to read it. Pt states she has urgency and frequency urinating.  Pt also reports severe thirst.

## 2019-07-17 NOTE — ED PROVIDER NOTES
Initial Complaint: Sent by PCP for elevated BG. BG was 527 this morning. Uses insulin for last 2-3 years. Started: wasn't feeling well this am and checked BG. Had to switch to regular insulin from [facility] when she lost her insurance. Was able to resume the Toujeo in the last few months. Endorses: polyuria, polydipsia, SOB and fatigue  Denies: F/C, N/V, CP    Made better: nothing  Made worse: nothing    No further complaints. Past Medical History:  No date: Arthritis  No date: GERD (gastroesophageal reflux disease)  11/8/2010: Heart murmur  11/8/2010: Sleep apnea  Past Surgical History:  No date: HX HEENT      Comment:  uvuloplasty  No date: HX HYSTERECTOMY  No date: HX ORTHOPAEDIC  Reviewed      Primary care provider: Timmy Fairbanks MD      The history is provided by the patient. No  was used.       Past Medical History:   Diagnosis Date    Arthritis     GERD (gastroesophageal reflux disease)     Heart murmur 11/8/2010    Sleep apnea 11/8/2010     Past Surgical History:   Procedure Laterality Date    HX HEENT      uvuloplasty    HX HYSTERECTOMY      HX ORTHOPAEDIC       Family History:   Problem Relation Age of Onset    Hypertension Mother     Diabetes Father     Heart Disease Father     Diabetes Maternal Grandmother     Diabetes Paternal Grandmother      Social History     Socioeconomic History    Marital status:      Spouse name: Not on file    Number of children: Not on file    Years of education: Not on file    Highest education level: Not on file   Occupational History    Not on file   Social Needs    Financial resource strain: Not on file    Food insecurity:     Worry: Not on file     Inability: Not on file    Transportation needs:     Medical: Not on file     Non-medical: Not on file   Tobacco Use    Smoking status: Never Smoker    Smokeless tobacco: Never Used   Substance and Sexual Activity    Alcohol use: Yes     Comment: ocassional    Drug use: No    Sexual activity: Yes     Partners: Male   Lifestyle    Physical activity:     Days per week: Not on file     Minutes per session: Not on file    Stress: Not on file   Relationships    Social connections:     Talks on phone: Not on file     Gets together: Not on file     Attends Nondenominational service: Not on file     Active member of club or organization: Not on file     Attends meetings of clubs or organizations: Not on file     Relationship status: Not on file    Intimate partner violence:     Fear of current or ex partner: Not on file     Emotionally abused: Not on file     Physically abused: Not on file     Forced sexual activity: Not on file   Other Topics Concern    Not on file   Social History Narrative    Not on file     ALLERGIES: Lisinopril and Penicillin g    Review of Systems   Constitutional: Positive for fatigue. Negative for chills and fever. Endocrine: Positive for polydipsia and polyuria. Negative for polyphagia. Musculoskeletal: Positive for arthralgias and myalgias. Psychiatric/Behavioral: Negative. All other systems reviewed and are negative. Vitals:    07/17/19 1716 07/17/19 1723   BP:  (!) 163/94   Pulse: 95 85   Resp:  16   SpO2: 97% 99%          Physical Exam   Constitutional: She is oriented to person, place, and time. She appears well-developed and well-nourished. She is active and cooperative. Non-toxic appearance. She does not have a sickly appearance. She does not appear ill. No distress. 62year old female with an elevated BMI in NAD. HENT:   Head: Normocephalic and atraumatic. Neck: Normal range of motion. Neck supple. Cardiovascular: Normal rate, regular rhythm, normal heart sounds and intact distal pulses. Pulmonary/Chest: Effort normal and breath sounds normal. No respiratory distress. She has no wheezes. She has no rales. She exhibits no tenderness. Abdominal: Soft. Bowel sounds are normal. There is no tenderness. There is no guarding. Musculoskeletal: Normal range of motion. Neurological: She is alert and oriented to person, place, and time. Skin: Skin is warm and dry. No erythema. Psychiatric: She has a normal mood and affect. Her behavior is normal. Judgment and thought content normal.   Nursing note and vitals reviewed. [facility]       Procedures    Assessment & Plan:     Orders Placed This Encounter    URINE CULTURE HOLD SAMPLE    CBC WITH AUTOMATED DIFF    METABOLIC PANEL, COMPREHENSIVE    Hold Sample    URINALYSIS W/MICROSCOPIC    VENOUS BLOOD GAS    POC GLUCOSE    sodium chloride 0.9 % bolus infusion 1,000 mL       Discussed with John Rodriguez MD,ED Provider    Geovanna Alcocer NP  07/17/19  5:26 PM    Second bolus and IV Regular insulin now. Geovanna Alcocer NP  07/17/19  7:44 PM    BG after 2nd liter and 4 units IV regular insulin is 322. OK to Dc home. Discussed closed home glucose monitoring and PCP follow-up. Possibility needed Endocrine follow-up. If she can afford a new monitor that allows repeated blood glucose monitoring without finger sticks, this may increase her BG monitoring compliance. 9:07 PM  Patient re-evaluated. All questions answered. Patient appropriate for discharge. Given return precautions and follow up instructions. LABORATORY TESTS:  Labs Reviewed   METABOLIC PANEL, COMPREHENSIVE - Abnormal; Notable for the following components:       Result Value    Sodium 133 (*)     Glucose 474 (*)     Creatinine 1.23 (*)     BUN/Creatinine ratio 10 (*)     GFR est AA 54 (*)     GFR est non-AA 44 (*)     AST (SGOT) 11 (*)     Alk.  phosphatase 132 (*)     All other components within normal limits   URINALYSIS W/MICROSCOPIC - Abnormal; Notable for the following components:    Glucose >1,000 (*)     All other components within normal limits   GLUCOSE, POC - Abnormal; Notable for the following components:    Glucose (POC) 422 (*)     All other components within normal limits   POC VENOUS BLOOD GAS - Abnormal; Notable for the following components:    pH, venous (POC) 7.428 (*)     pCO2, venous (POC) 39.3 (*)     pO2, venous (POC) 43 (*)     All other components within normal limits   GLUCOSE, POC - Abnormal; Notable for the following components:    Glucose (POC) 355 (*)     All other components within normal limits   GLUCOSE, POC - Abnormal; Notable for the following components:    Glucose (POC) 326 (*)     All other components within normal limits   URINE CULTURE HOLD SAMPLE   CBC WITH AUTOMATED DIFF   SAMPLES BEING HELD   VENOUS BLOOD GAS       IMAGING RESULTS:  No results found. MEDICATIONS GIVEN:  Medications   sodium chloride 0.9 % bolus infusion 1,000 mL (1,000 mL IntraVENous New Bag 7/17/19 1810)   sodium chloride 0.9 % bolus infusion 1,000 mL (1,000 mL IntraVENous New Bag 7/17/19 1941)   insulin regular (NOVOLIN R, HUMULIN R) injection 4 Units (4 Units IntraVENous Given 7/17/19 2022)       IMPRESSION:  1. Hyperglycemia        PLAN:  1. Current Discharge Medication List        2. Follow-up Information     Follow up With Specialties Details Why Contact Info    Meghann Flores MD Family Practice Schedule an appointment as soon as possible for a visit  [address] [place] [address] Drive [address]      [address] [facility] Emergency Medicine  As needed, If symptoms worsen [address] [facility]  [phone]        3.      Return to ED for new or worsening symptoms       Yaneth Cintron NP

## 2019-07-17 NOTE — PROGRESS NOTES
Rya Larry is a 62 y.o. female    Patient states she that there is left over insulin (at tip of needle) (toujeo ) after she administer her own insulin. Patient states there is also insulin dripping from injection site after administration of insulin. Patient states she may not be receiving the completed amount of toujeo prescribed    Chief Complaint   Patient presents with    High Blood Sugar     Patient states she check her blood sugar this morning and blood sugar 521-527 . Patient staes she has been feeling bad, urine frequent     Shoulder Pain       1. Have you been to the ER, urgent care clinic since your last visit? Hospitalized since your last visit? No  M  2. Have you seen or consulted any other health care providers outside of the [address] [place] [address] since your last visit? Include any pap smears or colon screening. No      Visit Vitals  /61 (BP 1 Location: Right arm, BP Patient Position: Sitting)   Pulse 86   Temp 98.4 °F (36.9 °C) (Oral)   Resp 18   Ht 5' 7\" (1.702 m)   Wt 213 lb 6.4 oz (96.8 kg)   SpO2 94%   BMI 33.42 kg/m²           Health Maintenance Due   Topic Date Due    Shingrix Vaccine Age 49> (1 of 2) 03/04/2008    BREAST CANCER SCRN MAMMOGRAM  04/11/2019    PAP AKA CERVICAL CYTOLOGY  06/27/2019    EYE EXAM RETINAL OR DILATED  06/29/2019         Medication Reconciliation completed, changes noted.   Please  Update medication list.

## 2019-07-18 NOTE — DISCHARGE INSTRUCTIONS
Thank you for allowing us to care for you today. Please follow-up with your Primary Care provider in the next 2-3 days if your symptoms do not improve. Plan for home:     Monitor your blood sugar at home and keep a log. You should check your blood sugar 3-4 times daily while you are adjusting your blood sugars. There are blood glucose monitoring devices that stay in place for several days at a time and use a smart phone or the meter so you can check your blood sugars  without having to finger stick so often. Close follow-up with Primary care and possibly endocrinology to work on better blood sugar control. Avoid all NSAIDS. This includes: Advil, Aleve, Motrin, Voltaren, Diclofenac, Etodolac, Fenoprofen, Flurbiprofen, Ibuprofen, Indomethacin, Ketoprofen, Ketorolac, Meclofenamate, Mefenamic acid, Naproxen, Naproxen Sodium, Oxaprozin, Piroxicam, Sulindac, Tiaprofenic acid and Tolmetin until your kidney function improves. Use tylenol for pain. You may take 2 extra strength tylenol eery 6-8 hours as needed. Come back to the ER if you have worsening symptoms, fevers over 100.9, shaking chills, nausea or vomiting. Patient Education        Home Blood Glucose Test: About This Test  What is it? A home blood glucose test measures the amount of a type of sugar, called glucose, in your blood. Why is this test done? People who have diabetes need to check the amount of glucose in their blood. A home blood glucose test is an easy way to test your blood at home or when you are away from home. The results help you know when to take action to keep your blood glucose levels in a target range. How can you prepare for the test?  · Check the expiration date on the bottle of testing strips. Do not use test strips that have . · Match the code number on the testing strips bottle with the number on the meter.  If the numbers do not match, follow the directions with the meter for changing the code number. What happens before the test?  The supplies you will need for testing blood glucose include:  · A blood glucose meter. · Testing strips. These are made to be used with a specific model of meter. · Sugar control solutions. Some meters require a specific solution. Many new meters are made to operate without a control solution. · Short needles called lancets for pricking your skin. · A pen-sized marshall for the lancet (lancet device), which positions the lancet and controls how deeply it goes into your skin. · Clean cotton balls. These are used to stop the bleeding from the testing site. What happens during the test?  A home blood glucose test involves pricking your finger, palm, or forearm with a lancet to collect a drop of blood. The blood drop is placed on a test strip, which you insert into the blood glucose meter. The instructions for testing are slightly different for each blood glucose meter model. Follow the instructions that came with your meter. · Wash your hands with warm, soapy water. Dry them well with a clean towel. You may also use an alcohol wipe to clean your finger or other site, but make sure your hands are dry before the test.  · Insert a clean lancet into the lancet device. · Remove a test strip from the test strip bottle. Replace the lid immediately to keep moisture away from the other strips. · Follow the instructions that came with your meter to get it ready. · Use the lancet device to stick the side of your fingertip with the lancet. Do not stick the tip of your finger. Some blood sugar meters use lancet devices that take the blood sample from other sites, such as the palm of the hand or the forearm. But the finger is usually the most accurate place to test blood sugar. · Put a drop of blood on the correct spot on the test strip. · Apply pressure with a clean cotton ball to stop the bleeding. · Follow the directions that came with the meter to get the results.   · Write down the results and the time that you tested your blood. Some meters will store the results for you. What else should you know about the test?  The American Diabetes Association (ADA) recommends that you stay within the following blood glucose level ranges. But depending on your health, you and your doctor may set a different range for you. For nonpregnant adults with diabetes:  · 80 milligrams per deciliter (mg/dL) to 130 mg/dL before a meal  · Less than 180 mg/dL 1 to 2 hours after a meal  For women who have diabetes related to pregnancy (gestational diabetes):  · 95 mg/dL or less before breakfast  · 120 to 140 mg/dL (or lower) 1 to 2 hours after a meal  How long does the test take? · The blood glucose meter will show the results of the test in a minute or less. Where can you learn more? Go to http://eulogio-lior.info/. Enter H945 in the search box to learn more about \"Home Blood Glucose Test: About This Test.\"  Current as of: July 25, 2018  Content Version: 11.9  © 5371-9049 [employer]. Care instructions adapted under license by [employer] (which disclaims liability or warranty for this information). If you have questions about a medical condition or this instruction, always ask your healthcare professional. [address] any warranty or liability for your use of this information. Patient Education        Learning About High Blood Sugar  What is high blood sugar? Your body turns the food you eat into glucose (sugar), which it uses for energy. But if your body isn't able to use the sugar right away, it can build up in your blood and lead to high blood sugar. When the amount of sugar in your blood stays too high for too much of the time, you may have diabetes. Diabetes is a disease that can cause serious health problems.   The good news is that lifestyle changes may help you get your blood sugar back to normal and avoid or delay diabetes. What causes high blood sugar? Sugar (glucose) can build up in your blood if you:  · Are overweight. · Have a family history of diabetes. · Take certain medicines, such as steroids. What are the symptoms? Having high blood sugar may not cause any symptoms at all. Or it may make you feel very thirsty or very hungry. You may also urinate more often than usual, have blurry vision, or lose weight without trying. How is high blood sugar treated? You can take steps to lower your blood sugar level if you understand what makes it get higher. Your doctor may want you to learn how to test your blood sugar level at home. Then you can see how illness, stress, or different kinds of food or medicine raise or lower your blood sugar level. Other tests may be needed to see if you have diabetes. How can you prevent high blood sugar? · Watch your weight. If you're overweight, losing just a small amount of weight may help. Reducing fat around your waist is most important. · Limit the amount of calories, sweets, and unhealthy fat you eat. Ask your doctor if a dietitian can help you. A registered dietitian can help you create meal plans that fit your lifestyle. · Get at least 30 minutes of exercise on most days of the week. Exercise helps control your blood sugar. It also helps you maintain a healthy weight. Walking is a good choice. You also may want to do other activities, such as running, swimming, cycling, or playing tennis or team sports. · If your doctor prescribed medicines, take them exactly as prescribed. Call your doctor if you think you are having a problem with your medicine. You will get more details on the specific medicines your doctor prescribes. Follow-up care is a key part of your treatment and safety. Be sure to make and go to all appointments, and call your doctor if you are having problems. It's also a good idea to know your test results and keep a list of the medicines you take.   Where can you learn more? Go to http://eulogio-lior.info/. Enter O108 in the search box to learn more about \"Learning About High Blood Sugar. \"  Current as of: July 25, 2018  Content Version: 11.9  © 1983-9748 [employer]. Care instructions adapted under license by [employer] (which disclaims liability or warranty for this information). If you have questions about a medical condition or this instruction, always ask your healthcare professional. [place] [address] any warranty or liability for your use of this information.

## 2019-07-19 LAB
BACTERIA UR CULT: NORMAL
BACTERIA UR CULT: NORMAL

## 2019-07-22 ENCOUNTER — OFFICE VISIT (OUTPATIENT)
Dept: FAMILY MEDICINE CLINIC | Age: 59
End: 2019-07-22

## 2019-07-22 ENCOUNTER — TELEPHONE (OUTPATIENT)
Dept: FAMILY MEDICINE CLINIC | Age: 59
End: 2019-07-22

## 2019-07-22 VITALS
WEIGHT: 214.4 LBS | HEART RATE: 75 BPM | DIASTOLIC BLOOD PRESSURE: 80 MMHG | BODY MASS INDEX: 33.65 KG/M2 | OXYGEN SATURATION: 95 % | RESPIRATION RATE: 20 BRPM | TEMPERATURE: 98.2 F | SYSTOLIC BLOOD PRESSURE: 132 MMHG | HEIGHT: 67 IN

## 2019-07-22 DIAGNOSIS — M54.12 CERVICAL RADICULOPATHY: ICD-10-CM

## 2019-07-22 DIAGNOSIS — N18.30 CKD (CHRONIC KIDNEY DISEASE) STAGE 3, GFR 30-59 ML/MIN (HCC): ICD-10-CM

## 2019-07-22 DIAGNOSIS — E11.21 TYPE 2 DIABETES WITH NEPHROPATHY (HCC): ICD-10-CM

## 2019-07-22 DIAGNOSIS — M17.10 ARTHRITIS OF KNEE: ICD-10-CM

## 2019-07-22 DIAGNOSIS — E11.65 UNCONTROLLED TYPE 2 DIABETES MELLITUS WITH HYPERGLYCEMIA (HCC): Primary | ICD-10-CM

## 2019-07-22 RX ORDER — INSULIN ASPART 100 [IU]/ML
INJECTION, SOLUTION INTRAVENOUS; SUBCUTANEOUS
Qty: 15 ML | Refills: 5 | Status: SHIPPED | OUTPATIENT
Start: 2019-07-22 | End: 2020-05-06 | Stop reason: ALTCHOICE

## 2019-07-22 RX ORDER — PREDNISONE 10 MG/1
TABLET ORAL
Qty: 21 TAB | Refills: 0 | Status: SHIPPED | OUTPATIENT
Start: 2019-07-22 | End: 2020-05-06 | Stop reason: ALTCHOICE

## 2019-07-22 NOTE — TELEPHONE ENCOUNTER
Pt was in office waiting on jessica to bring out diabetic testing information but had to leave to go to work  Pt would like a call at [phone]

## 2019-07-22 NOTE — LETTER
NOTIFICATION RETURN TO WORK / SCHOOL 
 
7/22/2019 11:15 AM 
 
Ms. Caryle Hikes [address] [place] [address] [address] [place] [address] To Whom It May Concern: 
 
Caryle Hikes is currently under the care of [address] [place] [address]. She was seen today for an office visit. If there are questions or concerns please have the patient contact our office. Sincerely, Alicia De La Torre MD

## 2019-07-22 NOTE — PROGRESS NOTES
HISTORY OF PRESENT ILLNESS  Tiffany El is a 62 y.o. female. HPI  FU hyperglycemia. Seen in ER 7/17/19 given IV fluids. Sugars cont to run in 300's range. She is having trouble with Toujeo pen, not getting full dose. There is insulin remaining in needle cap and it runs out. Pt prev seen by ENDO Dr Sameera Mcgee. She also wants to switch to [facility] for glucose monitoring. Also C/O pain behind L shoulder from neck and radiates down L arm. EKG done negative. Hx L rotator cuff surgery in past.  No limitation of ROM shoulder. Hx spondylosis cervical spine on old XR. C/O bilat knee pain. Request to see ORTHO. Review of Systems   Genitourinary: Positive for frequency. Musculoskeletal: Positive for joint pain and neck pain. All other systems reviewed and are negative. Patient Active Problem List   Diagnosis Code    Sleep apnea G47.30    Heart murmur R01.1    Iritis H20.9    DDD (degenerative disc disease), cervical M50.30    Vitamin D deficiency E55.9    GERD (gastroesophageal reflux disease) K21.9    Thyroid goiter E04.9    Hypercholesterolemia E78.00    Osteopenia M85.80    Uncontrolled type 2 diabetes mellitus with diabetic cataract, with long-term current use of insulin ([facility]) E11.36, Z79.4, E11.65    Nuclear cataract [id number]    Open angle with borderline findings and low glaucoma risk in both eyes H40.013    Encounter for long-term current use of medication Z79.899    Type 2 diabetes with nephropathy ([facility]) E11.21    CKD (chronic kidney disease) stage 3, GFR 30-59 ml/min ([facility]) N18.3     Current Outpatient Medications on File Prior to Visit   Medication Sig Dispense Refill    cyclobenzaprine (FLEXERIL) 10 mg tablet TAKE ONE TABLET BY MOUTH NIGHTLY 90 Tab 0    VITAMIN D2 50,000 unit capsule TAKE ONE CAPSULE BY MOUTH TWICE A WEEK 9 Cap 12    insulin glargine U-300 conc (TOUJEO SOLOSTAR U-300 INSULIN) 300 unit/mL (1.5 mL) inpn pen 140 Units by SubCUTAneous route daily.  14 Pen 5  glipiZIDE (GLUCOTROL) 10 mg tablet TAKE 1 TABLET BY MOUTH TWICE DAILY 60 Tab 5    pravastatin (PRAVACHOL) 40 mg tablet Take 1 Tab by mouth nightly. 30 Tab 5    losartan (COZAAR) 25 mg tablet Take 1 Tab by mouth daily. 30 Tab 5    omeprazole (PRILOSEC) 20 mg capsule TAKE ONE CAPSULE BY MOUTH ONCE DAILY 30 Cap 5    BD AUTOSHIELD DUO PEN NEEDLE 30 gauge x 3/16\" ndle       glucose blood VI test strips (BLOOD GLUCOSE TEST) strip Accu Check Siomara Smart View - test strips DX E11.9 Test blood sugar twice daily. 200 Strip 3    Lancets misc Accu Check Siomara Smart View Fast Click Drum. DX E11.9 Test blood sugar twice daily 200 Each 3    Blood-Glucose Meter monitoring kit ONE TOUCH ULTRA. Test once a day. .02 1 kit 0    aspirin delayed-release 81 mg tablet Take 81 mg by mouth daily. No current facility-administered medications on file prior to visit. Visit Vitals  /80 (BP 1 Location: Left arm, BP Patient Position: Sitting) Comment: manual   Pulse 75   Temp 98.2 °F (36.8 °C) (Oral)   Resp 20   Ht 5' 7\" (1.702 m)   Wt 214 lb 6.4 oz (97.3 kg)   LMP 10/14/1987   SpO2 95%   BMI 33.58 kg/m²       Physical Exam   Constitutional: She appears well-developed and well-nourished. Neck: Trachea normal. Muscular tenderness present. No neck rigidity. Decreased range of motion present. No edema present. Musculoskeletal:        Left shoulder: Normal. She exhibits normal range of motion, no tenderness, no bony tenderness and normal strength. Vitals reviewed. ASSESSMENT and PLAN    ICD-10-CM ICD-9-CM    1.  Uncontrolled type 2 diabetes mellitus with hyperglycemia (HCC) E11.65 250.02 insulin aspart U-100 (NOVOLOG FLEXPEN U-100 INSULIN) 100 unit/mL (3 mL) inpn      REFERRAL TO ENDOCRINOLOGY      flash glucose sensor (FREESTYLE VERONIQUE 14 DAY SENSOR) kit      flash glucose scanning reader (FREESTYLE VERONIQUE 14 DAY READER) [facility]      DISCONTINUED: flash glucose scanning reader (FREESTYLE VERONIQUE 14 DAY READER) misc      DISCONTINUED: flash glucose sensor (FREESTYLE VERONIQUE 14 DAY SENSOR) kit   2. Type 2 diabetes with nephropathy ([facility]) E11.21 250.40 REFERRAL TO ENDOCRINOLOGY     583.81    3. CKD (chronic kidney disease) stage 3, GFR 30-59 ml/min ([facility]) N18.3 585.3    4. Cervical radiculopathy M54.12 723. 4 predniSONE (STERAPRED DS) 10 mg dose pack   5. Arthritis of knee M17.10 716.96 REFERRAL TO ORTHOPEDIC SURGERY     Malfunction of Toujeo pen likely cause of higher sugars. She may not be getting entire dose from injection. I spoke with [facility] Pharmacist and pt will go there to show pen. Also order SS regular insulin for coverage of hyperglycemia while on prednisone. Refer to ENDO  Refer to [facility] monitoring system.   FU 1 week

## 2019-07-22 NOTE — PROGRESS NOTES
Identified pt with two pt identifiers(name and ). Chief Complaint   Patient presents with    Diabetes     sugars still running in 300's    Shoulder Pain     left side, goes down arm x 4 weeks        Health Maintenance Due   Topic    Shingrix Vaccine Age 50> (1 of 2)    BREAST CANCER SCRN MAMMOGRAM     PAP AKA CERVICAL CYTOLOGY        Wt Readings from Last 3 Encounters:   19 214 lb 6.4 oz (97.3 kg)   19 213 lb 6.4 oz (96.8 kg)   19 223 lb 3.2 oz (101.2 kg)     Temp Readings from Last 3 Encounters:   19 98.2 °F (36.8 °C) (Oral)   19 97.7 °F (36.5 °C)   19 98.4 °F (36.9 °C) (Oral)     BP Readings from Last 3 Encounters:   19 140/80   19 133/80   19 134/61     Pulse Readings from Last 3 Encounters:   19 75   19 95   19 86         Learning Assessment:  :     Learning Assessment 2017   PRIMARY LEARNER Patient Patient   HIGHEST LEVEL OF EDUCATION - PRIMARY LEARNER  - 2 YEARS OF COLLEGE   BARRIERS PRIMARY LEARNER - NONE   CO-LEARNER CAREGIVER - No   PRIMARY LANGUAGE ENGLISH ENGLISH   LEARNER PREFERENCE PRIMARY DEMONSTRATION DEMONSTRATION   ANSWERED BY patient self   RELATIONSHIP SELF SELF       Depression Screening:  :     3 most recent PHQ Screens 2019   Little interest or pleasure in doing things Not at all   Feeling down, depressed, irritable, or hopeless Not at all   Total Score PHQ 2 0       Fall Risk Assessment:  :     Fall Risk Assessment, last 12 mths 2019   Able to walk? Yes   Fall in past 12 months? No   Fall with injury? -   Number of falls in past 12 months -   Fall Risk Score -       Abuse Screening:  :     Abuse Screening Questionnaire 2019 2018 10/18/2017 2017 2016 2014   Do you ever feel afraid of your partner? N N N N N N   Are you in a relationship with someone who physically or mentally threatens you? N N N N N N   Is it safe for you to go home?  Hayward Lek       Coordination of Care Questionnaire:  :     1) Have you been to an emergency room, urgent care clinic since your last visit? yes 7/17/19 [facility]  Hospitalized since your last visit? no             2) Have you seen or consulted any other health care providers outside of [address] [place] [address] since your last visit? no  (Include any pap smears or colon screenings in this section.)    3) Do you have an Advance Directive on file? no  Are you interested in receiving information about Advance Directives? no    Reviewed record in preparation for visit and have obtained necessary documentation. Medication reconciliation up to date and corrected with patient at this time.

## 2019-07-23 LAB
EST. AVERAGE GLUCOSE BLD GHB EST-MCNC: 312 MG/DL
HBA1C MFR BLD: 12.5 % (ref 4.8–5.6)

## 2019-08-07 ENCOUNTER — OFFICE VISIT (OUTPATIENT)
Dept: SLEEP MEDICINE | Age: 59
End: 2019-08-07

## 2019-08-07 VITALS
WEIGHT: 220 LBS | HEIGHT: 67 IN | BODY MASS INDEX: 34.53 KG/M2 | SYSTOLIC BLOOD PRESSURE: 136 MMHG | HEART RATE: 77 BPM | OXYGEN SATURATION: 96 % | DIASTOLIC BLOOD PRESSURE: 72 MMHG

## 2019-08-07 DIAGNOSIS — G47.33 OSA (OBSTRUCTIVE SLEEP APNEA): Primary | ICD-10-CM

## 2019-08-07 DIAGNOSIS — E11.21 TYPE 2 DIABETES WITH NEPHROPATHY (HCC): ICD-10-CM

## 2019-08-07 NOTE — PROGRESS NOTES
[address] [facility] [place]. [place], [address]   Tel.  [phone]   Fax. [address] [facility] 60   [place], [address] [facility]   Tel.  [phone]   Fax. [phone] [address]   Tel.  [phone]   Fax. [phone]       Chief Complaint:      Chief Complaint   Patient presents with    Sleep Problem     Would like to restart PAP therapy       Subjective: Jose Parada is a 62y.o. year old female seen for follow up of sleep apnea,  Last seen by Dr. Domnick Mohs 6/29/2017. In lab sleep test 2/2017 showed AHI of 42.5/hr with a lowest SaO2 of 75%. She was previously not compliant on CPAP and no longer has a device. She reports wanting to re-start PAP therapy. We have discussed the benefits of PAP therapy and the related insurance requirements for use of a minimum of 4 hours at least 70% of the days in a 30 day period. The patient reports she has experienced excessive daytime sleepiness for a number of year (s) and it has worsened. The sleepiness is describes as being severe. The patient has been taking naps during the day. The patient experiences fatigue when driving, riding as a passenger, seated and inactive, reading, in conversation, at work, at [employer]. The severity of the day time fatigue has impacted the ability to function normally during the day. The patient reports she has been snoring for a number of years and her snoring has worsened. The snoring is exacerbated by sleeping supine. The patient reports poor concentration for years. The patient has noted problems with concentration, and memory. The patient reports she has experienced elevated blood pressure, non-restorative sleep, sleep paralysis, witnessed apnea. The patient retires at 11 pm and awakens at 5 am. The patient notes that she will experience frequent awakening from sleep. In general she is able to return to sleep after awakening.  she tends to awaken with an alarm. The patient has undergone diagnostic testing for the current problems. Review of Systems:  Constitutional:  No significant weight loss or weight gain in past 6 months  Eyes:  + blurred vision, r/t diabetes  CVS:  No significant chest pain  Pulm:  No significant shortness of breath  GI:  No significant nausea or vomiting  :  No significant nocturia  Musculoskeletal:  No significant joint pain at night  Skin:  No significant rashes  Neuro:  No significant dizziness   Psych:  No active mood issues    Sleep Review of Systems: notable for Positive difficulty falling asleep; Positive percieved regular dreaming; Negative nightmares; Negative heartburn; Positive caffeine;  Positive alcohol; negative history of any automobile or occupational accidents due to daytime drowsiness. Allergies   Allergen Reactions    Lisinopril Cough    Penicillin G Swelling       She has a current medication list which includes the following prescription(s): insulin aspart u-100, prednisone, cyclobenzaprine, vitamin d2, insulin glargine u-300 conc, glipizide, pravastatin, losartan, omeprazole, bd autoshield duo pen needle, glucose blood vi test strips, lancets, blood-glucose meter, aspirin delayed-release, flash glucose sensor, and flash glucose scanning reader. .      She  has a past medical history of Arthritis, GERD (gastroesophageal reflux disease), Heart murmur (11/8/2010), and Sleep apnea (11/8/2010). [place] Sleepiness Score: 17 and Modified F.O.S.Q. Score Total / 2: 12 which reflect significant sleepiness.       Objective:     Visit Vitals  /72 (BP 1 Location: Left arm, BP Patient Position: Sitting)   Pulse 77   Ht 5' 7\" (1.702 m)   Wt 220 lb (99.8 kg)   LMP 10/14/1987   SpO2 96%   BMI 34.46 kg/m²         General:   Alert, oriented, not in acute distress   Eyes:  Anicteric Sclerae; intact EOM's   Nose:  No obvious nasal septum deviation    Oropharynx:   Mallampati score 4, thick tongue base, uvula not seen due to low-lying soft palate, narrow tonsilo-pharyngeal pilars, tongue scalloped   Neck:   midline trachea,  no JVD   Chest/Lungs:  symmetrical lung expansion ,clear lung fields on auscultation    CVS:  Normal rate, regular rhythm    Extremities:  No obvious rashes, absent edema    Neuro:  No focal deficits; No obvious tremor    Psych:  Normal eye contact; normal  affect, normal countenance        Assessment:       ICD-10-CM ICD-9-CM    1. EDITH (obstructive sleep apnea) G47.33 327.23 AMB SUPPLY ORDER   2. Type 2 diabetes with nephropathy (HCC) E11.21 250.40      583.81    3. Adult BMI 34.0-34.9 kg/sq m Z68.34 V85.34      Patient has a history and examination consistent with the diagnosis of sleep apnea. Plan:     Follow-up and Dispositions    · Return in about 3 months (around 11/7/2019). * The patient currently has a High Risk for having sleep apnea. STOP-BANG score 7.    * She was provided information on sleep apnea including corresponding risk factors and the importance of proper treatment. * Treatment options were reviewed in detail. she would like to proceed with PAP therapy. APAP ordered. Patient will be seen in follow-up in 6-8 weeks after PAP setup to gauge treatment response and adherence to therapy. Orders Placed This Encounter    AMB SUPPLY ORDER     Respironics Dreamstation APAP 8 cm H20 to 16 cm H20 with heated humidifer  Modem, moe access to sleep center  Tubing (heated if necessary), filters, water resevoir, supplies  Mask-fitting evaluation and mask per patient preference    Length of need 99  G47.33      ROBERTO Dubose-BC; NPI: [id number]    Electronically signed. Date:- 08/07/19       * The patient was counselled regarding proper sleep hygiene, with emphasis on ensuring sufficient total sleep time and safe driving. * All of her questions were addressed. 2. Type II diabetes -  she continues on her current regimen.   I have reviewed the relationship between sleep disordered breathing as it relates to diabetes. 3. Recommended a dedicated weight loss program through appropriate diet and exercise regimen as significant weight reduction has been shown to reduce severity of obstructive sleep apnea. Patient's phone number [phone] (home)  was reviewed and confirmed for accuracy. She gives permission for messages regarding results and appointments to be left at that number. ROBERTO Barr-BC, [address] [place] [address]  Electronically signed.  08/07/19

## 2019-08-07 NOTE — PATIENT INSTRUCTIONS
[address] [facility] [place]. [place], [address]  Tel.  [phone]  Fax. 100 [facility] 60  [place], [address] [facility]  Tel.  [phone]  Fax. [phone] [address]  Tel.  [phone]  Fax. [phone]     Learning About CPAP for Sleep Apnea  What is CPAP? CPAP is a small machine that you use at home every night while you sleep. It increases air pressure in your throat to keep your airway open. When you have sleep apnea, this can help you sleep better so you feel much better. CPAP stands for \"continuous positive airway pressure. \"  The CPAP machine will have one of the following:  · A mask that covers your nose and mouth  · Prongs that fit into your nose  · A mask that covers your nose only, the most common type. This type is called NCPAP. The N stands for \"nasal.\"  Why is it done? CPAP is usually the best treatment for obstructive sleep apnea. It is the first treatment choice and the most widely used. Your doctor may suggest CPAP if you have:  · Moderate to severe sleep apnea. · Sleep apnea and coronary artery disease (CAD) or heart failure. How does it help? · CPAP can help you have more normal sleep, so you feel less sleepy and more alert during the daytime. · CPAP may help keep heart failure or other heart problems from getting worse. · NCPAP may help lower your blood pressure. · If you use CPAP, your bed partner may also sleep better because you are not snoring or restless. What are the side effects? Some people who use CPAP have:  · A dry or stuffy nose and a sore throat. · Irritated skin on the face. · Sore eyes. · Bloating. If you have any of these problems, work with your doctor to fix them. Here are some things you can try:  · Be sure the mask or nasal prongs fit well. · See if your doctor can adjust the pressure of your CPAP. · If your nose is dry, try a humidifier.   · If your nose is runny or stuffy, try decongestant medicine or a steroid nasal spray. If these things do not help, you might try a different type of machine. Some machines have air pressure that adjusts on its own. Others have air pressures that are different when you breathe in than when you breathe out. This may reduce discomfort caused by too much pressure in your nose. Where can you learn more? Go to Talicious.be  Enter Lourdessonal Brett in the search box to learn more about \"Learning About CPAP for Sleep Apnea. \"   © 5271-7904 Healthwise, Incorporated. Care instructions adapted under license by [employer] (which disclaims liability or warranty for this information). This care instruction is for use with your licensed healthcare professional. If you have questions about a medical condition or this instruction, always ask your healthcare professional. [place] [address] any warranty or liability for your use of this information. Content Version: 2.5.61523; Last Revised: January 11, 2010  PROPER SLEEP HYGIENE    What to avoid  · Do not have drinks with caffeine, such as coffee or black tea, for 8 hours before bed. · Do not smoke or use other types of tobacco near bedtime. Nicotine is a stimulant and can keep you awake. · Avoid drinking alcohol late in the evening, because it can cause you to wake in the middle of the night. · Do not eat a big meal close to bedtime. If you are hungry, eat a light snack. · Do not drink a lot of water close to bedtime, because the need to urinate may wake you up during the night. · Do not read or watch TV in bed. Use the bed only for sleeping and sexual activity. What to try  · Go to bed at the same time every night, and wake up at the same time every morning. Do not take naps during the day. · Keep your bedroom quiet, dark, and cool. · Get regular exercise, but not within 3 to 4 hours of your bedtime. .  · Sleep on a comfortable pillow and mattress.   · If watching the clock makes you anxious, turn it facing away from you so you cannot see the time. · If you worry when you lie down, start a worry book. Well before bedtime, write down your worries, and then set the book and your concerns aside. · Try meditation or other relaxation techniques before you go to bed. · If you cannot fall asleep, get up and go to another room until you feel sleepy. Do something relaxing. Repeat your bedtime routine before you go to bed again. · Make your house quiet and calm about an hour before bedtime. Turn down the lights, turn off the TV, log off the computer, and turn down the volume on music. This can help you relax after a busy day. Drowsy Driving: The [facility] 54 cites drowsiness as a causing factor in more than 969,064 police reported crashes annually, resulting in 76,000 injuries and 1,500 deaths. Other surveys suggest 55% of people polled have driven while drowsy in the past year, 23% had fallen asleep but not crashed, 3% crashed, and 2% had and accident due to drowsy driving. Who is at risk? Young Drivers: One study of drowsy driving accidents states that 55% of the drivers were under 25 years. Of those, 75% were male. Shift Workers and Travelers: People who work overnight or travel across time zones frequently are at higher risk of experiencing Circadian Rhythm Disorders. They are trying to work and function when their body is programed to sleep. Sleep Deprived: Lack of sleep has a serious impact on your ability to pay attention or focus on a task. Consistently getting less than the average of 8 hours your body needs creates partial or cumulative sleep deprivation. Untreated Sleep Disorders: Sleep Apnea, Narcolepsy, R.L.S., and other sleep disorders (untreated) prevent a person from getting enough restful sleep. This leads to excessive daytime sleepiness and increases the risk for drowsy driving accidents by up to 7 times.   Medications / Alcohol: Even over the counter medications can cause drowsiness. Medications that impair a drivers attention should have a warning label. Alcohol naturally makes you sleepy and on its own can cause accidents. Combined with excessive drowsiness its effects are amplified. Signs of Drowsy Driving:   * You don't remember driving the last few miles   * You may drift out of your dayan   * You are unable to focus and your thoughts wander   * You may yawn more often than normal   * You have difficulty keeping your eyes open / nodding off   * Missing traffic signs, speeding, or tailgating  Prevention-   Good sleep hygiene, lifestyle and behavioral choices have the most impact on drowsy driving. There is no substitute for sleep and the average person requires 8 hours nightly. If you find yourself driving drowsy, stop and sleep. Consider the sleep hygiene tips provided during your visit as well. Medication Refill Policy: Refills for all medications require 1 week advance notice. Please have your pharmacy fax a refill request. We are unable to fax, or call in \"controled substance\" medications and you will need to pick these prescriptions up from our office. Easyworks Universe Activation    Thank you for requesting access to [employer]. Please follow the instructions below to securely access and download your online medical record. Easy[employer] Universe allows you to send messages to your doctor, view your test results, renew your prescriptions, schedule appointments, and more. How Do I Sign Up? 1. In your internet browser, go to [email or web address]. 2. Click on the First Time User? Click Here link in the Sign In box. You will see the New Member Sign Up page. 3. Enter your Easyworks Universe Access Code exactly as it appears below. You will not need to use this code after youve completed the sign-up process. If you do not sign up before the expiration date, you must request a new code. Easy[employer] Access Code:  Activation code not generated  Current Inmoo Status: Active (This is the date your Inmoo access code will )    4. Enter the last four digits of your Social Security Number (xxxx) and Date of Birth (mm/dd/yyyy) as indicated and click Submit. You will be taken to the next sign-up page. 5. Create a [employer] ID. This will be your Inmoo login ID and cannot be changed, so think of one that is secure and easy to remember. 6. Create a Inmoo password. You can change your password at any time. 7. Enter your Password Reset Question and Answer. This can be used at a later time if you forget your password. 8. Enter your e-mail address. You will receive e-mail notification when new information is available in [address] 9. Click Sign Up. You can now view and download portions of your medical record. 10. Click the Download Summary menu link to download a portable copy of your medical information. Additional Information    If you have questions, please call [phone]. Remember, Inmoo is NOT to be used for urgent needs. For medical emergencies, dial 911.

## 2019-08-08 ENCOUNTER — DOCUMENTATION ONLY (OUTPATIENT)
Dept: SLEEP MEDICINE | Age: 59
End: 2019-08-08

## 2019-08-16 ENCOUNTER — TELEPHONE (OUTPATIENT)
Dept: FAMILY MEDICINE CLINIC | Age: 59
End: 2019-08-16

## 2019-08-16 DIAGNOSIS — E11.65 UNCONTROLLED TYPE 2 DIABETES MELLITUS WITH HYPERGLYCEMIA (HCC): Primary | ICD-10-CM

## 2019-09-09 RX ORDER — LOSARTAN POTASSIUM 25 MG/1
TABLET ORAL
Qty: 30 TAB | Refills: 5 | Status: SHIPPED | OUTPATIENT
Start: 2019-09-09 | End: 2020-03-16 | Stop reason: SDUPTHER

## 2019-09-16 LAB — HBA1C MFR BLD HPLC: 13 %

## 2019-11-20 RX ORDER — GLIPIZIDE 10 MG/1
TABLET ORAL
Qty: 60 TAB | Refills: 5 | Status: SHIPPED | OUTPATIENT
Start: 2019-11-20 | End: 2020-03-16 | Stop reason: SDUPTHER

## 2020-03-13 DIAGNOSIS — E78.00 HYPERCHOLESTEROLEMIA: ICD-10-CM

## 2020-03-13 RX ORDER — PRAVASTATIN SODIUM 40 MG/1
TABLET ORAL
Qty: 30 TAB | Refills: 0 | Status: SHIPPED | OUTPATIENT
Start: 2020-03-13 | End: 2020-05-06 | Stop reason: SDUPTHER

## 2020-03-16 RX ORDER — GLIPIZIDE 10 MG/1
10 TABLET ORAL 2 TIMES DAILY
Qty: 60 TAB | Refills: 0 | Status: SHIPPED | OUTPATIENT
Start: 2020-03-16 | End: 2020-07-07

## 2020-03-16 RX ORDER — INSULIN GLARGINE 300 U/ML
140 INJECTION, SOLUTION SUBCUTANEOUS DAILY
Qty: 14 PEN | Refills: 0 | Status: SHIPPED | OUTPATIENT
Start: 2020-03-16 | End: 2020-06-02 | Stop reason: SDUPTHER

## 2020-03-16 RX ORDER — LOSARTAN POTASSIUM 25 MG/1
TABLET ORAL
Qty: 30 TAB | Refills: 0 | Status: SHIPPED | OUTPATIENT
Start: 2020-03-16 | End: 2020-05-04 | Stop reason: SDUPTHER

## 2020-03-16 NOTE — TELEPHONE ENCOUNTER
----- Message from Franko Cardozo sent at 3/16/2020  9:54 AM EDT -----  Regarding: Yung Sierra MD  Medication Refill    Caller (if not patient):      Relationship of caller (if not patient):      Best contact number(s): [phone]      Name of medication and dosage if known:  insulin glargine U-300 conc (TOUJEO SOLOSTAR U-300 INSULIN) 300 unit/mL (1.5 mL) inpn pen, glipiZIDE (GLUCOTROL) 10 mg tablet, losartan (COZAAR) 25 mg tablet      Is patient out of this medication (yes/no): Yes      [facility], [facility], [address] [place] [address]    Pharmacy listed in chart? (yes/no):   Pharmacy phone number: [phone]      Details to clarify the request: Pt was recommended quarantine-requesting to have 90 day fill for glipizide and toujeo; 60 day supply for losartan.  Pt also requesting to transfer rx to [facility] 33

## 2020-03-16 NOTE — TELEPHONE ENCOUNTER
I spoke with pharmacist.  Pt is over due for FU of DM. I will refill meds for 30 days only. She is uncontrolled diabetic who has not been seen since July 2019!

## 2020-04-29 ENCOUNTER — TELEPHONE (OUTPATIENT)
Dept: FAMILY MEDICINE CLINIC | Age: 60
End: 2020-04-29

## 2020-05-04 RX ORDER — LOSARTAN POTASSIUM 25 MG/1
TABLET ORAL
Qty: 7 TAB | Refills: 0 | Status: SHIPPED | OUTPATIENT
Start: 2020-05-04 | End: 2020-05-06 | Stop reason: SDUPTHER

## 2020-05-04 NOTE — TELEPHONE ENCOUNTER
Pt has not been seen since 7/22/19 and was making a fuss over doing a virtual visit and making excuses not to have to do a virtual appt. Pt has been put on the schedule for Wednesday 5/6/20 but refuses to go to local store or gas station to get a good wifi connection. Pt stated out of losartan and wants refill sent over to pharmacy.   informed pt that a week supply could be sent but needs virtual visit first before anymore could be sent over

## 2020-05-06 ENCOUNTER — VIRTUAL VISIT (OUTPATIENT)
Dept: FAMILY MEDICINE CLINIC | Age: 60
End: 2020-05-06

## 2020-05-06 DIAGNOSIS — E55.9 VITAMIN D DEFICIENCY: ICD-10-CM

## 2020-05-06 DIAGNOSIS — R35.0 URINARY FREQUENCY: ICD-10-CM

## 2020-05-06 DIAGNOSIS — E78.00 HYPERCHOLESTEROLEMIA: ICD-10-CM

## 2020-05-06 DIAGNOSIS — Z79.899 ENCOUNTER FOR LONG-TERM CURRENT USE OF MEDICATION: ICD-10-CM

## 2020-05-06 DIAGNOSIS — E04.9 THYROID GOITER: ICD-10-CM

## 2020-05-06 DIAGNOSIS — E11.21 TYPE 2 DIABETES WITH NEPHROPATHY (HCC): ICD-10-CM

## 2020-05-06 DIAGNOSIS — M54.50 MIDLINE LOW BACK PAIN, UNSPECIFIED CHRONICITY, UNSPECIFIED WHETHER SCIATICA PRESENT: Primary | ICD-10-CM

## 2020-05-06 RX ORDER — LOSARTAN POTASSIUM 25 MG/1
TABLET ORAL
Qty: 90 TAB | Refills: 1 | Status: SHIPPED | OUTPATIENT
Start: 2020-05-06 | End: 2020-12-06

## 2020-05-06 RX ORDER — PRAVASTATIN SODIUM 40 MG/1
TABLET ORAL
Qty: 90 TAB | Refills: 1 | Status: SHIPPED | OUTPATIENT
Start: 2020-05-06 | End: 2020-10-24

## 2020-05-06 NOTE — PROGRESS NOTES
Identified pt with two pt identifiers(name and ). Chief Complaint   Patient presents with    Diabetes    Urinary Frequency     over a month    Medication Refill    Hypertension    Heart Problem     heart races at times    Memory Loss     more now than in the past     Numbness     her feet feel really cold and numb         Health Maintenance Due   Topic    Shingrix Vaccine Age 50> (1 of 2)    Breast Cancer Screen Mammogram     PAP AKA CERVICAL CYTOLOGY     A1C test (Diabetic or Prediabetic)     MICROALBUMIN Q1     Lipid Screen        Wt Readings from Last 3 Encounters:   19 220 lb (99.8 kg)   19 214 lb 6.4 oz (97.3 kg)   19 213 lb 6.4 oz (96.8 kg)     Temp Readings from Last 3 Encounters:   19 98.2 °F (36.8 °C) (Oral)   19 97.7 °F (36.5 °C)   19 98.4 °F (36.9 °C) (Oral)     BP Readings from Last 3 Encounters:   19 136/72   19 132/80   19 133/80     Pulse Readings from Last 3 Encounters:   19 77   19 75   19 95         Learning Assessment:  :     Learning Assessment 2017   PRIMARY LEARNER Patient Patient   HIGHEST LEVEL OF EDUCATION - PRIMARY LEARNER  - 2 YEARS OF COLLEGE   BARRIERS PRIMARY LEARNER - NONE   CO-LEARNER CAREGIVER - No   PRIMARY LANGUAGE ENGLISH ENGLISH   LEARNER PREFERENCE PRIMARY DEMONSTRATION DEMONSTRATION   ANSWERED BY patient self   RELATIONSHIP SELF SELF       Depression Screening:  :     3 most recent PHQ Screens 2020   Little interest or pleasure in doing things Not at all   Feeling down, depressed, irritable, or hopeless Not at all   Total Score PHQ 2 0       Fall Risk Assessment:  :     Fall Risk Assessment, last 12 mths 2019   Able to walk? Yes   Fall in past 12 months?  No   Fall with injury? -   Number of falls in past 12 months -   Fall Risk Score -       Abuse Screening:  :     Abuse Screening Questionnaire 2020 2019 2018 10/18/2017 2017 2016 2014   Do you ever feel afraid of your partner? N N N N N N N   Are you in a relationship with someone who physically or mentally threatens you? N N N N N N N   Is it safe for you to go home? Y Y Y Y Y Y Y       Coordination of Care Questionnaire:  :     1) Have you been to an emergency room, urgent care clinic since your last visit? no   Hospitalized since your last visit? no             2) Have you seen or consulted any other health care providers outside of [address] [place] [address] since your last visit? no  (Include any pap smears or colon screenings in this section.)    3) Do you have an Advance Directive on file? no  Are you interested in receiving information about Advance Directives? no    Reviewed record in preparation for visit and have obtained necessary documentation. Medication reconciliation up to date and corrected with patient at this time.

## 2020-05-06 NOTE — PROGRESS NOTES
Gabi Sy is a 56 y.o. female who was seen by synchronous (real-time) audio-video technology on 5/6/2020. Consent: Gabi Sy, who was seen by synchronous (real-time) audio-video technology, and/or her healthcare decision maker, is aware that this patient-initiated, Telehealth encounter on 5/6/2020 is a billable service, with coverage as determined by her insurance carrier. She is aware that she may receive a bill and has provided verbal consent to proceed: Yes. Assessment & Plan:   Diagnoses and all orders for this visit:    1. Midline low back pain, unspecified chronicity, unspecified whether sciatica present  -     XR SPINE LUMB 2 OR 3 V; Future    2. Uncontrolled type 2 diabetes mellitus with diabetic cataract, with long-term current use of insulin (HCC)  -     losartan (COZAAR) 25 mg tablet; TAKE 1 TABLET BY MOUTH ONCE DAILY  -     LIPID PANEL; Future  -     MICROALBUMIN, UR, RAND W/ MICROALB/CREAT RATIO; Future  -     HEMOGLOBIN A1C WITH EAG; Future    3. Hypercholesterolemia  -     pravastatin (PRAVACHOL) 40 mg tablet; TAKE ONE TABLET BY MOUTH NIGHTLY. 4. Type 2 diabetes with nephropathy ([facility]    5. Urinary frequency  -     CULTURE, URINE; Future  -     URINALYSIS W/ RFLX MICROSCOPIC; Future    6. Vitamin D deficiency  -     VITAMIN D, 25 HYDROXY; Future    7. Thyroid goiter  -     TSH 3RD GENERATION; Future    8. Encounter for long-term current use of medication  -     CBC WITH AUTOMATED DIFF; Future  -     METABOLIC PANEL, COMPREHENSIVE; Future    fax lab orders to [facility]. Include urine cx. Order XR L spine. She declined referral to Neurology at this time regarding memory issues. I spent at least 23 minutes on this visit with this established patient. [phone]  Subjective: Gabi Sy is a 56 y.o. female who was seen for Diabetes; Urinary Frequency (over a month); Medication Refill; Hypertension;  Heart Problem (heart races at times); Memory Loss (more now than in the past ); and Numbness (her feet feel really cold and numb )      Prior to Admission medications    Medication Sig Start Date End Date Taking? Authorizing Provider   losartan (COZAAR) 25 mg tablet TAKE 1 TABLET BY MOUTH ONCE DAILY 9/5/07  Yes Yelena Delaney MD   pravastatin (PRAVACHOL) 40 mg tablet TAKE ONE TABLET BY MOUTH NIGHTLY. 4/7/90  Yes Yelena Delaney MD   insulin glargine U-300 conc (Toujeo SoloStar U-300 Insulin) 300 unit/mL (1.5 mL) inpn pen 140 Units by SubCUTAneous route daily. Need office visit 3/30/91  Yes Yelena Delaney MD   glipiZIDE (GLUCOTROL) 10 mg tablet Take 1 Tab by mouth two (2) times a day. Need office visit. 5/01/48  Yes Yelena Delaney MD   glucose blood VI test strips (ONETOUCH ULTRA BLUE TEST STRIP) strip Test sugar 4 times a day. DX E11.65 7/87/00  Yes Yelena Delaney MD   VITAMIN D2 50,000 unit capsule TAKE ONE CAPSULE BY MOUTH TWICE A WEEK 7/17/41  Yes Yelena Delaney MD   omeprazole (PRILOSEC) 20 mg capsule TAKE ONE CAPSULE BY MOUTH ONCE DAILY 9/69/57  Yes Yelena Delaney MD   BD AUTOSHIELD DUO PEN NEEDLE 30 gauge x 3/16\" ndle  12/8/15  Yes Provider, Historical   Lancets misc Accu Check Siomara Smart View Fast Click Drum. DX E11.9 Test blood sugar twice daily 94/5/87  Yes Yelena Delaney MD   Blood-Glucose Meter monitoring kit ONE TOUCH ULTRA. Test once a day. .02 86/58/80  Yes Yelena Delaney MD   aspirin delayed-release 81 mg tablet Take 81 mg by mouth daily.    Yes Provider, Historical   cyclobenzaprine (FLEXERIL) 10 mg tablet TAKE ONE TABLET BY MOUTH NIGHTLY 7/15/19   Jennifer Das NP     Allergies   Allergen Reactions    Lisinopril Cough    Penicillin G Swelling       Patient Active Problem List    Diagnosis Date Noted    Uncontrolled type 2 diabetes mellitus with diabetic cataract, with long-term current use of insulin ([facility] 05/15/2015     Priority: 1 - One    Type 2 diabetes with nephropathy ([facility] 07/22/2019    CKD (chronic kidney disease) stage 3, GFR 30-59 ml/min ([facility]) 07/22/2019    Encounter for long-term current use of medication 04/05/2019    Nuclear cataract 07/12/2016    Open angle with borderline findings and low glaucoma risk in both eyes 07/12/2016    Osteopenia 05/15/2015    Hypercholesterolemia 04/28/2013    Thyroid goiter 04/19/2013    GERD (gastroesophageal reflux disease) 06/22/2012    Iritis 12/23/2011    DDD (degenerative disc disease), cervical 12/23/2011    Vitamin D deficiency 12/23/2011    Sleep apnea 11/08/2010    Heart murmur 11/08/2010     Current Outpatient Medications   Medication Sig Dispense Refill    losartan (COZAAR) 25 mg tablet TAKE 1 TABLET BY MOUTH ONCE DAILY 90 Tab 1    pravastatin (PRAVACHOL) 40 mg tablet TAKE ONE TABLET BY MOUTH NIGHTLY. 90 Tab 1    insulin glargine U-300 conc (Toujeo SoloStar U-300 Insulin) 300 unit/mL (1.5 mL) inpn pen 140 Units by SubCUTAneous route daily. Need office visit 14 Pen 0    glipiZIDE (GLUCOTROL) 10 mg tablet Take 1 Tab by mouth two (2) times a day. Need office visit. 60 Tab 0    glucose blood VI test strips (ONETOUCH ULTRA BLUE TEST STRIP) strip Test sugar 4 times a day. DX E11.65 100 Strip 5    VITAMIN D2 50,000 unit capsule TAKE ONE CAPSULE BY MOUTH TWICE A WEEK 9 Cap 12    omeprazole (PRILOSEC) 20 mg capsule TAKE ONE CAPSULE BY MOUTH ONCE DAILY 30 Cap 5    BD AUTOSHIELD DUO PEN NEEDLE 30 gauge x 3/16\" ndle       Lancets misc Accu Check Siomara Smart View Fast Click Drum. DX E11.9 Test blood sugar twice daily 200 Each 3    Blood-Glucose Meter monitoring kit ONE TOUCH ULTRA. Test once a day. .02 1 kit 0    aspirin delayed-release 81 mg tablet Take 81 mg by mouth daily.       cyclobenzaprine (FLEXERIL) 10 mg tablet TAKE ONE TABLET BY MOUTH NIGHTLY 90 Tab 0     Allergies   Allergen Reactions    Lisinopril Cough    Penicillin G Swelling     Past Medical History:   Diagnosis Date    Arthritis     GERD (gastroesophageal reflux disease)     Heart murmur 11/8/2010    Sleep apnea 11/8/2010     Past Surgical History:   Procedure Laterality Date    HX HEENT      uvuloplasty    HX HYSTERECTOMY      HX ORTHOPAEDIC Left     rotator cuff     Family History   Problem Relation Age of Onset    Hypertension Mother     Diabetes Father     Heart Disease Father     Diabetes Maternal Grandmother     Diabetes Paternal Grandmother      Social History     Tobacco Use    Smoking status: Never Smoker    Smokeless tobacco: Never Used   Substance Use Topics    Alcohol use: Yes     Comment: ocassional       ROS  1. Urine frequency. No pain. No vaginal DC. 2. BS running 120-200. Not use Freestyle Keith. Too expensive. Taking Toujeo 140 units and Glipizide BID. Not use Novolog sliding scale. Due for labs. 3. Short spells of heart racing. Infrequent. No CP  4. C/O \"chills\" sensation L leg. Also LBP. No problem walking. No skin discoloration. No leg pain. Objective:     Visit Vitals  LMP 10/14/1987      General: alert, cooperative, no distress   Mental  status: normal mood, behavior, speech, dress, motor activity, and thought processes, able to follow commands   HENT: NCAT   Neck: no visualized mass   Resp: no respiratory distress   Neuro: no gross deficits   Skin: no discoloration or lesions of concern on visible areas   Psychiatric: normal affect, consistent with stated mood, no evidence of hallucinations     Additional exam findings: We discussed the expected course, resolution and complications of the diagnosis(es) in detail. Medication risks, benefits, costs, interactions, and alternatives were discussed as indicated. I advised her to contact the office if her condition worsens, changes or fails to improve as anticipated. She expressed understanding with the diagnosis(es) and plan. Thuy Garcia is a 56 y.o. female who was evaluated by a video visit encounter for concerns as above.  Patient identification was verified prior to start of the visit. A caregiver was present when appropriate. Due to this being a TeleHealth encounter (During YOZRW-70 public health emergency), evaluation of the following organ systems was limited: Vitals/Constitutional/EENT/Resp/CV/GI//MS/Neuro/Skin/Heme-Lymph-Imm. Pursuant to the emergency declaration under the [address] [place], [place] [address] waiver authority and the [employer] Act, this Virtual  Visit was conducted, with patient's (and/or legal guardian's) consent, to reduce the patient's risk of exposure to COVID-19 and provide necessary medical care. Services were provided through a video synchronous discussion virtually to substitute for in-person clinic visit. This visit was completed virtually using DOXY. ME. Patient was instructed to follow up as needed. POS Patient home. Provider was at the office.           Annika Brown MD

## 2020-05-07 ENCOUNTER — TELEPHONE (OUTPATIENT)
Dept: FAMILY MEDICINE CLINIC | Age: 60
End: 2020-05-07

## 2020-05-07 ENCOUNTER — HOSPITAL ENCOUNTER (OUTPATIENT)
Dept: GENERAL RADIOLOGY | Age: 60
Discharge: HOME | End: 2020-05-07
Attending: FAMILY MEDICINE
Payer: COMMERCIAL

## 2020-05-07 DIAGNOSIS — M54.50 MIDLINE LOW BACK PAIN, UNSPECIFIED CHRONICITY, UNSPECIFIED WHETHER SCIATICA PRESENT: ICD-10-CM

## 2020-05-07 PROCEDURE — 72100 X-RAY EXAM L-S SPINE 2/3 VWS: CPT

## 2020-05-09 LAB
25(OH)D3+25(OH)D2 SERPL-MCNC: 34.5 NG/ML (ref 30–100)
ALBUMIN SERPL-MCNC: 4.2 G/DL (ref 3.8–4.8)
ALBUMIN/CREAT UR: 4 MG/G CREAT (ref 0–29)
ALBUMIN/GLOB SERPL: 1.8 {RATIO} (ref 1.2–2.2)
ALP SERPL-CCNC: 85 IU/L (ref 39–117)
ALT SERPL-CCNC: 21 IU/L (ref 0–32)
APPEARANCE UR: CLEAR
AST SERPL-CCNC: 16 IU/L (ref 0–40)
BACTERIA UR CULT: NO GROWTH
BACTERIA UR CULT: NORMAL
BASOPHILS # BLD AUTO: 0 X10E3/UL (ref 0–0.2)
BASOPHILS NFR BLD AUTO: 1 %
BILIRUB SERPL-MCNC: 0.4 MG/DL (ref 0–1.2)
BILIRUB UR QL STRIP: NEGATIVE
BUN SERPL-MCNC: 14 MG/DL (ref 8–27)
BUN/CREAT SERPL: 16 (ref 12–28)
CALCIUM SERPL-MCNC: 9.2 MG/DL (ref 8.7–10.3)
CHLORIDE SERPL-SCNC: 105 MMOL/L (ref 96–106)
CHOLEST SERPL-MCNC: 153 MG/DL (ref 100–199)
CO2 SERPL-SCNC: 24 MMOL/L (ref 20–29)
COLOR UR: YELLOW
CREAT SERPL-MCNC: 0.9 MG/DL (ref 0.57–1)
CREAT UR-MCNC: 131.3 MG/DL
EOSINOPHIL # BLD AUTO: 0.1 X10E3/UL (ref 0–0.4)
EOSINOPHIL NFR BLD AUTO: 2 %
ERYTHROCYTE [DISTWIDTH] IN BLOOD BY AUTOMATED COUNT: 12.4 % (ref 11.7–15.4)
EST. AVERAGE GLUCOSE BLD GHB EST-MCNC: 189 MG/DL
GLOBULIN SER CALC-MCNC: 2.3 G/DL (ref 1.5–4.5)
GLUCOSE SERPL-MCNC: 129 MG/DL (ref 65–99)
GLUCOSE UR QL: NEGATIVE
HBA1C MFR BLD: 8.2 % (ref 4.8–5.6)
HCT VFR BLD AUTO: 38.6 % (ref 34–46.6)
HDLC SERPL-MCNC: 55 MG/DL
HGB BLD-MCNC: 12.7 G/DL (ref 11.1–15.9)
HGB UR QL STRIP: NEGATIVE
IMM GRANULOCYTES # BLD AUTO: 0 X10E3/UL (ref 0–0.1)
IMM GRANULOCYTES NFR BLD AUTO: 0 %
INTERPRETATION, 910389: NORMAL
KETONES UR QL STRIP: NEGATIVE
LDLC SERPL CALC-MCNC: 82 MG/DL (ref 0–99)
LEUKOCYTE ESTERASE UR QL STRIP: NEGATIVE
LYMPHOCYTES # BLD AUTO: 1.9 X10E3/UL (ref 0.7–3.1)
LYMPHOCYTES NFR BLD AUTO: 33 %
Lab: NORMAL
MCH RBC QN AUTO: 27.4 PG (ref 26.6–33)
MCHC RBC AUTO-ENTMCNC: 32.9 G/DL (ref 31.5–35.7)
MCV RBC AUTO: 83 FL (ref 79–97)
MICRO URNS: NORMAL
MICROALBUMIN UR-MCNC: 4.9 UG/ML
MONOCYTES # BLD AUTO: 0.5 X10E3/UL (ref 0.1–0.9)
MONOCYTES NFR BLD AUTO: 9 %
NEUTROPHILS # BLD AUTO: 3.2 X10E3/UL (ref 1.4–7)
NEUTROPHILS NFR BLD AUTO: 55 %
NITRITE UR QL STRIP: NEGATIVE
PH UR STRIP: 7 [PH] (ref 5–7.5)
PLATELET # BLD AUTO: 309 X10E3/UL (ref 150–450)
POTASSIUM SERPL-SCNC: 4.5 MMOL/L (ref 3.5–5.2)
PROT SERPL-MCNC: 6.5 G/DL (ref 6–8.5)
PROT UR QL STRIP: NEGATIVE
RBC # BLD AUTO: 4.63 X10E6/UL (ref 3.77–5.28)
SODIUM SERPL-SCNC: 142 MMOL/L (ref 134–144)
SP GR UR: 1.02 (ref 1–1.03)
TRIGL SERPL-MCNC: 82 MG/DL (ref 0–149)
TSH SERPL DL<=0.005 MIU/L-ACNC: 1.42 UIU/ML (ref 0.45–4.5)
UROBILINOGEN UR STRIP-MCNC: 1 MG/DL (ref 0.2–1)
VLDLC SERPL CALC-MCNC: 16 MG/DL (ref 5–40)
WBC # BLD AUTO: 5.8 X10E3/UL (ref 3.4–10.8)

## 2020-05-12 ENCOUNTER — TELEPHONE (OUTPATIENT)
Dept: FAMILY MEDICINE CLINIC | Age: 60
End: 2020-05-12

## 2020-05-12 NOTE — TELEPHONE ENCOUNTER
Labs look great!!  Congratulations in getting sugar under control. Good improvement in A1C level. Good cholesterol. Normal kidney, liver, and thyroid tests. Good Vit D level. No urine infection. Keep up the good work! Continue on all current medicines.

## 2020-05-12 NOTE — TELEPHONE ENCOUNTER
----- Message from Lorraine Thompson MD sent at 5/12/2020 11:32 AM EDT -----  Labs look great!!  Congratulations in getting sugar under control. Good improvement in A1C level. Good cholesterol. Normal kidney, liver, and thyroid tests. Good Vit D level. No urine infection. Keep up the good work! Continue on all current medicines.

## 2020-05-12 NOTE — TELEPHONE ENCOUNTER
We had a bad connection. I called back and it was still bad. LVM with Dr Delfin Donovan result note. I am also sending a letter.

## 2020-06-02 RX ORDER — INSULIN GLARGINE 300 U/ML
140 INJECTION, SOLUTION SUBCUTANEOUS DAILY
Qty: 14 ML | Refills: 5 | Status: SHIPPED | OUTPATIENT
Start: 2020-06-02 | End: 2020-12-06

## 2020-06-08 ENCOUNTER — HOSPITAL ENCOUNTER (EMERGENCY)
Age: 60
Discharge: HOME | End: 2020-06-08
Attending: EMERGENCY MEDICINE | Admitting: EMERGENCY MEDICINE
Payer: COMMERCIAL

## 2020-06-08 ENCOUNTER — APPOINTMENT (OUTPATIENT)
Dept: GENERAL RADIOLOGY | Age: 60
End: 2020-06-08
Attending: EMERGENCY MEDICINE
Payer: COMMERCIAL

## 2020-06-08 VITALS
HEART RATE: 80 BPM | OXYGEN SATURATION: 93 % | SYSTOLIC BLOOD PRESSURE: 134 MMHG | HEIGHT: 67 IN | WEIGHT: 223.99 LBS | TEMPERATURE: 100 F | BODY MASS INDEX: 35.16 KG/M2 | RESPIRATION RATE: 16 BRPM | DIASTOLIC BLOOD PRESSURE: 67 MMHG

## 2020-06-08 DIAGNOSIS — B34.9 VIRAL SYNDROME: Primary | ICD-10-CM

## 2020-06-08 LAB
ALBUMIN SERPL-MCNC: 3.3 G/DL (ref 3.5–5)
ALBUMIN/GLOB SERPL: 0.8 {RATIO} (ref 1.1–2.2)
ALP SERPL-CCNC: 90 U/L (ref 45–117)
ALT SERPL-CCNC: 27 U/L (ref 12–78)
ANION GAP SERPL CALC-SCNC: 11 MMOL/L (ref 5–15)
APPEARANCE UR: CLEAR
AST SERPL-CCNC: 30 U/L (ref 15–37)
BACTERIA URNS QL MICRO: ABNORMAL /HPF
BASOPHILS # BLD: 0 K/UL (ref 0–0.1)
BASOPHILS NFR BLD: 0 % (ref 0–1)
BILIRUB SERPL-MCNC: 1 MG/DL (ref 0.2–1)
BILIRUB UR QL CFM: NEGATIVE
BUN SERPL-MCNC: 14 MG/DL (ref 6–20)
BUN/CREAT SERPL: 13 (ref 12–20)
CALCIUM SERPL-MCNC: 8.8 MG/DL (ref 8.5–10.1)
CHLORIDE SERPL-SCNC: 98 MMOL/L (ref 97–108)
CO2 SERPL-SCNC: 25 MMOL/L (ref 21–32)
COLOR UR: ABNORMAL
COMMENT, HOLDF: NORMAL
CREAT SERPL-MCNC: 1.04 MG/DL (ref 0.55–1.02)
DIFFERENTIAL METHOD BLD: ABNORMAL
EOSINOPHIL # BLD: 0 K/UL (ref 0–0.4)
EOSINOPHIL NFR BLD: 0 % (ref 0–7)
EPITH CASTS URNS QL MICRO: ABNORMAL /LPF
ERYTHROCYTE [DISTWIDTH] IN BLOOD BY AUTOMATED COUNT: 12.7 % (ref 11.5–14.5)
GLOBULIN SER CALC-MCNC: 4.4 G/DL (ref 2–4)
GLUCOSE SERPL-MCNC: 173 MG/DL (ref 65–100)
GLUCOSE UR STRIP.AUTO-MCNC: NEGATIVE MG/DL
HCT VFR BLD AUTO: 37.5 % (ref 35–47)
HGB BLD-MCNC: 12.1 G/DL (ref 11.5–16)
HGB UR QL STRIP: ABNORMAL
HYALINE CASTS URNS QL MICRO: ABNORMAL /LPF (ref 0–5)
IMM GRANULOCYTES # BLD AUTO: 0.1 K/UL (ref 0–0.04)
IMM GRANULOCYTES NFR BLD AUTO: 1 % (ref 0–0.5)
KETONES UR QL STRIP.AUTO: NEGATIVE MG/DL
LEUKOCYTE ESTERASE UR QL STRIP.AUTO: NEGATIVE
LYMPHOCYTES # BLD: 1 K/UL (ref 0.8–3.5)
LYMPHOCYTES NFR BLD: 9 % (ref 12–49)
MCH RBC QN AUTO: 26.9 PG (ref 26–34)
MCHC RBC AUTO-ENTMCNC: 32.3 G/DL (ref 30–36.5)
MCV RBC AUTO: 83.5 FL (ref 80–99)
MONOCYTES # BLD: 0.7 K/UL (ref 0–1)
MONOCYTES NFR BLD: 6 % (ref 5–13)
MUCOUS THREADS URNS QL MICRO: ABNORMAL /LPF
NEUTS SEG # BLD: 9.2 K/UL (ref 1.8–8)
NEUTS SEG NFR BLD: 84 % (ref 32–75)
NITRITE UR QL STRIP.AUTO: NEGATIVE
NRBC # BLD: 0 K/UL (ref 0–0.01)
NRBC BLD-RTO: 0 PER 100 WBC
PH UR STRIP: 5.5 [PH] (ref 5–8)
PLATELET # BLD AUTO: 308 K/UL (ref 150–400)
PMV BLD AUTO: 10.5 FL (ref 8.9–12.9)
POTASSIUM SERPL-SCNC: 3.4 MMOL/L (ref 3.5–5.1)
PROT SERPL-MCNC: 7.7 G/DL (ref 6.4–8.2)
PROT UR STRIP-MCNC: 100 MG/DL
RBC # BLD AUTO: 4.49 M/UL (ref 3.8–5.2)
RBC #/AREA URNS HPF: ABNORMAL /HPF (ref 0–5)
SAMPLES BEING HELD,HOLD: NORMAL
SODIUM SERPL-SCNC: 134 MMOL/L (ref 136–145)
SP GR UR REFRACTOMETRY: 1.02 (ref 1–1.03)
UR CULT HOLD, URHOLD: NORMAL
UROBILINOGEN UR QL STRIP.AUTO: 0.2 EU/DL (ref 0.2–1)
WBC # BLD AUTO: 10.9 K/UL (ref 3.6–11)
WBC URNS QL MICRO: ABNORMAL /HPF (ref 0–4)

## 2020-06-08 PROCEDURE — 99284 EMERGENCY DEPT VISIT MOD MDM: CPT

## 2020-06-08 PROCEDURE — 74011250636 HC RX REV CODE- 250/636: Performed by: EMERGENCY MEDICINE

## 2020-06-08 PROCEDURE — 80053 COMPREHEN METABOLIC PANEL: CPT

## 2020-06-08 PROCEDURE — 81001 URINALYSIS AUTO W/SCOPE: CPT

## 2020-06-08 PROCEDURE — 36415 COLL VENOUS BLD VENIPUNCTURE: CPT

## 2020-06-08 PROCEDURE — 87635 SARS-COV-2 COVID-19 AMP PRB: CPT

## 2020-06-08 PROCEDURE — 71045 X-RAY EXAM CHEST 1 VIEW: CPT

## 2020-06-08 PROCEDURE — 85025 COMPLETE CBC W/AUTO DIFF WBC: CPT

## 2020-06-08 RX ADMIN — SODIUM CHLORIDE 1000 ML: 900 INJECTION, SOLUTION INTRAVENOUS at 12:26

## 2020-06-08 NOTE — DISCHARGE INSTRUCTIONS
Take Imodium as needed for diarrhea    Your physician or healthcare provider has determined that you are at risk for the Coronavirus (COVID-19). If you have met CDC criteria, your physician may have sent a laboratory test.  Please adhere to the following restrictions until you obtain your results or are cleared by your primary care doctor:     Stay at home except to get medical care. Seek medical attention if you develop worsening symptoms or new concerns such as severe shortness of breath, chest pain, etc.     Separate yourself from other people and animals in your home. If possible, stay in a separate room and use a separate bathroom from others in your house. Restrict contact with pets, as there is a possibility of transmission of the virus.  Call your doctor before showing up at their office. Let them know you have or may have COVID-19     Wear a facemask when you are around other people.  Cover your mouth when you cough or sneeze.  Wash your hands often with warm soapy water for at least 20 seconds. If soap and water are not available, use an alcohol based hand .  Clean all high touch surfaces everyday. For example: counters, tabletops, doorknobs, bathroom fixtures, toilets, phones, keyboards, tablets, and bedside tables.  Monitor your symptoms at home. Seek prompt medical attention if you symptoms worsen. (i.e. difficulty breathing). Call your healthcare provider before coming and tell them you may have COVID-19. Wear a mask upon entering the facility.      Stay at home until all these things have happened:   You have had no fevers for at least 72 hours (without fever reducing meds)  AND   Your other symptoms have improved  (e.g. cough, shortness of breath) AND   At least 7 days have passed since the beginning of your symptoms      Source: [employer] website ([employer])      If you have further questions about the Coronavirus ([id number]), please contact the [address] [place] [address] at [phone], the [address] [place] [address] at [phone] or [facility] [phone].

## 2020-06-08 NOTE — ED NOTES
The patient was discharged home by Dr. Manasa Frias in stable condition. The patient is alert and oriented, in no respiratory distress and discharge vital signs obtained. The patient's diagnosis, condition and treatment were explained. COVID-19 swab obtained, sent to lab. The patient expressed understanding. No prescriptions given/e-scribed to pharmacy. No work/school note given. A discharge plan has been developed. A  was not involved in the process. Aftercare instructions were given. Pt ambulatory out of the ED.

## 2020-06-08 NOTE — ED TRIAGE NOTES
Pt here with fever for 3 days. Diarrhea (unsure if related to stool softner) minor cough. General malaise. Pt report headache.

## 2020-06-08 NOTE — ED PROVIDER NOTES
73-year-old female with a history of obesity, diabetes presents with diarrhea, cough, headache that started 2 days ago. She denies any sick contacts. She is not sure if the diarrhea is from taking a stool softener. She has had a fever up to 103.7. She states that she is urinating more with bowel movements that are very frequent. She rates her headache 5 out of 10. She states that she has had a mild nonproductive cough but attributes it to possibly not taking her reflux medicine. Fever    Associated symptoms include diarrhea and cough. Pertinent negatives include no chest pain, no headaches, no sore throat, no shortness of breath and no rash. Diarrhea    Associated symptoms include a fever and diarrhea. Pertinent negatives include no headaches, no chest pain and no back pain. Cough   Pertinent negatives include no chest pain, no chills, no ear pain, no headaches, no sore throat and no shortness of breath.         Past Medical History:   Diagnosis Date    Arthritis     GERD (gastroesophageal reflux disease)     Heart murmur 11/8/2010    Sleep apnea 11/8/2010       Past Surgical History:   Procedure Laterality Date    HX HEENT      uvuloplasty    HX HYSTERECTOMY      HX ORTHOPAEDIC Left     rotator cuff         Family History:   Problem Relation Age of Onset    Hypertension Mother     Diabetes Father     Heart Disease Father     Diabetes Maternal Grandmother     Diabetes Paternal Grandmother        Social History     Socioeconomic History    Marital status:      Spouse name: Not on file    Number of children: Not on file    Years of education: Not on file    Highest education level: Not on file   Occupational History    Not on file   Social Needs    Financial resource strain: Not on file    Food insecurity     Worry: Not on file     Inability: Not on file    Transportation needs     Medical: Not on file     Non-medical: Not on file   Tobacco Use    Smoking status: Never Smoker  Smokeless tobacco: Never Used   Substance and Sexual Activity    Alcohol use: Yes     Comment: ocassional    Drug use: No    Sexual activity: Yes     Partners: Male   Lifestyle    Physical activity     Days per week: Not on file     Minutes per session: Not on file    Stress: Not on file   Relationships    Social connections     Talks on phone: Not on file     Gets together: Not on file     Attends Jain service: Not on file     Active member of club or organization: Not on file     Attends meetings of clubs or organizations: Not on file     Relationship status: Not on file    Intimate partner violence     Fear of current or ex partner: Not on file     Emotionally abused: Not on file     Physically abused: Not on file     Forced sexual activity: Not on file   Other Topics Concern    Not on file   Social History Narrative    Not on file         ALLERGIES: Lisinopril and Penicillin g    Review of Systems   Constitutional: Positive for fever. Negative for chills. HENT: Negative for ear pain and sore throat. Eyes: Negative for pain. Respiratory: Positive for cough. Negative for chest tightness and shortness of breath. Cardiovascular: Negative for chest pain. Gastrointestinal: Positive for diarrhea. Negative for abdominal pain. Genitourinary: Negative for flank pain. Musculoskeletal: Negative for back pain. Skin: Negative for rash. Neurological: Negative for headaches. All other systems reviewed and are negative. Vitals:    06/08/20 1139   BP: 162/66   Pulse: 79   Resp: 18   Temp: 100 °F (37.8 °C)   SpO2: 95%   Weight: 101.6 kg (223 lb 15.8 oz)   Height: 5' 7\" (1.702 m)            Physical Exam  Vitals signs and nursing note reviewed. Constitutional:       General: She is not in acute distress. Appearance: She is not ill-appearing or toxic-appearing. HENT:      Head: Normocephalic and atraumatic.       Mouth/Throat:      Mouth: Mucous membranes are moist.   Eyes: General: No scleral icterus. Conjunctiva/sclera: Conjunctivae normal.      Pupils: Pupils are equal, round, and reactive to light. Neck:      Musculoskeletal: Neck supple. Trachea: No tracheal deviation. Cardiovascular:      Rate and Rhythm: Normal rate and regular rhythm. Pulmonary:      Effort: Pulmonary effort is normal. No respiratory distress. Breath sounds: No wheezing or rales. Abdominal:      General: There is no distension. Palpations: Abdomen is soft. Tenderness: There is no abdominal tenderness. There is no guarding or rebound. Genitourinary:     Comments: deferred  Musculoskeletal:         General: No deformity. Skin:     General: Skin is warm and dry. Neurological:      General: No focal deficit present. Mental Status: She is alert. Psychiatric:         Mood and Affect: Mood normal.          MDM       Procedures        Wayne Iraheta was evaluated in the Emergency Department on 6/8/2020 for the symptoms described in the history of present illness. He/she was evaluated in the context of the global COVID-19 pandemic, which necessitated consideration that the patient might be at risk for infection with the SARS-CoV-2 virus that causes COVID-19. Institutional protocols and algorithms that pertain to the evaluation of patients at risk for COVID-19 are in a state of rapid change based on information released by regulatory bodies including the CDC and federal and state organizations. These policies and algorithms were followed during the patient's care in the ED.     Surrogate Decision Maker (Who do you want to make decisions for you in the event you are not able to?): Extended Emergency Contact Information  Primary Emergency Contact: [address] [place] [address]  Mobile Phone: [phone]  Relation: Daughter  Secondary Emergency Contact: [facility] Phone: [phone]  Relation: Child    Ventilation (Do you want to be intubated and mechanically ventilated?):  Yes    CPR (Do you want chest compressions and electricity in an attempt to restart your heart?): Yes       1:44 PM  Patient re-evaluated. All questions answered. Patient appropriate for discharge. Given return precautions and follow up instructions. LABORATORY TESTS:  Labs Reviewed   CBC WITH AUTOMATED DIFF - Abnormal; Notable for the following components:       Result Value    NEUTROPHILS 84 (*)     LYMPHOCYTES 9 (*)     IMMATURE GRANULOCYTES 1 (*)     ABS. NEUTROPHILS 9.2 (*)     ABS. IMM. GRANS. 0.1 (*)     All other components within normal limits   METABOLIC PANEL, COMPREHENSIVE - Abnormal; Notable for the following components:    Sodium 134 (*)     Potassium 3.4 (*)     Glucose 173 (*)     Creatinine 1.04 (*)     GFR est non-AA 54 (*)     Albumin 3.3 (*)     Globulin 4.4 (*)     A-G Ratio 0.8 (*)     All other components within normal limits   URINALYSIS W/MICROSCOPIC - Abnormal; Notable for the following components:    Protein 100 (*)     Blood TRACE (*)     Epithelial cells MODERATE (*)     Bacteria 3+ (*)     Mucus 2+ (*)     All other components within normal limits   URINE CULTURE HOLD SAMPLE   SAMPLES BEING HELD   BILIRUBIN, CONFIRM   SARS-COV-2       IMAGING RESULTS:  XR CHEST PORT   Final Result   IMPRESSION: No acute cardiopulmonary disease. MEDICATIONS GIVEN:  Medications   sodium chloride 0.9 % bolus infusion 1,000 mL (1,000 mL IntraVENous New Bag 6/8/20 1226)       IMPRESSION:  1. Viral syndrome        PLAN:  1. Current Discharge Medication List        2.    Follow-up Information     Follow up With Specialties Details Why Contact Info    Len Jaime MD Family Practice Schedule an appointment as soon as possible for a visit  [address] [place] [address] DEPT Emergency Medicine  If symptoms worsen or new concerns [facility] [address]  [phone] 3.     Return to ED for new or worsening symptoms       Benjamin Frias MD

## 2020-06-09 ENCOUNTER — PATIENT OUTREACH (OUTPATIENT)
Dept: FAMILY MEDICINE CLINIC | Age: 60
End: 2020-06-09

## 2020-06-10 LAB
SARS-COV-2, COV2NT: NOT DETECTED
SPECIMEN SOURCE, FCOV2M: NORMAL

## 2020-06-16 ENCOUNTER — PATIENT OUTREACH (OUTPATIENT)
Dept: FAMILY MEDICINE CLINIC | Age: 60
End: 2020-06-16

## 2020-06-16 NOTE — PROGRESS NOTES
Patient contacted regarding COVID-19 risk and screening. Discussed COVID-19 related testing which was available at this time. Test results were negative. Patient informed of results, if available? yes     Care Transition Nurse/ Ambulatory Care Manager contacted the patient by telephone to perform follow-up assessment. Verified name and  with patient as identifiers. Patient has following risk factors of: diabetes. Symptoms reviewed with patient who verbalized the following symptoms: no new symptoms and no worsening symptoms. Due to no new or worsening symptoms encounter was not routed to provider for escalation. Education provided regarding infection prevention, and signs and symptoms of COVID-19 and when to seek medical attention with patient who verbalized understanding. Discussed exposure protocols and quarantine from [address] you at higher risk for severe illness  and given an opportunity for questions and concerns. The patient agrees to contact the COVID-19 hotline [phone] or PCP office for questions related to their healthcare. CTN/ACM provided contact information for future reference. From CDC: Are you at higher risk for severe illness?  Wash your hands often.  Avoid close contact (6 feet, which is about two arm lengths) with people who are sick.  Put distance between yourself and other people if COVID-19 is spreading in your community.  Clean and disinfect frequently touched surfaces.  Avoid all cruise travel and non-essential air travel.  Call your healthcare professional if you have concerns about COVID-19 and your underlying condition or if you are sick. For more information on steps you can take to protect yourself, see CDC's How to Trung for follow-up call in 7-14 days based on severity of symptoms and risk factors.

## 2020-06-22 ENCOUNTER — OFFICE VISIT (OUTPATIENT)
Dept: FAMILY MEDICINE CLINIC | Age: 60
End: 2020-06-22

## 2020-06-22 VITALS
HEART RATE: 62 BPM | SYSTOLIC BLOOD PRESSURE: 132 MMHG | DIASTOLIC BLOOD PRESSURE: 60 MMHG | TEMPERATURE: 98 F | OXYGEN SATURATION: 96 % | WEIGHT: 231.8 LBS | HEIGHT: 67 IN | BODY MASS INDEX: 36.38 KG/M2 | RESPIRATION RATE: 20 BRPM

## 2020-06-22 DIAGNOSIS — Z79.899 ENCOUNTER FOR LONG-TERM CURRENT USE OF MEDICATION: ICD-10-CM

## 2020-06-22 DIAGNOSIS — Z12.11 SCREENING FOR COLON CANCER: ICD-10-CM

## 2020-06-22 DIAGNOSIS — E04.9 THYROID GOITER: ICD-10-CM

## 2020-06-22 DIAGNOSIS — Z12.39 SCREENING FOR MALIGNANT NEOPLASM OF BREAST: ICD-10-CM

## 2020-06-22 DIAGNOSIS — E11.21 TYPE 2 DIABETES WITH NEPHROPATHY (HCC): ICD-10-CM

## 2020-06-22 DIAGNOSIS — D22.9 CHANGE IN MOLE: ICD-10-CM

## 2020-06-22 DIAGNOSIS — Z01.419 WELL WOMAN EXAM WITH ROUTINE GYNECOLOGICAL EXAM: Primary | ICD-10-CM

## 2020-06-22 DIAGNOSIS — E66.01 SEVERE OBESITY (HCC): ICD-10-CM

## 2020-06-22 DIAGNOSIS — E55.9 VITAMIN D DEFICIENCY: ICD-10-CM

## 2020-06-22 DIAGNOSIS — E78.00 HYPERCHOLESTEROLEMIA: ICD-10-CM

## 2020-06-22 DIAGNOSIS — Z12.4 SCREENING FOR MALIGNANT NEOPLASM OF CERVIX: ICD-10-CM

## 2020-06-22 RX ORDER — PREDNISOLONE ACETATE 10 MG/ML
SUSPENSION/ DROPS OPHTHALMIC
COMMUNITY
Start: 2020-04-14 | End: 2022-06-22

## 2020-06-22 RX ORDER — CLINDAMYCIN HYDROCHLORIDE 150 MG/1
150 CAPSULE ORAL 4 TIMES DAILY
COMMUNITY
Start: 2020-06-16 | End: 2021-04-16 | Stop reason: ALTCHOICE

## 2020-06-22 NOTE — PROGRESS NOTES
Identified pt with two pt identifiers(name and ). Chief Complaint   Patient presents with   [address] Follow Up     2020 fever, diarrhea COVID neg        Health Maintenance Due   Topic    Shingrix Vaccine Age 50> (1 of 2)    Breast Cancer Screen Mammogram     PAP AKA CERVICAL CYTOLOGY        Wt Readings from Last 3 Encounters:   20 231 lb 12.8 oz (105.1 kg)   20 223 lb 15.8 oz (101.6 kg)   19 220 lb (99.8 kg)     Temp Readings from Last 3 Encounters:   20 98 °F (36.7 °C) (Oral)   20 100 °F (37.8 °C)   19 98.2 °F (36.8 °C) (Oral)     BP Readings from Last 3 Encounters:   20 132/60   20 134/67   19 136/72     Pulse Readings from Last 3 Encounters:   20 62   20 80   19 77         Learning Assessment:  :     Learning Assessment 2017   PRIMARY LEARNER Patient Patient   HIGHEST LEVEL OF EDUCATION - PRIMARY LEARNER  - 2 YEARS OF COLLEGE   BARRIERS PRIMARY LEARNER - NONE   CO-LEARNER CAREGIVER - No   PRIMARY LANGUAGE ENGLISH ENGLISH   LEARNER PREFERENCE PRIMARY DEMONSTRATION DEMONSTRATION   ANSWERED BY patient self   RELATIONSHIP SELF SELF       Depression Screening:  :     3 most recent PHQ Screens 2020   Little interest or pleasure in doing things Not at all   Feeling down, depressed, irritable, or hopeless Not at all   Total Score PHQ 2 0       Fall Risk Assessment:  :     Fall Risk Assessment, last 12 mths 2020   Able to walk? Yes   Fall in past 12 months? No   Fall with injury? -   Number of falls in past 12 months -   Fall Risk Score -       Abuse Screening:  :     Abuse Screening Questionnaire 2020 2020 2019 2018 10/18/2017 2017 2016   Do you ever feel afraid of your partner? N N N N N N N   Are you in a relationship with someone who physically or mentally threatens you? N N N N N N N   Is it safe for you to go home?  Ania Peña       Coordination of Care Questionnaire:  :     1) Have you been to an emergency room, urgent care clinic since your last visit? yes 6/8/2020 ED   Hospitalized since your last visit? no             2) Have you seen or consulted any other health care providers outside of [address] [place] [address] since your last visit? yes [facility] (Include any pap smears or colon screenings in this section.)    3) Do you have an Advance Directive on file? no  Are you interested in receiving information about Advance Directives? no    Reviewed record in preparation for visit and have obtained necessary documentation. Medication reconciliation up to date and corrected with patient at this time.

## 2020-06-22 NOTE — PROGRESS NOTES
Subjective:   56 y.o. female for Well Woman Check. She is postmenopausal.  Social History: single partner, contraception - status post hysterectomy. Pertinent past medical hstory: hypertension, DM. Patient Active Problem List    Diagnosis Date Noted    Uncontrolled type 2 diabetes mellitus with diabetic cataract, with long-term current use of insulin ([facility] 05/15/2015     Priority: 1 - One    Severe obesity ([facility] 06/22/2020    Type 2 diabetes with nephropathy ([facility] 07/22/2019    CKD (chronic kidney disease) stage 3, GFR 30-59 ml/min ([facility] 07/22/2019    Encounter for long-term current use of medication 04/05/2019    Nuclear cataract 07/12/2016    Open angle with borderline findings and low glaucoma risk in both eyes 07/12/2016    Osteopenia 05/15/2015    Hypercholesterolemia 04/28/2013    Thyroid goiter 04/19/2013    GERD (gastroesophageal reflux disease) 06/22/2012    Iritis 12/23/2011    DDD (degenerative disc disease), cervical 12/23/2011    Vitamin D deficiency 12/23/2011    Sleep apnea 11/08/2010    Heart murmur 11/08/2010     Current Outpatient Medications   Medication Sig Dispense Refill    clindamycin (CLEOCIN) 150 mg capsule Take 150 mg by mouth four (4) times daily.  prednisoLONE acetate (PRED FORTE) 1 % ophthalmic suspension INSTILL 1 DROP INTO RIGHT EYE 4 TIMES DAILY AS DIRECTED      insulin glargine U-300 conc (T[employer] SoloStar U-300 Insulin) 300 unit/mL (1.5 mL) inpn pen 140 Units by SubCUTAneous route daily. Need office visit 14 mL 5    losartan (COZAAR) 25 mg tablet TAKE 1 TABLET BY MOUTH ONCE DAILY 90 Tab 1    pravastatin (PRAVACHOL) 40 mg tablet TAKE ONE TABLET BY MOUTH NIGHTLY. 90 Tab 1    glipiZIDE (GLUCOTROL) 10 mg tablet Take 1 Tab by mouth two (2) times a day. Need office visit. 60 Tab 0    glucose blood VI test strips (ONETOUCH ULTRA BLUE TEST STRIP) strip Test sugar 4 times a day.   DX E11.65 100 Strip 5    VITAMIN D2 50,000 unit capsule TAKE ONE CAPSULE BY MOUTH TWICE A WEEK 9 Cap 12    omeprazole (PRILOSEC) 20 mg capsule TAKE ONE CAPSULE BY MOUTH ONCE DAILY 30 Cap 5    BD AUTOSHIELD DUO PEN NEEDLE 30 gauge x 3/16\" ndle       Lancets misc Accu Check Siomara Smart View Fast Click Drum. DX E11.9 Test blood sugar twice daily 200 Each 3    Blood-Glucose Meter monitoring kit ONE TOUCH ULTRA. Test once a day. .02 1 kit 0    aspirin delayed-release 81 mg tablet Take 81 mg by mouth daily.  cyclobenzaprine (FLEXERIL) 10 mg tablet TAKE ONE TABLET BY MOUTH NIGHTLY 90 Tab 0     Allergies   Allergen Reactions    Lisinopril Cough    Penicillin G Swelling     Past Medical History:   Diagnosis Date    Arthritis     GERD (gastroesophageal reflux disease)     Heart murmur 11/8/2010    Sleep apnea 11/8/2010     Past Surgical History:   Procedure Laterality Date    HX HEENT      uvuloplasty    HX HYSTERECTOMY      HX ORTHOPAEDIC Left     rotator cuff     Family History   Problem Relation Age of Onset    Hypertension Mother     Diabetes Father     Heart Disease Father     Diabetes Maternal Grandmother     Diabetes Paternal Grandmother      Social History     Tobacco Use    Smoking status: Never Smoker    Smokeless tobacco: Never Used   Substance Use Topics    Alcohol use: Yes     Comment: ocassional        ROS:  Feeling well. No dyspnea or chest pain on exertion. No abdominal pain, change in bowel habits, black or bloody stools. No urinary tract symptoms. GYN ROS: no breast pain or new or enlarging lumps on self exam, no vaginal bleeding, no discharge or pelvic pain. Menopausal symptoms: none. No neurological complaints. Objective:     Visit Vitals  /60 (BP 1 Location: Right arm, BP Patient Position: Sitting) Comment: manual   Pulse 62   Temp 98 °F (36.7 °C) (Oral)   Resp 20   Ht 5' 7\" (1.702 m)   Wt 231 lb 12.8 oz (105.1 kg)   LMP 10/14/1987   SpO2 96%   BMI 36.31 kg/m²     The patient appears well, alert, oriented x 3, in no distress.   ENT normal.  Neck supple. No adenopathy or thyromegaly. ANDREW. Lungs are clear, good air entry, no wheezes, rhonchi or rales. S1 and S2 normal, no murmurs, regular rate and rhythm. Abdomen soft without tenderness, guarding, mass or organomegaly. Extremities show no edema, normal peripheral pulses. Neurological is normal, no focal findings. BREAST EXAM: breasts appear normal, no suspicious masses, no skin or nipple changes or axillary nodes    PELVIC EXAM: post hysterectomy    Assessment/Plan:   well woman  postmenopausal  mammogram  additional lab tests per orders  return annually or prn  screening colonoscopy referral written    ICD-10-CM ICD-9-CM    1. Well woman exam with routine gynecological exam Z01.419 V72.31     [V72.31]   2. Screening for malignant neoplasm of cervix Z12.4 V76.2    3. Screening for malignant neoplasm of breast Z12.39 V76.10 JAYCO[facility] MAMMO BI SCREENING INCL CAD   4. Uncontrolled type 2 diabetes mellitus with diabetic cataract, with long-term current use of insulin (HCC) E11.36 250.52     Z79.4 366.41     E11.65 V58.67    5. Type 2 diabetes with nephropathy (HCC) E11.21 250.40      583.81    6. Encounter for long-term current use of medication Z79.899 V58.69    7. Hypercholesterolemia E78.00 272.0    8. Thyroid goiter E04.9 240.9    9. Vitamin D deficiency E55.9 268.9    10. Change in mole D22.9 216.9 REFERRAL TO DERMATOLOGY   11. Screening for colon cancer Z12.11 V76.51 REFERRAL TO GASTROENTEROLOGY   12. Severe obesity ([facility] E66.01 278.01    . Complete labs done in May 2020. FU in 6 months. Order mammogram  Refer for colonoscopy and DERM for skin tags.

## 2020-06-22 NOTE — PATIENT INSTRUCTIONS
Diabetic Retinopathy: Care Instructions Your Care Instructions Diabetes can damage the small blood vessels in part of your eye. This part of the eye is called the retina. It detects light that enters the eye. Then it sends signals to your brain about what the eye sees. When this type of eye damage happens, it's called diabetic retinopathy. It can lead to poor vision and even blindness. But if you keep your blood sugar and blood pressure levels in your target range, you can help avoid or slow the damage. Follow-up care is a key part of your treatment and safety. Be sure to make and go to all appointments, and call your doctor if you are having problems. It's also a good idea to know your test results and keep a list of the medicines you take. How can you care for yourself at home? · Have regular eye exams. Tell your doctor about any changes in your vision. · Keep blood sugar in your target range. ? Eat a variety of healthy foods, and spread carbohydrate throughout the day. You may want to have a dietitian help you plan meals. ? If your doctor recommends it, get more exercise. Walking is a good choice. Bit by bit, increase the amount you walk every day. Try for at least 30 minutes on most days of the week. ? Be safe with medicines. Take your medicine exactly as prescribed. Call your doctor if you think you are having a problem with your medicine. ? Check your blood sugar as often as your doctor recommends. · Eat a low-salt diet. This may help keep your blood pressure at a normal level. You may also need to take medicines to reach your goals. · Do not smoke. Smoking can make this condition worse. If you need help quitting, talk to your doctor about stop-smoking programs and medicines. These can increase your chances of quitting for good. · Avoid risky activities. These include things like weight lifting and some contact sports.  They may trigger bleeding in the eye through impact or increased pressure. When should you call for help? Call your doctor now or seek immediate medical care if: 
· You have vision changes. Watch closely for changes in your health, and be sure to contact your doctor if: 
· You do not get better as expected. Where can you learn more? Go to http://eulogio-lior.info/ Enter R396 in the search box to learn more about \"Diabetic Retinopathy: Care Instructions. \" Current as of: December 20, 2019               Content Version: 12.5 © 3534-5573 [employer]. Care instructions adapted under license by [employer] (which disclaims liability or warranty for this information). If you have questions about a medical condition or this instruction, always ask your healthcare professional. [address] any warranty or liability for your use of this information.

## 2020-06-30 ENCOUNTER — PATIENT OUTREACH (OUTPATIENT)
Dept: FAMILY MEDICINE CLINIC | Age: 60
End: 2020-06-30

## 2020-06-30 NOTE — PROGRESS NOTES
Patient resolved from Transition of Care episode on 6/30/20. ACM/CTN was unsuccessful at contacting this patient today. Patient/family was provided the following resources and education related to COVID-19 during the initial call:                         Signs, symptoms and red flags related to COVID-19            CDC exposure and quarantine guidelines            Conduit exposure contact - [phone]            Contact for their local Department of Health                 Patient has not had any additional ED or hospital visits. No further outreach scheduled with this CTN/ACM. Episode of Care resolved. Patient has this CTN/ACM contact information if future needs arise.

## 2020-07-06 RX ORDER — GLIPIZIDE 10 MG/1
10 TABLET ORAL 2 TIMES DAILY
Qty: 60 TAB | Refills: 0 | Status: CANCELLED | OUTPATIENT
Start: 2020-07-06

## 2020-07-07 RX ORDER — GLIPIZIDE 10 MG/1
TABLET ORAL
Qty: 60 TAB | Refills: 5 | Status: SHIPPED | OUTPATIENT
Start: 2020-07-07 | End: 2020-10-24

## 2020-10-24 DIAGNOSIS — E78.00 HYPERCHOLESTEROLEMIA: ICD-10-CM

## 2020-10-24 RX ORDER — GLIPIZIDE 10 MG/1
TABLET ORAL
Qty: 120 TAB | Refills: 5 | Status: SHIPPED | OUTPATIENT
Start: 2020-10-24 | End: 2021-10-25

## 2020-10-24 RX ORDER — PRAVASTATIN SODIUM 40 MG/1
TABLET ORAL
Qty: 90 TAB | Refills: 1 | Status: SHIPPED | OUTPATIENT
Start: 2020-10-24 | End: 2021-01-28

## 2020-11-08 RX ORDER — ERGOCALCIFEROL 1.25 MG/1
CAPSULE ORAL
Qty: 9 CAP | Refills: 5 | Status: SHIPPED | OUTPATIENT
Start: 2020-11-08 | End: 2021-08-15

## 2020-12-06 RX ORDER — LOSARTAN POTASSIUM 25 MG/1
TABLET ORAL
Qty: 30 TAB | Refills: 0 | Status: SHIPPED | OUTPATIENT
Start: 2020-12-06 | End: 2021-01-28

## 2020-12-06 RX ORDER — INSULIN GLARGINE 300 U/ML
INJECTION, SOLUTION SUBCUTANEOUS
Qty: 13.5 ML | Refills: 0 | Status: SHIPPED | OUTPATIENT
Start: 2020-12-06 | End: 2021-04-26

## 2020-12-21 ENCOUNTER — NURSE TRIAGE (OUTPATIENT)
Dept: OTHER | Facility: CLINIC | Age: 60
End: 2020-12-21

## 2020-12-21 NOTE — TELEPHONE ENCOUNTER
Reason for Disposition   Patient wants to be seen    Answer Assessment - Initial Assessment Questions  1. ONSET: \"When did the cough begin? \"       2 days ago    2. SEVERITY: \"How bad is the cough today? \"       Mild during the day and worse at night    3. RESPIRATORY DISTRESS: \"Describe your breathing. \"       Wheezing at night, normal currently    4. FEVER: \"Do you have a fever? \" If so, ask: \"What is your temperature, how was it measured, and when did it start? \"      No    5. HEMOPTYSIS: \"Are you coughing up any blood? \" If so ask: \"How much? \" (flecks, streaks, tablespoons, etc.)      No    6. TREATMENT: \"What have you done so far to treat the cough? \" (e.g., meds, fluids, humidifier)      Fluids    7. CARDIAC HISTORY: \"Do you have any history of heart disease? \" (e.g., heart attack, congestive heart failure)       No    8. LUNG HISTORY: \"Do you have any history of lung disease? \"  (e.g., pulmonary embolus, asthma, emphysema)      No    9. PE RISK FACTORS: \"Do you have a history of blood clots? \" (or: recent major surgery, recent prolonged travel, bedridden)      No    10. OTHER SYMPTOMS: \"Do you have any other symptoms? (e.g., runny nose, wheezing, chest pain)        Mild HA    11. PREGNANCY: \"Is there any chance you are pregnant? \" \"When was your last menstrual period? \"        No    12. TRAVEL: \"Have you traveled out of the country in the last month? \" (e.g., travel history, exposures)        No    Protocols used: COUGH-ADULT-OH    Brief description of triage: See above    Triage indicates for patient to be seen today or tomorrow in the office. Care advice provided, patient verbalizes understanding; denies any other questions or concerns; instructed to call back for any new or worsening symptoms. Writer provided warm transfer to Rachel Austin at [facility] for appointment scheduling. Attention Provider: Thank you for allowing me to participate in the care of your patient.  The patient was connected to triage in response to information provided to the [facility]. Please do not respond through this encounter as the response is not directed to a shared pool.

## 2021-02-26 ENCOUNTER — HOSPITAL ENCOUNTER (OUTPATIENT)
Dept: MAMMOGRAPHY | Age: 61
Discharge: HOME | End: 2021-02-26
Attending: FAMILY MEDICINE
Payer: COMMERCIAL

## 2021-02-26 DIAGNOSIS — Z12.39 SCREENING FOR MALIGNANT NEOPLASM OF BREAST: ICD-10-CM

## 2021-02-26 PROCEDURE — 77067 SCR MAMMO BI INCL CAD: CPT

## 2021-04-16 ENCOUNTER — OFFICE VISIT (OUTPATIENT)
Dept: FAMILY MEDICINE CLINIC | Age: 61
End: 2021-04-16
Payer: COMMERCIAL

## 2021-04-16 VITALS
HEART RATE: 66 BPM | SYSTOLIC BLOOD PRESSURE: 129 MMHG | DIASTOLIC BLOOD PRESSURE: 75 MMHG | OXYGEN SATURATION: 96 % | RESPIRATION RATE: 18 BRPM | BODY MASS INDEX: 36.57 KG/M2 | WEIGHT: 233 LBS | HEIGHT: 67 IN | TEMPERATURE: 97.5 F

## 2021-04-16 DIAGNOSIS — M50.30 DEGENERATIVE DISC DISEASE, CERVICAL: ICD-10-CM

## 2021-04-16 DIAGNOSIS — E04.9 THYROID GOITER: ICD-10-CM

## 2021-04-16 DIAGNOSIS — E66.01 SEVERE OBESITY (HCC): ICD-10-CM

## 2021-04-16 DIAGNOSIS — E11.65 UNCONTROLLED TYPE 2 DIABETES MELLITUS WITH HYPERGLYCEMIA (HCC): ICD-10-CM

## 2021-04-16 DIAGNOSIS — M54.50 ACUTE BILATERAL LOW BACK PAIN WITHOUT SCIATICA: ICD-10-CM

## 2021-04-16 DIAGNOSIS — Z79.899 ENCOUNTER FOR LONG-TERM CURRENT USE OF MEDICATION: ICD-10-CM

## 2021-04-16 DIAGNOSIS — E78.00 HYPERCHOLESTEROLEMIA: ICD-10-CM

## 2021-04-16 DIAGNOSIS — E11.21 TYPE 2 DIABETES WITH NEPHROPATHY (HCC): Primary | ICD-10-CM

## 2021-04-16 DIAGNOSIS — E55.9 VITAMIN D DEFICIENCY: ICD-10-CM

## 2021-04-16 LAB
BILIRUB UR QL STRIP: NEGATIVE
GLUCOSE UR-MCNC: NEGATIVE MG/DL
KETONES P FAST UR STRIP-MCNC: NEGATIVE MG/DL
PH UR STRIP: 6 [PH] (ref 4.6–8)
PROT UR QL STRIP: NEGATIVE
SP GR UR STRIP: 1.02 (ref 1–1.03)
UA UROBILINOGEN AMB POC: NORMAL (ref 0.2–1)
URINALYSIS CLARITY POC: CLEAR
URINALYSIS COLOR POC: YELLOW
URINE BLOOD POC: NEGATIVE
URINE LEUKOCYTES POC: NEGATIVE
URINE NITRITES POC: NEGATIVE

## 2021-04-16 PROCEDURE — 3052F HG A1C>EQUAL 8.0%<EQUAL 9.0%: CPT | Performed by: FAMILY MEDICINE

## 2021-04-16 PROCEDURE — 81003 URINALYSIS AUTO W/O SCOPE: CPT | Performed by: FAMILY MEDICINE

## 2021-04-16 PROCEDURE — 99214 OFFICE O/P EST MOD 30 MIN: CPT | Performed by: FAMILY MEDICINE

## 2021-04-16 RX ORDER — CYCLOBENZAPRINE HCL 5 MG
5 TABLET ORAL
Qty: 90 TAB | Refills: 3 | Status: SHIPPED | OUTPATIENT
Start: 2021-04-16 | End: 2022-06-22 | Stop reason: SDUPTHER

## 2021-04-16 NOTE — PROGRESS NOTES
Identified pt with two pt identifiers(name and ). Chief Complaint   Patient presents with    Diabetes    LOW BACK PAIN     would like her kidneys checked    Labs     NPO since midnight    Medication Refill     of all meds        Health Maintenance Due   Topic    Shingrix Vaccine Age 50> (1 of 2)    MICROALBUMIN Q1        Wt Readings from Last 3 Encounters:   21 233 lb (105.7 kg)   20 231 lb 12.8 oz (105.1 kg)   20 223 lb 15.8 oz (101.6 kg)     Temp Readings from Last 3 Encounters:   21 97.5 °F (36.4 °C) (Temporal)   20 98 °F (36.7 °C) (Oral)   20 100 °F (37.8 °C)     BP Readings from Last 3 Encounters:   21 129/75   20 132/60   20 134/67     Pulse Readings from Last 3 Encounters:   21 66   20 62   20 80         Learning Assessment:  :     Learning Assessment 2017   PRIMARY LEARNER Patient Patient   HIGHEST LEVEL OF EDUCATION - PRIMARY LEARNER  - 2 YEARS OF COLLEGE   BARRIERS PRIMARY LEARNER - NONE   CO-LEARNER CAREGIVER - No   PRIMARY LANGUAGE ENGLISH ENGLISH   LEARNER PREFERENCE PRIMARY DEMONSTRATION DEMONSTRATION   ANSWERED BY patient self   RELATIONSHIP SELF SELF       Depression Screening:  :     3 most recent PHQ Screens 2020   Little interest or pleasure in doing things Not at all   Feeling down, depressed, irritable, or hopeless Not at all   Total Score PHQ 2 0       Fall Risk Assessment:  :     Fall Risk Assessment, last 12 mths 2020   Able to walk? Yes   Fall in past 12 months? No   Number of falls in past 12 months -   Fall with injury? -       Abuse Screening:  :     Abuse Screening Questionnaire 2020 2020 2019 2018 10/18/2017 2017 2016   Do you ever feel afraid of your partner? N N N N N N N   Are you in a relationship with someone who physically or mentally threatens you? N N N N N N N   Is it safe for you to go home?  Nickolas Baig         Coordination of Care Questionnaire:  :     1) Have you been to an emergency room, urgent care clinic since your last visit? no   Hospitalized since your last visit? no             2) Have you seen or consulted any other health care providers outside of [address] [place] [address] since your last visit? no        3) Do you have an Advance Directive on file? no  Are you interested in receiving information about Advance Directives? no    Patient is accompanied by self. Reviewed record in preparation for visit and have obtained necessary documentation. Medication reconciliation up to date and corrected with patient at this time. Order for POC U/A Testing placed per Dr. Sandee Street' verbal order.

## 2021-04-16 NOTE — PATIENT INSTRUCTIONS
Neck Arthritis: Exercises  Introduction  Here are some examples of exercises for you to try. The exercises may be suggested for a condition or for rehabilitation. Start each exercise slowly. Ease off the exercises if you start to have pain. You will be told when to start these exercises and which ones will work best for you. How to do the exercises  Neck stretches to the side   1. This stretch works best if you keep your shoulder down as you lean away from it. To help you remember to do this, start by relaxing your shoulders and lightly holding on to your thighs or your chair. 2. Tilt your head toward your shoulder and hold for 15 to 30 seconds. Let the weight of your head stretch your muscles. 3. Repeat 2 to 4 times toward each shoulder. Chin tuck   1. Lie on the floor with a rolled-up towel under your neck. Your head should be touching the floor. 2. Slowly bring your chin toward your chest.  3. Hold for a count of 6, and then relax for up to 10 seconds. 4. Repeat 8 to 12 times. Active cervical rotation   1. Sit in a firm chair, or stand up straight. 2. Keeping your chin level, turn your head to the right, and hold for 15 to 30 seconds. 3. Turn your head to the left and hold for 15 to 30 seconds. 4. Repeat 2 to 4 times to each side. Shoulder blade squeeze   1. While standing, squeeze your shoulder blades together. 2. Do not raise your shoulders up as you are squeezing. 3. Hold for 6 seconds. 4. Repeat 8 to 12 times. Shoulder rolls   1. Sit comfortably with your feet shoulder-width apart. You can also do this exercise standing up. 2. Roll your shoulders up, then back, and then down in a smooth, circular motion. 3. Repeat 2 to 4 times. Follow-up care is a key part of your treatment and safety. Be sure to make and go to all appointments, and call your doctor if you are having problems. It's also a good idea to know your test results and keep a list of the medicines you take.   Where can you learn more? Go to http://www.gray.com/  Enter K635 in the search box to learn more about \"Neck Arthritis: Exercises. \"  Current as of: November 16, 2020               Content Version: 12.8  © 2497-0798 [employer]. Care instructions adapted under license by [employer] (which disclaims liability or warranty for this information). If you have questions about a medical condition or this instruction, always ask your healthcare professional. [place] [address] any warranty or liability for your use of this information.

## 2021-04-17 LAB
25(OH)D3 SERPL-MCNC: 50.6 NG/ML (ref 30–100)
ALBUMIN SERPL-MCNC: 3.7 G/DL (ref 3.5–5)
ALBUMIN/GLOB SERPL: 1.1 {RATIO} (ref 1.1–2.2)
ALP SERPL-CCNC: 86 U/L (ref 45–117)
ALT SERPL-CCNC: 26 U/L (ref 12–78)
ANION GAP SERPL CALC-SCNC: 8 MMOL/L (ref 5–15)
AST SERPL-CCNC: 12 U/L (ref 15–37)
BASOPHILS # BLD: 0 K/UL (ref 0–0.1)
BASOPHILS NFR BLD: 1 % (ref 0–1)
BILIRUB SERPL-MCNC: 0.3 MG/DL (ref 0.2–1)
BUN SERPL-MCNC: 18 MG/DL (ref 6–20)
BUN/CREAT SERPL: 22 (ref 12–20)
CALCIUM SERPL-MCNC: 9.1 MG/DL (ref 8.5–10.1)
CHLORIDE SERPL-SCNC: 105 MMOL/L (ref 97–108)
CHOLEST SERPL-MCNC: 154 MG/DL
CO2 SERPL-SCNC: 29 MMOL/L (ref 21–32)
CREAT SERPL-MCNC: 0.83 MG/DL (ref 0.55–1.02)
CREAT UR-MCNC: 138 MG/DL
DIFFERENTIAL METHOD BLD: NORMAL
EOSINOPHIL # BLD: 0.1 K/UL (ref 0–0.4)
EOSINOPHIL NFR BLD: 2 % (ref 0–7)
ERYTHROCYTE [DISTWIDTH] IN BLOOD BY AUTOMATED COUNT: 12.6 % (ref 11.5–14.5)
EST. AVERAGE GLUCOSE BLD GHB EST-MCNC: 197 MG/DL
GLOBULIN SER CALC-MCNC: 3.3 G/DL (ref 2–4)
GLUCOSE SERPL-MCNC: 97 MG/DL (ref 65–100)
HBA1C MFR BLD: 8.5 % (ref 4–5.6)
HCT VFR BLD AUTO: 40.1 % (ref 35–47)
HDLC SERPL-MCNC: 64 MG/DL
HDLC SERPL: 2.4 {RATIO} (ref 0–5)
HGB BLD-MCNC: 12.6 G/DL (ref 11.5–16)
IMM GRANULOCYTES # BLD AUTO: 0 K/UL (ref 0–0.04)
IMM GRANULOCYTES NFR BLD AUTO: 0 % (ref 0–0.5)
LDLC SERPL CALC-MCNC: 75.6 MG/DL (ref 0–100)
LIPID PROFILE,FLP: NORMAL
LYMPHOCYTES # BLD: 1.8 K/UL (ref 0.8–3.5)
LYMPHOCYTES NFR BLD: 31 % (ref 12–49)
MCH RBC QN AUTO: 27.4 PG (ref 26–34)
MCHC RBC AUTO-ENTMCNC: 31.4 G/DL (ref 30–36.5)
MCV RBC AUTO: 87.2 FL (ref 80–99)
MICROALBUMIN UR-MCNC: 1.16 MG/DL
MICROALBUMIN/CREAT UR-RTO: 8 MG/G (ref 0–30)
MONOCYTES # BLD: 0.4 K/UL (ref 0–1)
MONOCYTES NFR BLD: 7 % (ref 5–13)
NEUTS SEG # BLD: 3.4 K/UL (ref 1.8–8)
NEUTS SEG NFR BLD: 59 % (ref 32–75)
NRBC # BLD: 0 K/UL (ref 0–0.01)
NRBC BLD-RTO: 0 PER 100 WBC
PLATELET # BLD AUTO: 325 K/UL (ref 150–400)
PMV BLD AUTO: 11 FL (ref 8.9–12.9)
POTASSIUM SERPL-SCNC: 4.4 MMOL/L (ref 3.5–5.1)
PROT SERPL-MCNC: 7 G/DL (ref 6.4–8.2)
RBC # BLD AUTO: 4.6 M/UL (ref 3.8–5.2)
SODIUM SERPL-SCNC: 142 MMOL/L (ref 136–145)
T4 FREE SERPL-MCNC: 0.9 NG/DL (ref 0.8–1.5)
TRIGL SERPL-MCNC: 72 MG/DL (ref ?–150)
TSH SERPL DL<=0.05 MIU/L-ACNC: 1.18 UIU/ML (ref 0.36–3.74)
VLDLC SERPL CALC-MCNC: 14.4 MG/DL
WBC # BLD AUTO: 5.7 K/UL (ref 3.6–11)

## 2021-04-17 NOTE — PROGRESS NOTES
Subjective: Liliya Donahue is a 59 y.o. female seen for follow up of diabetes. She also has hypertension, hyperlipidemia and obesity. Diabetic Review of Systems - medication compliance: compliant most of the time, diabetic diet compliance: compliant most of the time, home glucose monitoring: is performed regularly, fasting values range 120-150, last eye exam approximately a year ago. Other symptoms and concerns: due for labs. C/O neck pain that radiates to upper back. XR 2011 of c spine showed DDD. Patient Active Problem List    Diagnosis Date Noted    Uncontrolled type 2 diabetes mellitus with diabetic cataract, with long-term current use of insulin ([facility] 05/15/2015     Priority: 1 - One    Severe obesity ([address] 06/22/2020    Type 2 diabetes with nephropathy ([facility] [address] 07/22/2019    CKD (chronic kidney disease) stage 3, GFR 30-59 ml/min ([address] 07/22/2019    Encounter for long-term current use of medication 04/05/2019    Nuclear cataract 07/12/2016    Open angle with borderline findings and low glaucoma risk in both eyes 07/12/2016    Osteopenia 05/15/2015    Hypercholesterolemia 04/28/2013    Thyroid goiter 04/19/2013    GERD (gastroesophageal reflux disease) 06/22/2012    Iritis 12/23/2011    DDD (degenerative disc disease), cervical 12/23/2011    Vitamin D deficiency 12/23/2011    Sleep apnea 11/08/2010    Heart murmur 11/08/2010     Current Outpatient Medications   Medication Sig Dispense Refill    glucose blood VI test strips (OneTouch Ultra Blue Test Strip) strip Test sugar 4 times a day. DX E11.65 100 Strip 5    cyclobenzaprine (FLEXERIL) 5 mg tablet Take 1 Tab by mouth three (3) times daily as needed for Muscle Spasm(s).  Indications: muscle spasm 90 Tab 3    losartan (COZAAR) 25 mg tablet TAKE 1 (ONE) TABLET BY MOUTH ONCE DAILY **DOCTOR WANTS YOU TO MAKE AN APPOINTMENT** 30 Tab 0    pravastatin (PRAVACHOL) 40 mg tablet TAKE ONE TABLET BY MOUTH NIGHTLY. **NEED OFFICE VISIT** 30 Tab 0    Toujeo SoloStar U-300 Insulin 300 unit/mL (1.5 mL) inpn pen INJECT 140 UNITS BY SUBCUTANEOUS ROUTE DAILY. NEED OFFICE VISIT 13.5 mL 0    Vitamin D2 1,250 mcg (50,000 unit) capsule TAKE 1 CAPSULE BY MOUTH TWICE A WEEK 9 Cap 5    glipiZIDE (GLUCOTROL) 10 mg tablet TAKE 1 (ONE) TABLET BY MOUTH TWO TIMES A  Tab 5    omeprazole (PRILOSEC) 20 mg capsule TAKE ONE CAPSULE BY MOUTH ONCE DAILY 30 Cap 5    BD AUTOSHIELD DUO PEN NEEDLE 30 gauge x 3/16\" ndle       Lancets misc Accu Check Siomara Smart View Fast Click Drum. DX E11.9 Test blood sugar twice daily 200 Each 3    Blood-Glucose Meter monitoring kit ONE TOUCH ULTRA. Test once a day. .02 1 kit 0    aspirin delayed-release 81 mg tablet Take 81 mg by mouth daily.  prednisoLONE acetate (PRED FORTE) 1 % ophthalmic suspension INSTILL 1 DROP INTO RIGHT EYE 4 TIMES DAILY AS DIRECTED       Allergies   Allergen Reactions    Lisinopril Cough    Penicillin G Swelling     Past Medical History:   Diagnosis Date    Arthritis     GERD (gastroesophageal reflux disease)     Heart murmur 11/8/2010    Sleep apnea 11/8/2010     Past Surgical History:   Procedure Laterality Date    HX HEENT      uvuloplasty    HX HYSTERECTOMY      HX ORTHOPAEDIC Left     rotator cuff     Family History   Problem Relation Age of Onset    Hypertension Mother     Diabetes Father     Heart Disease Father     Diabetes Maternal Grandmother     Diabetes Paternal Grandmother      Social History     Tobacco Use    Smoking status: Never Smoker    Smokeless tobacco: Never Used   Substance Use Topics    Alcohol use: Yes     Comment: ocassional             Review of Systems  Pertinent items are noted in HPI.     Objective:     Visit Vitals  /75 (BP 1 Location: Left upper arm, BP Patient Position: Sitting, BP Cuff Size: Large adult)   Pulse 66   Temp 97.5 °F (36.4 °C) (Temporal)   Resp 18   Ht 5' 7\" (1.702 m)   Wt 233 lb (105.7 kg)   LMP 10/14/1987   SpO2 96%   BMI 36.49 kg/m²     Appearance: alert, well appearing, and in no distress and overweight. Exam:neck: FROM, tight traps   heart sounds normal rate, regular rhythm, normal S1, S2, no murmurs, rubs, clicks or gallops, chest clear  Lab review: orders written for new lab studies as appropriate; see orders. Diabetic foot exam:     Left Foot:   Visual Exam: bunion   Pulse DP: 2+ (normal)   Filament test: normal sensation          Right Foot:   Visual Exam: bunion   Pulse DP: 2+ (normal)   Filament test: normal sensation          Assessment/Plan:     diabetes , hypertension stable, hyperlipidemia . Diabetic issues reviewed with her: low cholesterol diet, weight control and daily exercise discussed and annual eye examinations at Ophthalmology discussed. ICD-10-CM ICD-9-CM    1. Type 2 diabetes with nephropathy (HCC)  E11.21 250.40 HEMOGLOBIN A1C WITH EAG     583.81 MICROALBUMIN, UR, RAND W/ MICROALB/CREAT RATIO       DIABETES FOOT EXAM      MICROALBUMIN, UR, RAND W/ MICROALB/CREAT RATIO      HEMOGLOBIN A1C WITH EAG   2. Degenerative disc disease, cervical  M50.30 722.4 cyclobenzaprine (FLEXERIL) 5 mg tablet   3. Hypercholesterolemia  E78.00 272.0 LIPID PANEL      LIPID PANEL   4. Encounter for long-term current use of medication  Z79.899 V58.69 CBC WITH AUTOMATED DIFF      METABOLIC PANEL, COMPREHENSIVE      METABOLIC PANEL, COMPREHENSIVE      CBC WITH AUTOMATED DIFF   5. Vitamin D deficiency  E55.9 268.9 VITAMIN D, 25 HYDROXY      VITAMIN D, 25 HYDROXY   6. Acute bilateral low back pain without sciatica  M54.5 724.2 AMB POC URINALYSIS DIP STICK AUTO W/O MICRO     338.19    7. Thyroid goiter  E04.9 240.9 TSH 3RD GENERATION      T4, FREE      T4, FREE      TSH 3RD GENERATION   8. Severe obesity ([address]  E66.01 278.01    9. Uncontrolled type 2 diabetes mellitus with hyperglycemia (HCC)  E11.65 250.02 glucose blood VI test strips (OneTouch Ultra Blue Test Strip) strip     Order labs.   Refill Flexeril PRN for muscle spasm.  Work on weight loss. Increase regular exercise. Remind need to see ophthalmology. Patient Instructions          Neck Arthritis: Exercises  Introduction  Here are some examples of exercises for you to try. The exercises may be suggested for a condition or for rehabilitation. Start each exercise slowly. Ease off the exercises if you start to have pain. You will be told when to start these exercises and which ones will work best for you. How to do the exercises  Neck stretches to the side   1. This stretch works best if you keep your shoulder down as you lean away from it. To help you remember to do this, start by relaxing your shoulders and lightly holding on to your thighs or your chair. 2. Tilt your head toward your shoulder and hold for 15 to 30 seconds. Let the weight of your head stretch your muscles. 3. Repeat 2 to 4 times toward each shoulder. Chin tuck   1. Lie on the floor with a rolled-up towel under your neck. Your head should be touching the floor. 2. Slowly bring your chin toward your chest.  3. Hold for a count of 6, and then relax for up to 10 seconds. 4. Repeat 8 to 12 times. Active cervical rotation   1. Sit in a firm chair, or stand up straight. 2. Keeping your chin level, turn your head to the right, and hold for 15 to 30 seconds. 3. Turn your head to the left and hold for 15 to 30 seconds. 4. Repeat 2 to 4 times to each side. Shoulder blade squeeze   1. While standing, squeeze your shoulder blades together. 2. Do not raise your shoulders up as you are squeezing. 3. Hold for 6 seconds. 4. Repeat 8 to 12 times. Shoulder rolls   1. Sit comfortably with your feet shoulder-width apart. You can also do this exercise standing up. 2. Roll your shoulders up, then back, and then down in a smooth, circular motion. 3. Repeat 2 to 4 times. Follow-up care is a key part of your treatment and safety.  Be sure to make and go to all appointments, and call your doctor if you are having problems. It's also a good idea to know your test results and keep a list of the medicines you take. Where can you learn more? Go to http://www.gray.com/  Enter Z713 in the search box to learn more about \"Neck Arthritis: Exercises. \"  Current as of: November 16, 2020               Content Version: 12.8  © 0564-3417 [employer]. Care instructions adapted under license by [employer] (which disclaims liability or warranty for this information). If you have questions about a medical condition or this instruction, always ask your healthcare professional. [place] [address] any warranty or liability for your use of this information.

## 2021-04-25 NOTE — PROGRESS NOTES
Good Vit D level. Normal thyroid level. A1C remains above goal, even though sugar level looks better. Keep working to follow diabetic diet and take medicines daily. Good cholesterol numbers. Normal kidney and liver function. Normal blood cell counts. Follow up in 3-4 months to recheck diabetes.

## 2021-04-26 RX ORDER — INSULIN GLARGINE 300 U/ML
INJECTION, SOLUTION SUBCUTANEOUS
Qty: 84 ML | Refills: 1 | Status: SHIPPED | OUTPATIENT
Start: 2021-04-26 | End: 2021-07-28

## 2021-05-11 RX ORDER — LOSARTAN POTASSIUM 25 MG/1
TABLET ORAL
Qty: 90 TAB | Refills: 1 | Status: SHIPPED | OUTPATIENT
Start: 2021-05-11 | End: 2021-10-08 | Stop reason: SDUPTHER

## 2021-07-28 RX ORDER — INSULIN GLARGINE 300 U/ML
140 INJECTION, SOLUTION SUBCUTANEOUS DAILY
Qty: 13.5 ML | Refills: 1 | Status: SHIPPED | OUTPATIENT
Start: 2021-07-28 | End: 2021-09-07

## 2021-08-15 RX ORDER — ERGOCALCIFEROL 1.25 MG/1
CAPSULE ORAL
Qty: 9 CAPSULE | Refills: 12 | Status: SHIPPED | OUTPATIENT
Start: 2021-08-15 | End: 2022-06-22 | Stop reason: SDUPTHER

## 2021-08-27 NOTE — TELEPHONE ENCOUNTER
LVM - please call me back. I wanted to know if she has made a appt yet. LVM - please call me back. I wanted to set you up with a virtual appt regarding your Diabetes. [facility] said she has not scheduled a appt. She owes them money. patient returns from OR at 1420

## 2021-09-07 RX ORDER — INSULIN GLARGINE 300 U/ML
140 INJECTION, SOLUTION SUBCUTANEOUS DAILY
Qty: 13.5 ML | Refills: 0 | Status: SHIPPED | OUTPATIENT
Start: 2021-09-07 | End: 2021-10-08 | Stop reason: SDUPTHER

## 2021-10-07 PROBLEM — N18.30 CKD (CHRONIC KIDNEY DISEASE) STAGE 3, GFR 30-59 ML/MIN (HCC): Status: RESOLVED | Noted: 2019-07-22 | Resolved: 2021-10-07

## 2021-10-08 ENCOUNTER — OFFICE VISIT (OUTPATIENT)
Dept: FAMILY MEDICINE CLINIC | Age: 61
End: 2021-10-08
Payer: COMMERCIAL

## 2021-10-08 VITALS
WEIGHT: 231 LBS | OXYGEN SATURATION: 96 % | DIASTOLIC BLOOD PRESSURE: 80 MMHG | HEIGHT: 67 IN | TEMPERATURE: 97.1 F | HEART RATE: 69 BPM | BODY MASS INDEX: 36.26 KG/M2 | RESPIRATION RATE: 16 BRPM | SYSTOLIC BLOOD PRESSURE: 140 MMHG

## 2021-10-08 DIAGNOSIS — E11.21 TYPE 2 DIABETES WITH NEPHROPATHY (HCC): Primary | ICD-10-CM

## 2021-10-08 DIAGNOSIS — Z79.899 ENCOUNTER FOR LONG-TERM CURRENT USE OF MEDICATION: ICD-10-CM

## 2021-10-08 DIAGNOSIS — E78.00 HYPERCHOLESTEROLEMIA: ICD-10-CM

## 2021-10-08 PROCEDURE — 99214 OFFICE O/P EST MOD 30 MIN: CPT | Performed by: FAMILY MEDICINE

## 2021-10-08 PROCEDURE — 3052F HG A1C>EQUAL 8.0%<EQUAL 9.0%: CPT | Performed by: FAMILY MEDICINE

## 2021-10-08 RX ORDER — INSULIN GLARGINE 300 U/ML
140 INJECTION, SOLUTION SUBCUTANEOUS DAILY
Qty: 13.5 ML | Refills: 5 | Status: SHIPPED | OUTPATIENT
Start: 2021-10-08 | End: 2022-06-18

## 2021-10-08 RX ORDER — LOSARTAN POTASSIUM 25 MG/1
TABLET ORAL
Qty: 90 TABLET | Refills: 1 | Status: SHIPPED | OUTPATIENT
Start: 2021-10-08 | End: 2021-12-02

## 2021-10-08 NOTE — PROGRESS NOTES
Subjective:   hyperlipidemiaWilliambara PAVAN Acevedo is a 59 y.o. female seen for follow up of diabetes. She also has hyperlipidemia and obesity. Diabetic Review of Systems - medication compliance: compliant most of the time, diabetic diet compliance: compliant most of the time. UTD on eye exam.  Other symptoms and concerns: due for labs. Patient Active Problem List    Diagnosis Date Noted    Uncontrolled type 2 diabetes mellitus with diabetic cataract, with long-term current use of insulin ([facility] [address] 05/15/2015    Severe obesity ([facility] [address] 06/22/2020    Encounter for long-term current use of medication 04/05/2019    Nuclear cataract 07/12/2016    Open angle with borderline findings and low glaucoma risk in both eyes 07/12/2016    Osteopenia 05/15/2015    Hypercholesterolemia 04/28/2013    Thyroid goiter 04/19/2013    GERD (gastroesophageal reflux disease) 06/22/2012    Iritis 12/23/2011    DDD (degenerative disc disease), cervical 12/23/2011    Vitamin D deficiency 12/23/2011    Sleep apnea 11/08/2010    Heart murmur 11/08/2010     Current Outpatient Medications   Medication Sig Dispense Refill    insulin glargine U-300 conc (Toujeo SoloStar U-300 Insulin) 300 unit/mL (1.5 mL) inpn pen 140 Units by SubCUTAneous route daily. 13.5 mL 5    losartan (COZAAR) 25 mg tablet TAKE 1 (ONE) TABLET BY MOUTH ONCE DAILY 90 Tablet 1    ergocalciferol (ERGOCALCIFEROL) 1,250 mcg (50,000 unit) capsule TAKE 1 CAPSULE BY MOUTH TWICE A WEEK 9 Capsule 12    glucose blood VI test strips (OneTouch Ultra Blue Test Strip) strip Test sugar 4 times a day. DX E11.65 100 Strip 5    cyclobenzaprine (FLEXERIL) 5 mg tablet Take 1 Tab by mouth three (3) times daily as needed for Muscle Spasm(s).  Indications: muscle spasm 90 Tab 3    pravastatin (PRAVACHOL) 40 mg tablet TAKE ONE TABLET BY MOUTH NIGHTLY. **NEED OFFICE VISIT** 30 Tab 0    glipiZIDE (GLUCOTROL) 10 mg tablet TAKE 1 (ONE) TABLET BY MOUTH TWO TIMES A  Tab 5    prednisoLONE acetate (PRED FORTE) 1 % ophthalmic suspension INSTILL 1 DROP INTO RIGHT EYE 4 TIMES DAILY AS DIRECTED      omeprazole (PRILOSEC) 20 mg capsule TAKE ONE CAPSULE BY MOUTH ONCE DAILY 30 Cap 5    BD AUTOSHIELD DUO PEN NEEDLE 30 gauge x 3/16\" ndle       Lancets misc Accu Check Siomara Smart View Fast Click Drum. DX E11.9 Test blood sugar twice daily 200 Each 3    Blood-Glucose Meter monitoring kit ONE TOUCH ULTRA. Test once a day. .02 1 kit 0    aspirin delayed-release 81 mg tablet Take 81 mg by mouth daily.        Allergies   Allergen Reactions    Lisinopril Cough    Penicillin G Swelling     Past Medical History:   Diagnosis Date    Arthritis     GERD (gastroesophageal reflux disease)     Heart murmur 11/8/2010    Sleep apnea 11/8/2010     Past Surgical History:   Procedure Laterality Date    HX HEENT      uvuloplasty    HX HYSTERECTOMY      HX ORTHOPAEDIC Left     rotator cuff        Lab Results   Component Value Date/Time    Hemoglobin A1c 8.2 (H) 10/08/2021 12:22 PM    Hemoglobin A1c 8.5 (H) 04/16/2021 10:50 AM    Hemoglobin A1c 8.2 (H) 05/07/2020 12:16 PM    Hemoglobin A1c, External 13 09/16/2019 12:00 AM    Hemoglobin A1c, External 9.3 06/15/2017 12:00 AM    Hemoglobin A1c, External 8.0 02/04/2016 12:00 AM    Glucose 136 (H) 10/08/2021 12:22 PM    Glucose (POC) 326 (H) 07/17/2019 09:04 PM    Microalbumin/Creat ratio (mg/g creat) 8 04/16/2021 10:50 AM    Microalbumin,urine random 1.16 04/16/2021 10:50 AM    LDL, calculated 101.2 (H) 10/08/2021 12:22 PM    Creatinine 0.88 10/08/2021 12:22 PM      Lab Results   Component Value Date/Time    Cholesterol, total 175 10/08/2021 12:22 PM    HDL Cholesterol 54 10/08/2021 12:22 PM    LDL, calculated 101.2 (H) 10/08/2021 12:22 PM    LDL-C, External 113 11/03/2015 12:00 AM    Triglyceride 99 10/08/2021 12:22 PM    CHOL/HDL Ratio 3.2 10/08/2021 12:22 PM     Lab Results   Component Value Date/Time    GFR est non-AA >60 10/08/2021 12:22 PM    GFR est AA >60 10/08/2021 12:22 PM    Creatinine 0.88 10/08/2021 12:22 PM    BUN 16 10/08/2021 12:22 PM    Sodium 139 10/08/2021 12:22 PM    Potassium 4.7 10/08/2021 12:22 PM    Chloride 107 10/08/2021 12:22 PM    CO2 30 10/08/2021 12:22 PM        Review of Systems  Pertinent items are noted in HPI. Objective:     Visit Vitals  BP (!) 140/80 (BP 1 Location: Right upper arm)   Pulse 69   Temp 97.1 °F (36.2 °C) (Temporal)   Resp 16   Ht 5' 7\" (1.702 m)   Wt 231 lb (104.8 kg)   LMP 10/14/1987   SpO2 96%   BMI 36.18 kg/m²     Appearance: alert, well appearing, and in no distress and overweight. Exam: heart sounds normal rate, regular rhythm, normal S1, S2, no murmurs, rubs, clicks or gallops, chest clear  Lab review: orders written for new lab studies as appropriate; see orders. Assessment/Plan:     .  Diabetic issues reviewed with her: annual eye examinations at Ophthalmology discussed, glycohemoglobin and other lab monitoring discussed and long term diabetic complications discussed. ICD-10-CM ICD-9-CM    1. Type 2 diabetes with nephropathy (HCC)  E11.21 250.40 HEMOGLOBIN A1C WITH EAG     583.81 HEMOGLOBIN A1C WITH EAG   2. Hypercholesterolemia  E78.00 272.0 LIPID PANEL      LIPID PANEL   3. Encounter for long-term current use of medication  N95.030 O07.02 METABOLIC PANEL, COMPREHENSIVE      METABOLIC PANEL, COMPREHENSIVE   4. Uncontrolled type 2 diabetes mellitus with diabetic cataract, with long-term current use of insulin (HCC)  E11.36 250.52 insulin glargine U-300 conc (Toujeo SoloStar U-300 Insulin) 300 unit/mL (1.5 mL) inpn pen    Z79.4 366.41 losartan (COZAAR) 25 mg tablet    E11.65 V58.67      Order labs. Med refills ordered. May benefit from additional GLP1 agonist depending on A1C level. Work on weight loss. Declined FLU vaccine.

## 2021-10-08 NOTE — PATIENT INSTRUCTIONS
Recombinant Zoster (Shingles) Vaccine: What You Need to Know  Why get vaccinated? Recombinant zoster (shingles) vaccine can prevent shingles. Shingles (also called herpes zoster, or just zoster) is a painful skin rash, usually with blisters. In addition to the rash, shingles can cause fever, headache, chills, or upset stomach. More rarely, shingles can lead to pneumonia, hearing problems, blindness, brain inflammation (encephalitis), or death. The most common complication of shingles is long-term nerve pain called postherpetic neuralgia (PHN). PHN occurs in the areas where the shingles rash was, even after the rash clears up. It can last for months or years after the rash goes away. The pain from PHN can be severe and debilitating. About 10 to 18% of people who get shingles will experience PHN. The risk of PHN increases with age. An older adult with shingles is more likely to develop PHN and have longer lasting and more severe pain than a younger person with shingles. Shingles is caused by the varicella zoster virus, the same virus that causes chickenpox. After you have chickenpox, the virus stays in your body and can cause shingles later in life. Shingles cannot be passed from one person to another, but the virus that causes shingles can spread and cause chickenpox in someone who had never had chickenpox or received chickenpox vaccine. Recombinant shingles vaccine  Recombinant shingles vaccine provides strong protection against shingles. By preventing shingles, recombinant shingles vaccine also protects against PHN. Recombinant shingles vaccine is the preferred vaccine for the prevention of shingles. However, a different vaccine, live shingles vaccine, may be used in some circumstances. The recombinant shingles vaccine is recommended for adults 50 years and older without serious immune problems. It is given as a two-dose series.   This vaccine is also recommended for people who have already gotten another type of shingles vaccine, the live shingles vaccine. There is no live virus in this vaccine. Shingles vaccine may be given at the same time as other vaccines. Talk with your health care provider  Tell your vaccine provider if the person getting the vaccine:  · Has had an allergic reaction after a previous dose of recombinant shingles vaccine, or has any severe, life-threatening allergies. · Is pregnant or breastfeeding. · Is currently experiencing an episode of shingles. In some cases, your health care provider may decide to postpone shingles vaccination to a future visit. People with minor illnesses, such as a cold, may be vaccinated. People who are moderately or severely ill should usually wait until they recover before getting recombinant shingles vaccine. Your health care provider can give you more information. Risks of a vaccine reaction  · A sore arm with mild or moderate pain is very common after recombinant shingles vaccine, affecting about 80% of vaccinated people. Redness and swelling can also happen at the site of the injection. · Tiredness, muscle pain, headache, shivering, fever, stomach pain, and nausea happen after vaccination in more than half of people who receive recombinant shingles vaccine. In clinical trials, about 1 out of 6 people who got recombinant zoster vaccine experienced side effects that prevented them from doing regular activities. Symptoms usually went away on their own in 2 to 3 days. You should still get the second dose of recombinant zoster vaccine even if you had one of these reactions after the first dose. People sometimes faint after medical procedures, including vaccination. Tell your provider if you feel dizzy or have vision changes or ringing in the ears. As with any medicine, there is a very remote chance of a vaccine causing a severe allergic reaction, other serious injury, or death. What if there is a serious problem?   An allergic reaction could occur after the vaccinated person leaves the clinic. If you see signs of a severe allergic reaction (hives, swelling of the face and throat, difficulty breathing, a fast heartbeat, dizziness, or weakness), call 9-1-1 and get the person to the nearest hospital.  For other signs that concern you, call your health care provider. Adverse reactions should be reported to the Vaccine Adverse Event Reporting System (VAERS). Your health care provider will usually file this report, or you can do it yourself. Visit the VAERS website at www.vaers. [facility].gov or call 3-411.384.7690. VAERS is only for reporting reactions, and VAERS staff do not give medical advice. How can I learn more? · Ask your health care provider. · Call your local or state health department. · Contact the Centers for Disease Control and Prevention (CDC):  ? Call 5-623.497.8638 (1-800-CDC-INFO) or  ? Visit CDC's website at www.cdc.gov/vaccines  Vaccine Information Statement  Recombinant Zoster Vaccine  10/30/2019  [facility] and [facility] for Disease Control and Prevention  Many Vaccine Information Statements are available in [place] and other languages. See www.immunize.org/vis. Hojas de Información Sobre Vacunas están disponibles en Español y en muchos otros idiomas. Visite Lana.si   Care instructions adapted under license by [employer] (which disclaims liability or warranty for this information). If you have questions about a medical condition or this instruction, always ask your healthcare professional. [place] [address] any warranty or liability for your use of this information. Low Back Pain: Exercises  Introduction  Here are some examples of exercises for you to try. The exercises may be suggested for a condition or for rehabilitation. Start each exercise slowly. Ease off the exercises if you start to have pain.   You will be told when to start these exercises and which ones will work best for you. How to do the exercises  Press-up    1. Lie on your stomach, supporting your body with your forearms. 2. Press your elbows down into the floor to raise your upper back. As you do this, relax your stomach muscles and allow your back to arch without using your back muscles. As your press up, do not let your hips or pelvis come off the floor. 3. Hold for 15 to 30 seconds, then relax. 4. Repeat 2 to 4 times. Alternate arm and leg (bird dog) exercise    Do this exercise slowly. Try to keep your body straight at all times, and do not let one hip drop lower than the other. 1. Start on the floor, on your hands and knees. 2. Tighten your belly muscles. 3. Raise one leg off the floor, and hold it straight out behind you. Be careful not to let your hip drop down, because that will twist your trunk. 4. Hold for about 6 seconds, then lower your leg and switch to the other leg. 5. Repeat 8 to 12 times on each leg. 6. Over time, work up to holding for 10 to 30 seconds each time. 7. If you feel stable and secure with your leg raised, try raising the opposite arm straight out in front of you at the same time. Knee-to-chest exercise    1. Lie on your back with your knees bent and your feet flat on the floor. 2. Bring one knee to your chest, keeping the other foot flat on the floor (or keeping the other leg straight, whichever feels better on your lower back). 3. Keep your lower back pressed to the floor. Hold for at least 15 to 30 seconds. 4. Relax, and lower the knee to the starting position. 5. Repeat with the other leg. Repeat 2 to 4 times with each leg. 6. To get more stretch, put your other leg flat on the floor while pulling your knee to your chest.  Curl-ups    1. Lie on the floor on your back with your knees bent at a 90-degree angle. Your feet should be flat on the floor, about 12 inches from your buttocks.   2. Cross your arms over your chest. If this bothers your neck, try putting your hands behind your neck (not your head), with your elbows spread apart. 3. Slowly tighten your belly muscles and raise your shoulder blades off the floor. 4. Keep your head in line with your body, and do not press your chin to your chest.  5. Hold this position for 1 or 2 seconds, then slowly lower yourself back down to the floor. 6. Repeat 8 to 12 times. Pelvic tilt exercise    1. Lie on your back with your knees bent. 2. \"Brace\" your stomach. This means to tighten your muscles by pulling in and imagining your belly button moving toward your spine. You should feel like your back is pressing to the floor and your hips and pelvis are rocking back. 3. Hold for about 6 seconds while you breathe smoothly. 4. Repeat 8 to 12 times. Heel dig bridging    1. Lie on your back with both knees bent and your ankles bent so that only your heels are digging into the floor. Your knees should be bent about 90 degrees. 2. Then push your heels into the floor, squeeze your buttocks, and lift your hips off the floor until your shoulders, hips, and knees are all in a straight line. 3. Hold for about 6 seconds as you continue to breathe normally, and then slowly lower your hips back down to the floor and rest for up to 10 seconds. 4. Do 8 to 12 repetitions. Hamstring stretch in doorway    1. Lie on your back in a doorway, with one leg through the open door. 2. Slide your leg up the wall to straighten your knee. You should feel a gentle stretch down the back of your leg. 3. Hold the stretch for at least 15 to 30 seconds. Do not arch your back, point your toes, or bend either knee. Keep one heel touching the floor and the other heel touching the wall. 4. Repeat with your other leg. 5. Do 2 to 4 times for each leg. Hip flexor stretch    1. Kneel on the floor with one knee bent and one leg behind you. Place your forward knee over your foot. Keep your other knee touching the floor.   2. Slowly push your hips forward until you feel a stretch in the upper thigh of your rear leg. 3. Hold the stretch for at least 15 to 30 seconds. Repeat with your other leg. 4. Do 2 to 4 times on each side. Wall sit    1. Stand with your back 10 to 12 inches away from a wall. 2. Lean into the wall until your back is flat against it. 3. Slowly slide down until your knees are slightly bent, pressing your lower back into the wall. 4. Hold for about 6 seconds, then slide back up the wall. 5. Repeat 8 to 12 times. Follow-up care is a key part of your treatment and safety. Be sure to make and go to all appointments, and call your doctor if you are having problems. It's also a good idea to know your test results and keep a list of the medicines you take. Where can you learn more? Go to http://www.gray.com/  Enter S062 in the search box to learn more about \"Low Back Pain: Exercises. \"  Current as of: July 1, 2021               Content Version: 13.0  © 2006-2021 Healthwise[employer] Incorporated. Care instructions adapted under license by [employer] (which disclaims liability or warranty for this information). If you have questions about a medical condition or this instruction, always ask your healthcare professional. [address] any warranty or liability for your use of this information.

## 2021-10-08 NOTE — PROGRESS NOTES
Identified pt with two pt identifiers(name and ). Chief Complaint   Patient presents with    Diabetes    Labs     fasting    Hypertension        Health Maintenance Due   Topic    Shingrix Vaccine Age 50> (1 of 2)    COVID-19 Vaccine (2 - Pfizer 2-dose series)    Flu Vaccine (1)       Wt Readings from Last 3 Encounters:   10/08/21 231 lb (104.8 kg)   21 233 lb (105.7 kg)   20 231 lb 12.8 oz (105.1 kg)     Temp Readings from Last 3 Encounters:   10/08/21 97.1 °F (36.2 °C) (Temporal)   21 97.5 °F (36.4 °C) (Temporal)   20 98 °F (36.7 °C) (Oral)     BP Readings from Last 3 Encounters:   10/08/21 (!) 150/78   21 129/75   20 132/60     Pulse Readings from Last 3 Encounters:   10/08/21 69   21 66   20 62         Learning Assessment:  :     Learning Assessment 2017   PRIMARY LEARNER Patient Patient   HIGHEST LEVEL OF EDUCATION - PRIMARY LEARNER  - 2 YEARS OF COLLEGE   BARRIERS PRIMARY LEARNER - NONE   CO-LEARNER CAREGIVER - No   PRIMARY LANGUAGE ENGLISH ENGLISH   LEARNER PREFERENCE PRIMARY DEMONSTRATION DEMONSTRATION   ANSWERED BY patient self   RELATIONSHIP SELF SELF       Depression Screening:  :     3 most recent PHQ Screens 10/8/2021   Little interest or pleasure in doing things Not at all   Feeling down, depressed, irritable, or hopeless Not at all   Total Score PHQ 2 0       Fall Risk Assessment:  :     Fall Risk Assessment, last 12 mths 2020   Able to walk? Yes   Fall in past 12 months? No   Number of falls in past 12 months -   Fall with injury? -       Abuse Screening:  :     Abuse Screening Questionnaire 10/8/2021 2020 2020 2019 2018 10/18/2017 2017   Do you ever feel afraid of your partner? N N N N N N N   Are you in a relationship with someone who physically or mentally threatens you? N N N N N N N   Is it safe for you to go home?  Y Y Y Y Y Y Y       Coordination of Care Questionnaire:  :     1) Have you been to an emergency room, urgent care clinic since your last visit? no   Hospitalized since your last visit? no             2) Have you seen or consulted any other health care providers outside of [address] [place] [address] since your last visit? no  (Include any pap smears or colon screenings in this section.)    3) Do you have an Advance Directive on file? no  Are you interested in receiving information about Advance Directives? no    Reviewed record in preparation for visit and have obtained necessary documentation.

## 2021-10-09 LAB
ALBUMIN SERPL-MCNC: 3.5 G/DL (ref 3.5–5)
ALBUMIN/GLOB SERPL: 1 {RATIO} (ref 1.1–2.2)
ALP SERPL-CCNC: 85 U/L (ref 45–117)
ALT SERPL-CCNC: 31 U/L (ref 12–78)
ANION GAP SERPL CALC-SCNC: 2 MMOL/L (ref 5–15)
AST SERPL-CCNC: 16 U/L (ref 15–37)
BILIRUB SERPL-MCNC: 0.5 MG/DL (ref 0.2–1)
BUN SERPL-MCNC: 16 MG/DL (ref 6–20)
BUN/CREAT SERPL: 18 (ref 12–20)
CALCIUM SERPL-MCNC: 9 MG/DL (ref 8.5–10.1)
CHLORIDE SERPL-SCNC: 107 MMOL/L (ref 97–108)
CHOLEST SERPL-MCNC: 175 MG/DL
CO2 SERPL-SCNC: 30 MMOL/L (ref 21–32)
CREAT SERPL-MCNC: 0.88 MG/DL (ref 0.55–1.02)
EST. AVERAGE GLUCOSE BLD GHB EST-MCNC: 189 MG/DL
GLOBULIN SER CALC-MCNC: 3.5 G/DL (ref 2–4)
GLUCOSE SERPL-MCNC: 136 MG/DL (ref 65–100)
HBA1C MFR BLD: 8.2 % (ref 4–5.6)
HDLC SERPL-MCNC: 54 MG/DL
HDLC SERPL: 3.2 {RATIO} (ref 0–5)
LDLC SERPL CALC-MCNC: 101.2 MG/DL (ref 0–100)
POTASSIUM SERPL-SCNC: 4.7 MMOL/L (ref 3.5–5.1)
PROT SERPL-MCNC: 7 G/DL (ref 6.4–8.2)
SODIUM SERPL-SCNC: 139 MMOL/L (ref 136–145)
TRIGL SERPL-MCNC: 99 MG/DL (ref ?–150)
VLDLC SERPL CALC-MCNC: 19.8 MG/DL

## 2021-10-13 NOTE — PROGRESS NOTES
Good cholesterol numbers. Diabetes control is stable with A1C at 8. 2. good liver and kidney function. Continue on current medicines. Follow up in 6 months.

## 2021-10-25 RX ORDER — GLIPIZIDE 10 MG/1
TABLET ORAL
Qty: 120 TABLET | Refills: 5 | Status: SHIPPED | OUTPATIENT
Start: 2021-10-25 | End: 2022-06-22 | Stop reason: SDUPTHER

## 2021-10-29 ENCOUNTER — TELEPHONE (OUTPATIENT)
Dept: FAMILY MEDICINE CLINIC | Age: 61
End: 2021-10-29

## 2021-10-29 DIAGNOSIS — M25.562 LEFT KNEE PAIN, UNSPECIFIED CHRONICITY: Primary | ICD-10-CM

## 2021-10-29 RX ORDER — NAPROXEN 500 MG/1
500 TABLET ORAL 2 TIMES DAILY WITH MEALS
Qty: 60 TABLET | Refills: 0 | Status: SHIPPED | OUTPATIENT
Start: 2021-10-29 | End: 2022-06-22

## 2021-10-29 NOTE — TELEPHONE ENCOUNTER
Pt is have LT knee pain since last night with slight sweelling and is having a hard time walking. She has taken Tylenol Arthritis with no relief. Is there anything you can give her for it.

## 2021-10-29 NOTE — TELEPHONE ENCOUNTER
----- Message from [facility] sent at 10/29/2021 10:43 AM EDT -----  Subject: Refill Request    QUESTIONS  Name of Medication? Other - Anti-inflammatory   Patient-reported dosage and instructions? n/A  How many days do you have left? 0  Preferred Pharmacy? [facility] phone number (if available)? [phone]  Additional Information for Provider? Patient called in stated she is   having pain in her leg and is requesting an Anti-inflammatory be sent over   to pharmacy. best contact is [phone] messages okay.  ---------------------------------------------------------------------------  --------------  CALL BACK INFO  What is the best way for the office to contact you? OK to leave message on   voicemail  Preferred Call Back Phone Number?  [phone]

## 2021-10-29 NOTE — TELEPHONE ENCOUNTER
I spoke with pt. C/O L knee pain. Worse last few days. Has to use a cane to walk. Previous visit with [facility] Dr Berkley Vang Jan 2021. DX patellofemoral arthritis. Will order NSAID. [facility] get worse over weekend[facility]

## 2021-11-22 ENCOUNTER — TELEPHONE (OUTPATIENT)
Dept: FAMILY MEDICINE CLINIC | Age: 61
End: 2021-11-22

## 2021-11-22 NOTE — TELEPHONE ENCOUNTER
----- Message from [facility] sent at 11/22/2021 10:18 AM EST -----  Regarding: FW: knee and back issues    ----- Message -----  From: Roshan [place]  Sent: 11/22/2021  10:11 AM EST  To: [facility] Nurse Pool  Subject: knee and back issues                             Hi Dr Jourdan Aguilera, hope all is well with you! My knee is better, walking without cane but still got a limp, bothers me the most when I lay down and has started to pop. My back is a bigger issue, sitting is fine, standing and activity it doesn't allow much without discomfort, which is in the lower back buttock area. Also appears I may have started to retain fluid, my socks aren't tight but I have deep rings in my legs and sometimes when I wake up my fingers are tight. Admittedly I haven't started the back exercises yet , think I'm going to have to get the knee right first. In  the meantime is there anything I can do for my back?

## 2021-11-23 ENCOUNTER — VIRTUAL VISIT (OUTPATIENT)
Dept: FAMILY MEDICINE CLINIC | Age: 61
End: 2021-11-23
Payer: COMMERCIAL

## 2021-11-23 DIAGNOSIS — M54.50 MIDLINE LOW BACK PAIN WITHOUT SCIATICA, UNSPECIFIED CHRONICITY: Primary | ICD-10-CM

## 2021-11-23 DIAGNOSIS — M25.562 LEFT KNEE PAIN, UNSPECIFIED CHRONICITY: ICD-10-CM

## 2021-11-23 PROCEDURE — 99213 OFFICE O/P EST LOW 20 MIN: CPT | Performed by: FAMILY MEDICINE

## 2021-11-23 RX ORDER — METHYLPREDNISOLONE 4 MG/1
TABLET ORAL
Qty: 1 DOSE PACK | Refills: 0 | Status: SHIPPED | OUTPATIENT
Start: 2021-11-23 | End: 2022-06-22 | Stop reason: ALTCHOICE

## 2021-11-23 NOTE — PROGRESS NOTES
Chalo De is a 59 y.o. female who was seen by synchronous (real-time) audio-video technology on 11/23/2021 for Back Pain        Assessment & Plan:   Diagnoses and all orders for this visit:    1. Midline low back pain without sciatica, unspecified chronicity  -     methylPREDNISolone (MEDROL DOSEPACK) 4 mg tablet; As directed. 2. Left knee pain, unspecified chronicity    order Medrol pack. She has Flexeril at home to take for back pain. FU with ORTHO for knee pain. warning about high sugar while on oral steroids. Recommend low salt diet for fluid retention. Recent labs showed normal electrolytes and renal function. I spent at least 7 minutes on this visit with this established patient. 712  Subjective:       Prior to Admission medications    Medication Sig Start Date End Date Taking? Authorizing Provider   naproxen (NAPROSYN) 500 mg tablet Take 1 Tablet by mouth two (2) times daily (with meals). Indications: joint damage causing pain and loss of function 55/98/47   Jazmín Roberts MD   glipiZIDE (GLUCOTROL) 10 mg tablet TAKE 1 (ONE) TABLET BY MOUTH TWO TIMES A DAY 72/52/54   Jazmín Roberts MD   insulin glargine U-300 conc (Toujeo SoloStar U-300 Insulin) 300 unit/mL (1.5 mL) inpn pen 140 Units by SubCUTAneous route daily. 98/1/95   Jazmín Roberts MD   losartan (COZAAR) 25 mg tablet TAKE 1 (ONE) TABLET BY MOUTH ONCE DAILY 61/2/28   Jazmín Roberts MD   ergocalciferol (ERGOCALCIFEROL) 1,250 mcg (50,000 unit) capsule TAKE 1 CAPSULE BY MOUTH TWICE A WEEK 0/32/47   Jazmín Roberts MD   glucose blood VI test strips (OneTouch Ultra Blue Test Strip) strip Test sugar 4 times a day. DX E11.65 4/94/29   Jazmín Roberts MD   cyclobenzaprine (FLEXERIL) 5 mg tablet Take 1 Tab by mouth three (3) times daily as needed for Muscle Spasm(s).  Indications: muscle spasm 5/01/12   Jazmín Roberts MD   pravastatin (PRAVACHOL) 40 mg tablet TAKE ONE TABLET BY MOUTH NIGHTLY. **NEED OFFICE VISIT** 1/28/21   Rebecca Nette Kimble MD   prednisoLONE acetate (PRED FORTE) 1 % ophthalmic suspension INSTILL 1 DROP INTO RIGHT EYE 4 TIMES DAILY AS DIRECTED 4/14/20   Provider, Historical   omeprazole (PRILOSEC) 20 mg capsule TAKE ONE CAPSULE BY MOUTH ONCE DAILY 4/91/86   Ramin Carpenter MD   BD AUTOSHIELD DUO PEN NEEDLE 30 gauge x 3/16\" ndle  12/8/15   Provider, Historical   Lancets misc Accu Check Siomara Smart View Fast Click Drum. DX E11.9 Test blood sugar twice daily 03/5/28   Ramin Carpenter MD   Blood-Glucose Meter monitoring kit ONE TOUCH ULTRA. Test once a day. .02 17/05/15   Ramin Carpenter MD   aspirin delayed-release 81 mg tablet Take 81 mg by mouth daily. Provider, Historical     Patient Active Problem List    Diagnosis Date Noted    Uncontrolled type 2 diabetes mellitus with diabetic cataract, with long-term current use of insulin ([facility] [address] 05/15/2015    Severe obesity ([facility] [address] 06/22/2020    Encounter for long-term current use of medication 04/05/2019    Nuclear cataract 07/12/2016    Open angle with borderline findings and low glaucoma risk in both eyes 07/12/2016    Osteopenia 05/15/2015    Hypercholesterolemia 04/28/2013    Thyroid goiter 04/19/2013    GERD (gastroesophageal reflux disease) 06/22/2012    Iritis 12/23/2011    DDD (degenerative disc disease), cervical 12/23/2011    Vitamin D deficiency 12/23/2011    Sleep apnea 11/08/2010    Heart murmur 11/08/2010     Current Outpatient Medications   Medication Sig Dispense Refill    methylPREDNISolone (MEDROL DOSEPACK) 4 mg tablet As directed. 1 Dose Pack 0    naproxen (NAPROSYN) 500 mg tablet Take 1 Tablet by mouth two (2) times daily (with meals). Indications: joint damage causing pain and loss of function 60 Tablet 0    glipiZIDE (GLUCOTROL) 10 mg tablet TAKE 1 (ONE) TABLET BY MOUTH TWO TIMES A  Tablet 5    insulin glargine U-300 conc (Toujeo SoloStar U-300 Insulin) 300 unit/mL (1.5 mL) inpn pen 140 Units by SubCUTAneous route daily.  13.5 mL 5  losartan (COZAAR) 25 mg tablet TAKE 1 (ONE) TABLET BY MOUTH ONCE DAILY 90 Tablet 1    ergocalciferol (ERGOCALCIFEROL) 1,250 mcg (50,000 unit) capsule TAKE 1 CAPSULE BY MOUTH TWICE A WEEK 9 Capsule 12    glucose blood VI test strips (OneTouch Ultra Blue Test Strip) strip Test sugar 4 times a day. DX E11.65 100 Strip 5    cyclobenzaprine (FLEXERIL) 5 mg tablet Take 1 Tab by mouth three (3) times daily as needed for Muscle Spasm(s). Indications: muscle spasm 90 Tab 3    pravastatin (PRAVACHOL) 40 mg tablet TAKE ONE TABLET BY MOUTH NIGHTLY. **NEED OFFICE VISIT** 30 Tab 0    prednisoLONE acetate (PRED FORTE) 1 % ophthalmic suspension INSTILL 1 DROP INTO RIGHT EYE 4 TIMES DAILY AS DIRECTED      omeprazole (PRILOSEC) 20 mg capsule TAKE ONE CAPSULE BY MOUTH ONCE DAILY 30 Cap 5    BD AUTOSHIELD DUO PEN NEEDLE 30 gauge x 3/16\" ndle       Lancets misc Accu Check Siomara Smart View Fast Click Drum. DX E11.9 Test blood sugar twice daily 200 Each 3    Blood-Glucose Meter monitoring kit ONE TOUCH ULTRA. Test once a day. .02 1 kit 0    aspirin delayed-release 81 mg tablet Take 81 mg by mouth daily. Allergies   Allergen Reactions    Lisinopril Cough    Penicillin G Swelling     Past Medical History:   Diagnosis Date    Arthritis     GERD (gastroesophageal reflux disease)     Heart murmur 11/8/2010    Sleep apnea 11/8/2010       ROS   C/O low back pain, radiate to buttocks. No pain in legs. No numbness. OK when sitting but pain with standing and activity. She cont to take Naprosyn. Knee is better but not completely. Notes some fluid retention. Objective:   No flowsheet data found.    General: alert, cooperative, no distress   Mental  status: normal mood, behavior, speech, dress, motor activity, and thought processes, able to follow commands   HENT: NCAT   Neck: no visualized mass   Resp: no respiratory distress   Neuro: no gross deficits   Skin: no discoloration or lesions of concern on visible areas   Psychiatric: normal affect, consistent with stated mood, no evidence of hallucinations     Additional exam findings: We discussed the expected course, resolution and complications of the diagnosis(es) in detail. Medication risks, benefits, costs, interactions, and alternatives were discussed as indicated. I advised her to contact the office if her condition worsens, changes or fails to improve as anticipated. She expressed understanding with the diagnosis(es) and plan. Jonh Cason, was evaluated through a synchronous (real-time) audio-video encounter. The patient (or guardian if applicable) is aware that this is a billable service. Verbal consent to proceed has been obtained within the past 12 months. The visit was conducted pursuant to the emergency declaration under the [address] [place] [address], [address] [place] [address] authority and the [employer] and [employer] [employer] General Act. Patient identification was verified, and a caregiver was present when appropriate. The patient was located in a state where the provider was credentialed to provide care.     Mainor Hector MD

## 2021-12-02 RX ORDER — LOSARTAN POTASSIUM 25 MG/1
TABLET ORAL
Qty: 90 TABLET | Refills: 1 | Status: SHIPPED | OUTPATIENT
Start: 2021-12-02 | End: 2022-06-22 | Stop reason: SDUPTHER

## 2022-03-03 DIAGNOSIS — E78.00 HYPERCHOLESTEROLEMIA: ICD-10-CM

## 2022-03-03 RX ORDER — PRAVASTATIN SODIUM 40 MG/1
TABLET ORAL
Qty: 90 TABLET | Refills: 1 | Status: SHIPPED | OUTPATIENT
Start: 2022-03-03 | End: 2022-06-22 | Stop reason: SDUPTHER

## 2022-03-18 PROBLEM — E66.01 SEVERE OBESITY (HCC): Status: ACTIVE | Noted: 2020-06-22

## 2022-03-19 PROBLEM — Z79.899 ENCOUNTER FOR LONG-TERM CURRENT USE OF MEDICATION: Status: ACTIVE | Noted: 2019-04-05

## 2022-06-18 RX ORDER — INSULIN GLARGINE 300 U/ML
INJECTION, SOLUTION SUBCUTANEOUS
Qty: 13.5 ML | Refills: 5 | Status: SHIPPED | OUTPATIENT
Start: 2022-06-18

## 2022-06-21 ENCOUNTER — PATIENT OUTREACH (OUTPATIENT)
Dept: CASE MANAGEMENT | Age: 62
End: 2022-06-21

## 2022-06-21 PROBLEM — E11.21 TYPE 2 DIABETES WITH NEPHROPATHY (HCC): Status: ACTIVE | Noted: 2022-06-21

## 2022-06-21 PROBLEM — E11.9 TYPE 2 DIABETES MELLITUS (HCC): Status: ACTIVE | Noted: 2022-06-21

## 2022-06-21 NOTE — PROGRESS NOTES
Ambulatory Care Management Note    Date/Time:  6/21/2022 10:55 AM    This patient was received as a referral from Provider. Ambulatory Care Manager outreached to patient today to offer care management services. Introduction to self and role of care manager provided. Patient accepted care management services at this time. Follow up call scheduled at this time. Patient has Ambulatory Care Manager's contact number for for any questions or concerns.

## 2022-06-22 ENCOUNTER — LAB ONLY (OUTPATIENT)
Dept: FAMILY MEDICINE CLINIC | Age: 62
End: 2022-06-22

## 2022-06-22 ENCOUNTER — OFFICE VISIT (OUTPATIENT)
Dept: FAMILY MEDICINE CLINIC | Age: 62
End: 2022-06-22
Payer: COMMERCIAL

## 2022-06-22 VITALS
DIASTOLIC BLOOD PRESSURE: 60 MMHG | WEIGHT: 235 LBS | HEIGHT: 67 IN | HEART RATE: 71 BPM | BODY MASS INDEX: 36.88 KG/M2 | SYSTOLIC BLOOD PRESSURE: 126 MMHG | TEMPERATURE: 97.3 F | OXYGEN SATURATION: 97 % | RESPIRATION RATE: 14 BRPM

## 2022-06-22 DIAGNOSIS — M79.605 PAIN OF LEFT LOWER EXTREMITY: ICD-10-CM

## 2022-06-22 DIAGNOSIS — M54.50 CHRONIC MIDLINE LOW BACK PAIN, UNSPECIFIED WHETHER SCIATICA PRESENT: ICD-10-CM

## 2022-06-22 DIAGNOSIS — E11.29 CONTROLLED TYPE 2 DIABETES MELLITUS WITH MICROALBUMINURIA, WITH LONG-TERM CURRENT USE OF INSULIN (HCC): ICD-10-CM

## 2022-06-22 DIAGNOSIS — E55.9 VITAMIN D DEFICIENCY: ICD-10-CM

## 2022-06-22 DIAGNOSIS — Z00.00 ROUTINE ADULT HEALTH MAINTENANCE: Primary | ICD-10-CM

## 2022-06-22 DIAGNOSIS — E78.00 HYPERCHOLESTEROLEMIA: ICD-10-CM

## 2022-06-22 DIAGNOSIS — R20.0 NUMBNESS OF LEFT FOOT: ICD-10-CM

## 2022-06-22 DIAGNOSIS — R41.3 MEMORY PROBLEM: ICD-10-CM

## 2022-06-22 DIAGNOSIS — R80.9 TYPE 2 DIABETES MELLITUS WITH MICROALBUMINURIA, WITH LONG-TERM CURRENT USE OF INSULIN (HCC): ICD-10-CM

## 2022-06-22 DIAGNOSIS — R07.9 CHEST PAIN, UNSPECIFIED TYPE: ICD-10-CM

## 2022-06-22 DIAGNOSIS — Z23 NEED FOR SHINGLES VACCINE: ICD-10-CM

## 2022-06-22 DIAGNOSIS — Z23 ENCOUNTER FOR IMMUNIZATION: ICD-10-CM

## 2022-06-22 DIAGNOSIS — E66.01 SEVERE OBESITY (BMI 35.0-39.9) WITH COMORBIDITY (HCC): ICD-10-CM

## 2022-06-22 DIAGNOSIS — Z79.4 CONTROLLED TYPE 2 DIABETES MELLITUS WITH MICROALBUMINURIA, WITH LONG-TERM CURRENT USE OF INSULIN (HCC): ICD-10-CM

## 2022-06-22 DIAGNOSIS — E11.21 TYPE 2 DIABETES WITH NEPHROPATHY (HCC): ICD-10-CM

## 2022-06-22 DIAGNOSIS — G89.29 CHRONIC MIDLINE LOW BACK PAIN, UNSPECIFIED WHETHER SCIATICA PRESENT: ICD-10-CM

## 2022-06-22 DIAGNOSIS — Z79.4 TYPE 2 DIABETES MELLITUS WITH MICROALBUMINURIA, WITH LONG-TERM CURRENT USE OF INSULIN (HCC): ICD-10-CM

## 2022-06-22 DIAGNOSIS — M50.30 DEGENERATIVE DISC DISEASE, CERVICAL: ICD-10-CM

## 2022-06-22 DIAGNOSIS — Z23 NEED FOR VACCINATION WITH 13-POLYVALENT PNEUMOCOCCAL CONJUGATE VACCINE: ICD-10-CM

## 2022-06-22 DIAGNOSIS — E11.29 TYPE 2 DIABETES MELLITUS WITH MICROALBUMINURIA, WITH LONG-TERM CURRENT USE OF INSULIN (HCC): ICD-10-CM

## 2022-06-22 DIAGNOSIS — R80.9 CONTROLLED TYPE 2 DIABETES MELLITUS WITH MICROALBUMINURIA, WITH LONG-TERM CURRENT USE OF INSULIN (HCC): ICD-10-CM

## 2022-06-22 LAB — 25(OH)D3 SERPL-MCNC: 78 NG/ML (ref 30–100)

## 2022-06-22 PROCEDURE — 90471 IMMUNIZATION ADMIN: CPT | Performed by: FAMILY MEDICINE

## 2022-06-22 PROCEDURE — 99396 PREV VISIT EST AGE 40-64: CPT | Performed by: FAMILY MEDICINE

## 2022-06-22 PROCEDURE — 90670 PCV13 VACCINE IM: CPT | Performed by: FAMILY MEDICINE

## 2022-06-22 RX ORDER — LOSARTAN POTASSIUM 25 MG/1
25 TABLET ORAL DAILY
Qty: 30 TABLET | Refills: 12 | Status: SHIPPED | OUTPATIENT
Start: 2022-06-22

## 2022-06-22 RX ORDER — ZOSTER VACCINE RECOMBINANT, ADJUVANTED 50 MCG/0.5
0.5 KIT INTRAMUSCULAR ONCE
Qty: 0.5 ML | Refills: 1 | Status: SHIPPED | OUTPATIENT
Start: 2022-06-22 | End: 2022-06-22

## 2022-06-22 RX ORDER — ERGOCALCIFEROL 1.25 MG/1
50000 CAPSULE ORAL
Qty: 9 CAPSULE | Refills: 12 | Status: SHIPPED | OUTPATIENT
Start: 2022-06-24 | End: 2022-07-04

## 2022-06-22 RX ORDER — GLIPIZIDE 10 MG/1
TABLET ORAL
Qty: 60 TABLET | Refills: 5 | Status: SHIPPED | OUTPATIENT
Start: 2022-06-22 | End: 2022-10-26

## 2022-06-22 RX ORDER — PRAVASTATIN SODIUM 40 MG/1
TABLET ORAL
Qty: 90 TABLET | Refills: 1 | Status: SHIPPED | OUTPATIENT
Start: 2022-06-22 | End: 2022-10-05

## 2022-06-22 RX ORDER — EMPAGLIFLOZIN AND LINAGLIPTIN 25; 5 MG/1; MG/1
TABLET, FILM COATED ORAL
COMMUNITY

## 2022-06-22 RX ORDER — CYCLOBENZAPRINE HCL 5 MG
5 TABLET ORAL
Qty: 90 TABLET | Refills: 3 | Status: SHIPPED | OUTPATIENT
Start: 2022-06-22

## 2022-06-22 NOTE — PROGRESS NOTES
Subjective:   57 y.o. female for Well Woman Check. Patient's last menstrual period was 10/14/1987. Social History: single partner, contraception - status post hysterectomy and post menopausal status. Pertinent past medical hstory: DM, obesity. Patient Active Problem List    Diagnosis Date Noted    Uncontrolled type 2 diabetes mellitus with diabetic cataract, with long-term current use of insulin 05/15/2015    Status post hysterectomy 06/23/2022    Type 2 diabetes with nephropathy ([facility] [address] 06/21/2022    Type 2 diabetes mellitus 06/21/2022    Severe obesity ([facility] [address] 06/22/2020    Encounter for long-term current use of medication 04/05/2019    Nuclear cataract 07/12/2016    Open angle with borderline findings and low glaucoma risk in both eyes 07/12/2016    Osteopenia 05/15/2015    Hypercholesterolemia 04/28/2013    Thyroid goiter 04/19/2013    GERD (gastroesophageal reflux disease) 06/22/2012    Iritis 12/23/2011    DDD (degenerative disc disease), cervical 12/23/2011    Vitamin D deficiency 12/23/2011    Sleep apnea 11/08/2010    Heart murmur 11/08/2010     Current Outpatient Medications   Medication Sig Dispense Refill    empagliflozin-linagliptin (Glyxambi) 25-5 mg tab take one tablet by mouth once daily in the morning      cyclobenzaprine (FLEXERIL) 5 mg tablet Take 1 Tablet by mouth three (3) times daily as needed for Muscle Spasm(s). Indications: muscle spasm 90 Tablet 3    glucose blood VI test strips (ASCENSIA AUTODISC VI, ONE TOUCH ULTRA TEST VI) strip One Touch Ultra test strips. Test twice a day. 100 Strip 3    [START ON 6/24/2022] ergocalciferol (ERGOCALCIFEROL) 1,250 mcg (50,000 unit) capsule Take 1 Capsule by mouth every Monday and Friday. 9 Capsule 12    glipiZIDE (GLUCOTROL) 10 mg tablet TAKE 1 (ONE) TABLET BY MOUTH TWO TIMES A DAY 60 Tablet 5    losartan (COZAAR) 25 mg tablet Take 1 Tablet by mouth daily.  30 Tablet 12    pravastatin (PRAVACHOL) 40 mg tablet TAKE ONE TABLET BY MOUTH NIGHTLY. 90 Tablet 1    Toujeo SoloStar U-300 Insulin 300 unit/mL (1.5 mL) inpn pen INJECT 140 UNITS BY SUBCUTANEOUS ROUTE DAILY. 13.5 mL 5    omeprazole (PRILOSEC) 20 mg capsule TAKE ONE CAPSULE BY MOUTH ONCE DAILY 30 Cap 5    BD AUTOSHIELD DUO PEN NEEDLE 30 gauge x 3/16\" ndle       Lancets misc Accu Check Siomara Smart View Fast Click Drum. DX E11.9 Test blood sugar twice daily 200 Each 3    Blood-Glucose Meter monitoring kit ONE TOUCH ULTRA. Test once a day. .02 1 kit 0    aspirin delayed-release 81 mg tablet Take 81 mg by mouth daily. (Patient not taking: Reported on 6/22/2022)       Allergies   Allergen Reactions    Lisinopril Cough    Penicillin G Swelling     Past Medical History:   Diagnosis Date    Arthritis     GERD (gastroesophageal reflux disease)     Heart murmur 11/8/2010    Sleep apnea 11/8/2010     Past Surgical History:   Procedure Laterality Date    HX HEENT      uvuloplasty    HX HYSTERECTOMY      HX ORTHOPAEDIC Left     rotator cuff     Family History   Problem Relation Age of Onset    Hypertension Mother     Diabetes Father     Heart Disease Father     Diabetes Maternal Grandmother     Diabetes Paternal Grandmother      Social History     Tobacco Use    Smoking status: Never Smoker    Smokeless tobacco: Never Used   Substance Use Topics    Alcohol use: Yes     Comment: ocassional        ROS:  Feeling well. Occasional R sided CP, not related to exertion. Short duration. +Family hx heart dz. No abdominal pain, change in bowel habits, black or bloody stools. No urinary tract symptoms. + low back pain. Numbness and pain in feet, rene left big toe. Also concerned about poor short term memory.   Objective:     Visit Vitals  /60 (BP 1 Location: Left upper arm, BP Patient Position: Sitting, BP Cuff Size: Large adult)   Pulse 71   Temp 97.3 °F (36.3 °C) (Temporal)   Resp 14   Ht 5' 7\" (1.702 m)   Wt 235 lb (106.6 kg)   LMP 10/14/1987   SpO2 97%   BMI 36.81 kg/m²     The patient appears well, alert, oriented x 3, in no distress. ENT normal.  Neck supple. No adenopathy or thyromegaly. ANDREW. Lungs are clear, good air entry, no wheezes, rhonchi or rales. S1 and S2 normal, no murmurs, regular rate and rhythm. Abdomen soft without tenderness, guarding, mass or organomegaly. Extremities show no edema, normal peripheral pulses. Neurological is normal, no focal findings. Diabetic foot exam:     Left Foot:   Visual Exam: normal    Pulse DP: 2+ (normal)   Filament test: normal sensation          Right Foot:   Visual Exam: normal    Pulse DP: 2+ (normal)   Filament test: normal sensation        Labs reviewed done in March by RAMSES. Assessment/Plan:   well woman  mammogram  additional lab tests per orders  return annually or prn    ICD-10-CM ICD-9-CM    1. Routine adult health maintenance  Z00.00 V70.0    2. Type 2 diabetes with nephropathy (HCC)  E11.21 250.40  DIABETES FOOT EXAM     583.81 glucose blood VI test strips (ASCENSIA AUTODISC VI, ONE TOUCH ULTRA TEST VI) strip   3. Type 2 diabetes mellitus with microalbuminuria, with long-term current use of insulin (HCC)  E11.29 250.40     R80.9 791.0     Z79.4 V58.67    4. Need for vaccination with 13-polyvalent pneumococcal conjugate vaccine  Z23 V03.82 PNEUMOCOCCAL, PCV-13, (AGE 6 WKS+), IM      ME IMMUNIZ ADMIN,1 SINGLE/COMB VAC/TOXOID   5. Need for shingles vaccine  Z23 V04.89 varicella-zoster recombinant, PF, (Shingrix, PF,) 50 mcg/0.5 mL susr injection   6. Vitamin D deficiency  E55.9 268.9 VITAMIN D, 25 HYDROXY      ergocalciferol (ERGOCALCIFEROL) 1,250 mcg (50,000 unit) capsule   7. Encounter for immunization  Z23 V03.89 PNEUMOCOCCAL, PCV-13, (AGE 6 WKS+), IM      ME IMMUNIZ ADMIN,1 SINGLE/COMB VAC/TOXOID   8. Degenerative disc disease, cervical  M50.30 722.4 cyclobenzaprine (FLEXERIL) 5 mg tablet   9. Severe obesity (BMI 35.0-39. 9) with comorbidity ([address]  E66.01 278.01    10.  Controlled type 2 diabetes mellitus with microalbuminuria, with long-term current use of insulin (HCC)  E11.29 250.40 glipiZIDE (GLUCOTROL) 10 mg tablet    R80.9 791.0 losartan (COZAAR) 25 mg tablet    Z79.4 V58.67    11. Hypercholesterolemia  E78.00 272.0 pravastatin (PRAVACHOL) 40 mg tablet   12. Memory problem  R41.3 780.93 REFERRAL TO NEUROPSYCHOLOGY   13. Chest pain, unspecified type  R07.9 786.50 EXERCISE CARDIAC STRESS TEST   14. Pain of left lower extremity  M79.605 729.5 ANKLE BRACHIAL INDEX   15. Numbness of left foot  R20.8 782.0 ANKLE BRACHIAL INDEX   16. Chronic midline low back pain, unspecified whether sciatica present  M54.50 724.2 REFERRAL TO ORTHOPEDICS    G89.29 338.29    . Check Vit D level. Order Exercise stress test  Check KAYLYN  [facility] to [facility] for [facility] pain  Med refills sent.   Prevnar 13 given

## 2022-06-22 NOTE — PATIENT INSTRUCTIONS
Back Care and Preventing Injuries: Care Instructions  Your Care Instructions     You can hurt your back doing many everyday activities: lifting a heavy box, bending down to garden, exercising at the gym, and even getting out of bed. But you can keep your back strong and healthy by doing some exercises. You also can follow a few tips for sitting, sleeping, and lifting to avoid hurting your back again. Talk to your doctor before you start an exercise program. Ask for help if you want to learn more about keeping your back healthy. Follow-up care is a key part of your treatment and safety. Be sure to make and go to all appointments, and call your doctor if you are having problems. It's also a good idea to know your test results and keep a list of the medicines you take. How can you care for yourself at home? · Stay at a healthy weight to avoid strain on your lower back. · Do not smoke. Smoking increases the risk of osteoporosis, which weakens the spine. If you need help quitting, talk to your doctor about stop-smoking programs and medicines. These can increase your chances of quitting for good. · Make sure you sleep in a position that maintains your back's normal curves and on a mattress that feels comfortable. Sleep on your side with a pillow between your knees, or sleep on your back with a pillow under your knees. These positions can reduce strain on your back. · When you get out of bed, lie on your side and bend both knees. Drop your feet over the edge of the bed as you push up with both arms. Scoot to the edge of the bed. Make sure your feet are in line with your rear end (buttocks), and then stand up. · If you must stand for a long time, put one foot on a stool, ledge, or box. Exercise to strengthen your back and other muscles  · Get at least 30 minutes of exercise on most days of the week. Walking is a good choice.  You also may want to do other activities, such as running, swimming, cycling, or playing tennis or team sports. · Stretch your back muscles. Here are few exercises to try:  ? Lie on your back with your knees bent and your feet flat on the floor. Gently pull one bent knee to your chest. Put that foot back on the floor, and then pull the other knee to your chest. Hold for 15 to 30 seconds. Repeat 2 to 4 times. ? Do pelvic tilts. Lie on your back with your knees bent. Tighten your stomach muscles. Pull your belly button (navel) in and up toward your ribs. You should feel like your back is pressing to the floor and your hips and pelvis are slightly lifting off the floor. Hold for 6 seconds while breathing smoothly. · Keep your core muscles strong. The muscles of your back, belly (abdomen), and buttocks support your spine. ? Pull in your belly, and imagine pulling your navel toward your spine. Hold this for 6 seconds, then relax. Remember to keep breathing normally as you tense your muscles. ? Do curl-ups. Always do them with your knees bent. Keep your low back on the floor, and curl your shoulders toward your knees using a smooth, slow motion. Keep your arms folded across your chest. If this bothers your neck, try putting your hands behind your neck (not your head), with your elbows spread apart. ? Lie on your back with your knees bent and your feet flat on the floor. Tighten your belly muscles, and then push with your feet and raise your buttocks up a few inches. Hold this position 6 seconds as you continue to breathe normally, then lower yourself slowly to the floor. Repeat 8 to 12 times. ? If you like group exercise, try Pilates or yoga. These classes have poses that strengthen the core muscles. Protect your back when you sit  · Place a small pillow, a rolled-up towel, or a lumbar roll in the curve of your back if you need extra support. · Sit in a chair that is low enough to let you place both feet flat on the floor with both knees nearly level with your hips.  If your chair or desk is too high, use a foot rest to raise your knees. · When driving, keep your knees nearly level with your hips. Sit straight, and drive with both hands on the steering wheel. Your arms should be in a slightly bent position. · Try a kneeling chair, which helps tilt your hips forward. This takes pressure off your lower back. · Try sitting on an exercise ball. It can rock from side to side, which helps keep your back loose. Lift properly  · Squat down, bending at the hips and knees only. If you need to, put one knee to the floor and extend your other knee in front of you, bent at a right angle (half kneeling). · Press your chest straight forward. This helps keep your upper back straight while keeping a slight arch in your low back. · Hold the load as close to your body as possible, at the level of your navel. · Use your feet to change direction, taking small steps. · Lead with your hips as you change direction. Keep your shoulders in line with your hips as you move. Do not twist your body. · Set down your load carefully, squatting with your knees and hips only. When should you call for help? Watch closely for changes in your health, and be sure to contact your doctor if you have any problems. Where can you learn more? Go to http://www.gray.com/  Enter S810 in the search box to learn more about \"Back Care and Preventing Injuries: Care Instructions. \"  Current as of: July 1, 2021               Content Version: 13.2  © 0466-2939 [employer]. Care instructions adapted under license by [employer] (which disclaims liability or warranty for this information). If you have questions about a medical condition or this instruction, always ask your healthcare professional. [place] [address] any warranty or liability for your use of this information.

## 2022-06-23 PROBLEM — Z90.710 STATUS POST HYSTERECTOMY: Status: ACTIVE | Noted: 2022-06-23

## 2022-07-04 ENCOUNTER — TELEPHONE (OUTPATIENT)
Dept: FAMILY MEDICINE CLINIC | Age: 62
End: 2022-07-04

## 2022-07-04 DIAGNOSIS — E55.9 VITAMIN D DEFICIENCY: ICD-10-CM

## 2022-07-04 RX ORDER — ERGOCALCIFEROL 1.25 MG/1
50000 CAPSULE ORAL
Qty: 5 CAPSULE | Refills: 12 | Status: SHIPPED | OUTPATIENT
Start: 2022-07-04 | End: 2022-10-10

## 2022-07-05 ENCOUNTER — PATIENT OUTREACH (OUTPATIENT)
Dept: CASE MANAGEMENT | Age: 62
End: 2022-07-05

## 2022-07-05 NOTE — PROGRESS NOTES
Ambulatory Care Management Note    Date/Time:  7/5/2022 11:08 AM    This Ambulatory Care Manager (ACM) reviewed and updated the following screenings during this call; general assessment, self management assessment, SDOH assessments and medication reconciliation     Patient's challenges to self-management identified:   lack of knowledge about disease and level of motivation      Medication Management:  good adherence and good understanding    Advance Care Planning:   Does patient have an Advance Directive:  did not discuss this call    [employer], Referrals, and Durable Medical Equipment: Dr Vipin Joshua referred to Ortho for back pain. Health Maintenance Due   Topic Date Due    Shingrix Vaccine Age 49> (1 of 2) Never done         PCP/Specialist follow up:   Future Appointments   Date Time Provider Arun Lee   7/6/2022  8:00 [facility]   7/6/2022  9[facility]45 [facility]   1/16/2023 11:00 AM Tashi Carreon BS AMB      Ambulatory Care Management Note      Date/Time:  7/5/2022 11:09 AM    This patient was received as a referral from Provider. Top Challenges reviewed with the provider   NA             Ambulatory  contacted patient for discussion and case management of Diabetes Management. Summary of patients top problems:    Patient is a diabetic who is managed by Bryan Mojica at [facility]. Patient lat Hgb A1C in march was 7.9  - she tests her BG once a day and also takes Toujeo Insulin 140 units daily,  Glipizide 10mg 2 xday, Glyxambi 25-5mg once in morning. Patient is non compliant with diabetic diet. Goals Addressed                 This Visit's Progress     Diabetes management        07/05/22   Patient with diabetes managed by Willi.  Patient last A1C in march 22 was 7.9.  Patient is compliant with medications but non compliant with diet.  Patient interested in more information on diabetic diet.    I will send via [facility] to patient and she will review over the next 2 weeks.  I will follow up in 2 weeks. VL              Patient verbalized understanding of all information discussed. Patient has this Ambulatory Care Manager's contact information for any further questions, concerns, or needs.

## 2022-07-06 ENCOUNTER — HOSPITAL ENCOUNTER (OUTPATIENT)
Dept: NON INVASIVE DIAGNOSTICS | Age: 62
Discharge: HOME | End: 2022-07-06
Attending: FAMILY MEDICINE
Payer: COMMERCIAL

## 2022-07-06 ENCOUNTER — TELEPHONE (OUTPATIENT)
Dept: CARDIOLOGY CLINIC | Age: 62
End: 2022-07-06

## 2022-07-06 ENCOUNTER — TELEPHONE (OUTPATIENT)
Dept: FAMILY MEDICINE CLINIC | Age: 62
End: 2022-07-06

## 2022-07-06 ENCOUNTER — HOSPITAL ENCOUNTER (OUTPATIENT)
Dept: VASCULAR SURGERY | Age: 62
Discharge: HOME | End: 2022-07-06
Attending: FAMILY MEDICINE
Payer: COMMERCIAL

## 2022-07-06 VITALS
HEIGHT: 67 IN | DIASTOLIC BLOOD PRESSURE: 74 MMHG | WEIGHT: 235 LBS | SYSTOLIC BLOOD PRESSURE: 132 MMHG | BODY MASS INDEX: 36.88 KG/M2

## 2022-07-06 DIAGNOSIS — R94.39 ABNORMAL STRESS TEST: Primary | ICD-10-CM

## 2022-07-06 DIAGNOSIS — R07.9 CHEST PAIN, UNSPECIFIED TYPE: ICD-10-CM

## 2022-07-06 DIAGNOSIS — R20.0 NUMBNESS OF LEFT FOOT: ICD-10-CM

## 2022-07-06 DIAGNOSIS — M79.605 PAIN OF LEFT LOWER EXTREMITY: ICD-10-CM

## 2022-07-06 LAB
LEFT ABI: 1.21
LEFT ARM BP: 137 MMHG
LEFT POSTERIOR TIBIAL: 168 MMHG
LEFT TBI: 1.1
LEFT TOE PRESSURE: 153 MMHG
RIGHT ABI: 1.24
RIGHT ARM BP: 139 MMHG
RIGHT POSTERIOR TIBIAL: 173 MMHG
RIGHT TBI: 1.09
RIGHT TOE PRESSURE: 151 MMHG
STRESS ANGINA INDEX: 0
STRESS BASELINE DIAS BP: 74 MMHG
STRESS BASELINE HR: 65 BPM
STRESS BASELINE SYS BP: 132 MMHG
STRESS ESTIMATED WORKLOAD: 7.6 METS
STRESS EXERCISE DUR MIN: 6 MIN
STRESS EXERCISE DUR SEC: 12 SEC
STRESS PEAK DIAS BP: 70 MMHG
STRESS PEAK SYS BP: 162 MMHG
STRESS PERCENT HR ACHIEVED: 86 %
STRESS POST PEAK HR: 134 BPM
STRESS RATE PRESSURE PRODUCT: NORMAL BPM*MMHG
STRESS SR DUKE TREADMILL SCORE: 1
STRESS ST DEPRESSION: 1 MM
STRESS TARGET HR: 156 BPM
VAS LEFT DORSALIS PEDIS BP: 165 MMHG
VAS RIGHT DORSALIS PEDIS BP: 160 MMHG

## 2022-07-06 PROCEDURE — 93017 CV STRESS TEST TRACING ONLY: CPT

## 2022-07-06 PROCEDURE — 93922 UPR/L XTREMITY ART 2 LEVELS: CPT

## 2022-07-06 PROCEDURE — 93016 CV STRESS TEST SUPVJ ONLY: CPT | Performed by: INTERNAL MEDICINE

## 2022-07-06 PROCEDURE — 93018 CV STRESS TEST I&R ONLY: CPT | Performed by: INTERNAL MEDICINE

## 2022-07-06 NOTE — TELEPHONE ENCOUNTER
Pt just got a call from her heart dr and stated the pcp ordered a nuclear stress test and wants to know why     Call pt at [phone]

## 2022-07-06 NOTE — TELEPHONE ENCOUNTER
Pt came in today for treadmill stress test(ryan Joshua) positive for ischemia, cardiologist wants cardio consult  and Nuclear stress test for further evaluation,

## 2022-07-06 NOTE — TELEPHONE ENCOUNTER
Dr. Tavares Calvin called stating that Dr. Ramirez 75 let her know the ETT was positive and recommends nuclear exercise stress test (she is putting the order in). Asking about getting this scheduled. Let her know we could call pt to get scheduled and give instruction. Pt to hold: none; adjust insulin for fasting.

## 2022-07-06 NOTE — TELEPHONE ENCOUNTER
I put in Cardiology consult and order for nuclear stress test.    Call placed for cardiology. Harry Hall will call pt to set appts.

## 2022-07-15 ENCOUNTER — TELEPHONE (OUTPATIENT)
Dept: CARDIOLOGY CLINIC | Age: 62
End: 2022-07-15

## 2022-07-15 NOTE — TELEPHONE ENCOUNTER
ID verified per protocol. Patient requesting a call back around 11 am (on a conference call). ID verified per protocol. Confirmed appointment(s) for 7-18/20. Arrive @ [facility] [address] @ 1pm. Patient instructed to be NPO x 4 hrs prior, no caffeine (not even decaf coffee,tea,tangela) x 24 hrs prior, wear comfortable clothes/good walking shoes. Patient also informed the test takes 2 hrs. (each day) Patient verbalized understanding and agrees with prep/test.

## 2022-07-18 ENCOUNTER — ANCILLARY PROCEDURE (OUTPATIENT)
Dept: CARDIOLOGY CLINIC | Age: 62
End: 2022-07-18
Payer: COMMERCIAL

## 2022-07-18 VITALS — BODY MASS INDEX: 36.88 KG/M2 | HEIGHT: 67 IN | WEIGHT: 235 LBS

## 2022-07-18 DIAGNOSIS — R94.39 ABNORMAL STRESS TEST: ICD-10-CM

## 2022-07-18 PROCEDURE — 78452 HT MUSCLE IMAGE SPECT MULT: CPT | Performed by: INTERNAL MEDICINE

## 2022-07-18 PROCEDURE — 93015 CV STRESS TEST SUPVJ I&R: CPT | Performed by: INTERNAL MEDICINE

## 2022-07-18 PROCEDURE — A9500 TC99M SESTAMIBI: HCPCS | Performed by: INTERNAL MEDICINE

## 2022-07-18 RX ORDER — TETRAKIS(2-METHOXYISOBUTYLISOCYANIDE)COPPER(I) TETRAFLUOROBORATE 1 MG/ML
40 INJECTION, POWDER, LYOPHILIZED, FOR SOLUTION INTRAVENOUS ONCE
Status: COMPLETED | OUTPATIENT
Start: 2022-07-18 | End: 2022-07-18

## 2022-07-18 RX ADMIN — TETRAKIS(2-METHOXYISOBUTYLISOCYANIDE)COPPER(I) TETRAFLUOROBORATE 25.2 MILLICURIE: 1 INJECTION, POWDER, LYOPHILIZED, FOR SOLUTION INTRAVENOUS at 13:10

## 2022-07-20 ENCOUNTER — PATIENT OUTREACH (OUTPATIENT)
Dept: CASE MANAGEMENT | Age: 62
End: 2022-07-20

## 2022-07-20 ENCOUNTER — APPOINTMENT (OUTPATIENT)
Dept: CARDIOLOGY CLINIC | Age: 62
End: 2022-07-20

## 2022-07-20 RX ORDER — TETRAKIS(2-METHOXYISOBUTYLISOCYANIDE)COPPER(I) TETRAFLUOROBORATE 1 MG/ML
40 INJECTION, POWDER, LYOPHILIZED, FOR SOLUTION INTRAVENOUS ONCE
Status: COMPLETED | OUTPATIENT
Start: 2022-07-20 | End: 2022-07-20

## 2022-07-20 RX ORDER — TETRAKIS(2-METHOXYISOBUTYLISOCYANIDE)COPPER(I) TETRAFLUOROBORATE 1 MG/ML
40 INJECTION, POWDER, LYOPHILIZED, FOR SOLUTION INTRAVENOUS ONCE
OUTPATIENT
Start: 2022-07-20 | End: 2022-07-20

## 2022-07-20 RX ADMIN — TETRAKIS(2-METHOXYISOBUTYLISOCYANIDE)COPPER(I) TETRAFLUOROBORATE 25.3 MILLICURIE: 1 INJECTION, POWDER, LYOPHILIZED, FOR SOLUTION INTRAVENOUS at 13:10

## 2022-07-21 LAB
NUC STRESS EJECTION FRACTION: 72 %
STRESS ANGINA INDEX: 0
STRESS BASELINE DIAS BP: 70 MMHG
STRESS BASELINE HR: 78 BPM
STRESS BASELINE ST DEPRESSION: 0 MM
STRESS BASELINE SYS BP: 130 MMHG
STRESS ESTIMATED WORKLOAD: 10.4 METS
STRESS EXERCISE DUR MIN: 6 MIN
STRESS EXERCISE DUR SEC: 31 SEC
STRESS O2 SAT PEAK: 96 %
STRESS O2 SAT REST: 96 %
STRESS PEAK DIAS BP: 82 MMHG
STRESS PEAK SYS BP: 160 MMHG
STRESS PERCENT HR ACHIEVED: 89 %
STRESS POST PEAK HR: 139 BPM
STRESS RATE PRESSURE PRODUCT: NORMAL BPM*MMHG
STRESS SR DUKE TREADMILL SCORE: 2
STRESS ST DEPRESSION: 1 MM
STRESS TARGET HR: 156 BPM

## 2022-07-23 ENCOUNTER — TELEPHONE (OUTPATIENT)
Dept: FAMILY MEDICINE CLINIC | Age: 62
End: 2022-07-23

## 2022-07-23 NOTE — PROGRESS NOTES
ABN nuclear stress test.  Has upcoming appt to see Dr Corinne Lim 8/3/22. Will ask him if pt needs to be seen sooner. ? Need cath.

## 2022-07-23 NOTE — TELEPHONE ENCOUNTER
I left message for pt to call me back to discuss nuclear stress test.  ABN test. She has appt with Cardiology 8/3/22. Will ask Dr Pastor Jesus if pt needs to be seen sooner for heart cath.

## 2022-07-23 NOTE — TELEPHONE ENCOUNTER
I spoke with pt about her NUC stress test.  I explained that I have sent message to Dr Selma Davies to determine if needs sooner appt. She may hear from his office next week. If she develops CP that is not going away, go to ER.

## 2022-07-27 ENCOUNTER — PATIENT MESSAGE (OUTPATIENT)
Dept: FAMILY MEDICINE CLINIC | Age: 62
End: 2022-07-27

## 2022-07-27 NOTE — TELEPHONE ENCOUNTER
Called patient and let her know that I looke dinto her chart and the cardiology provider stated for her to keep her August 3rd appointment and go to ER for any worsening symptoms. Thank you.

## 2022-07-27 NOTE — TELEPHONE ENCOUNTER
----- Message from Brendan William sent at 7/27/2022 10:19 AM EDT -----  Regarding: Cardiac Dr bacon  Hi Dr Joyce Claros, letting you know that I haven't gotten a call from Dr Bibi Pritchard office  as of yet.  Makes me wonder if maybe we should choose another Cardiologist.

## 2022-07-30 NOTE — PROGRESS NOTES
Vernestine Mortimer, MD., [facility]    [address] [facility] [place], [address] [facility]    Office [phone],[facility] [phone]           Miguel Ángel Lechuga is a 57 y.o. female referred for evaluation of abnormal stress nuc study. Consult requested by Xuan Alonso MD    CC - as documented in EMR    Dear Dr. Xuan Alonso MD    I had the pleasure of seeing Ms. Miguel Ángel Lechuga in the office today. Assessment:   Chest discomfort  Abnormal Stress nuc study  HTN  HLD  Severe obesity        Plan:      Discussed with patient about left heart catheterization. RIBA procedure explained to patient. We will proceed with [facility]. Echocardiogram.  Blood pressure controlled. Aggressive cardiovascular risk factor modifications  Follow-up 3 to 4 months/earlier as needed    Patient understands the plan. All questions were answered to the patient's satisfaction. I appreciate the opportunity to be involved in Ms. Davis. See note below for details. Please do not hesitate to contact us   with questions or concerns. Vernestine Mortimer, MD      Cardiac Testing/ Procedures: A. Cardiac Cath/PCI:    B.ECHO/SADA:    C.StressNuclear/Stress ECHO/Stress test: 7/18/22- Stress nuc - 6 min LVEF measures 72%. There is a mild severity left ventricular stress perfusion defect that is small to medium in size present in the inferolateral segment(s) that is partially reversible. The possibility of artifact cannot be excluded. D.Vascular:    E. EP:    F. Miscellaneous:    History:     Miguel Ángel Lechuga is a 57 y.o. female who is referred for evaluation of abnormal stress test.  She has been having intermittent chest discomfort and was recommended to have stress test.  Unable to do exertional activity secondary to body habitus/deconditioning/chest discomfort/arthralgia. No swelling lower extremities. Mild chronic dyspnea on exertion. Mild chronic swelling lower extremities.       Past Medical/Surgical and Family/Social History:     Past Medical History:   Diagnosis Date    Arthritis     GERD (gastroesophageal reflux disease)     Heart murmur 11/8/2010    Sleep apnea 11/8/2010      Past Surgical History:   Procedure Laterality Date    HX HEENT      uvuloplasty    HX HYSTERECTOMY      HX ORTHOPAEDIC Left     rotator cuff     Family History   Problem Relation Age of Onset    Hypertension Mother     Diabetes Father     Heart Disease Father     Diabetes Maternal Grandmother     Diabetes Paternal Grandmother       Social History     Tobacco Use    Smoking status: Never    Smokeless tobacco: Never   Vaping Use    Vaping Use: Never used   Substance Use Topics    Alcohol use: Yes     Comment: ocassional    Drug use: No            Medications:       Current Outpatient Medications   Medication Sig Dispense    ergocalciferol (ERGOCALCIFEROL) 1,250 mcg (50,000 unit) capsule Take 1 Capsule by mouth every seven (7) days. 5 Capsule    empagliflozin-linagliptin (Glyxambi) 25-5 mg tab take one tablet by mouth once daily in the morning     cyclobenzaprine (FLEXERIL) 5 mg tablet Take 1 Tablet by mouth three (3) times daily as needed for Muscle Spasm(s). Indications: muscle spasm 90 Tablet    glucose blood VI test strips (ASCENSIA AUTODISC VI, ONE TOUCH ULTRA TEST VI) strip One Touch Ultra test strips. Test twice a day. 100 Strip    glipiZIDE (GLUCOTROL) 10 mg tablet TAKE 1 (ONE) TABLET BY MOUTH TWO TIMES A DAY 60 Tablet    losartan (COZAAR) 25 mg tablet Take 1 Tablet by mouth daily. 30 Tablet    pravastatin (PRAVACHOL) 40 mg tablet TAKE ONE TABLET BY MOUTH NIGHTLY. 90 Tablet    Toujeo SoloStar U-300 Insulin 300 unit/mL (1.5 mL) inpn pen INJECT 140 UNITS BY SUBCUTANEOUS ROUTE DAILY. 13.5 mL    omeprazole (PRILOSEC) 20 mg capsule TAKE ONE CAPSULE BY MOUTH ONCE DAILY 30 Cap    BD AUTOSHIELD DUO PEN NEEDLE 30 gauge x 3/16\" ndle      Lancets misc Accu Check Siomara Smart View Fast Click Drum.  DX E11.9 Test blood sugar twice daily 200 Each    Blood-Glucose Meter monitoring kit ONE TOUCH ULTRA. Test once a day. .02 1 kit    aspirin delayed-release 81 mg tablet Take 81 mg by mouth daily. No current facility-administered medications for this visit. Review of Systems:     (bold if positive, if negative)    Gen:  fatigue  Eyes:  ENT:  CVS:  Pulm:  GI:  :  MS:  Pain, arthritis  Skin:  Psych:  Endo:  Hem:  Renal:  Neuro:  Physical Exam:     Visit Vitals  /80 (BP 1 Location: Left upper arm, BP Patient Position: Sitting, BP Cuff Size: Large adult)   Pulse 68   Resp 18   Ht 5' 7\" (1.702 m)   Wt 231 lb (104.8 kg)   LMP 10/14/1987   SpO2 97%   BMI 36.18 kg/m²          Gen: Well-developed, well-nourished, in no acute distress  Neck: Supple,No JVD, No Carotid Bruit,   Resp: No accessory muscle use, Clear breath sounds, No rales or rhonchi  Card: Regular Rate,Rythm,Normal S1, S2, No murmur  Abd:  Soft, BS+,   MSK: No cyanosis  Skin: No rashes    Neuro: moving all four extremities , follows commands appropriately  Psych:  Good insight, oriented to person, place , alert, Nml Affect  LE: No edema    EKG: Sinus rhythm, normal axis, normal intervals      Medication Side Effects and Warnings were discussed with patient: yes  Patient Labs were reviewed and/or requested:  yes  Patient Past Records were reviewed and/or requested: yes    Total time:       mins    ATTENTION:   This medical record was transcribed using an electronic medical records/speech recognition system. Although proofread, it may and can contain electronic, spelling and other errors. Corrections may be executed at a later time. Please feel free to contact us for any clarifications as needed.       Al Torres MD

## 2022-07-30 NOTE — H&P (VIEW-ONLY)
Robyn Valenzuela MD., [facility]    [address] [place], [address] [facility]    Office [phone],N [phone]           Bettie Capps is a 57 y.o. female referred for evaluation of abnormal stress nuc study. Consult requested by Feng Mcdaniels MD    CC - as documented in EMR    Dear Dr. Feng Mcdaniels MD    I had the pleasure of seeing Ms. Bettie Capps in the office today. Assessment:   Chest discomfort  Abnormal Stress nuc study  HTN  HLD  Severe obesity        Plan:      Discussed with patient about left heart catheterization. RIBA procedure explained to patient. We will proceed with [facility]. Echocardiogram.  Blood pressure controlled. Aggressive cardiovascular risk factor modifications  Follow-up 3 to 4 months/earlier as needed    Patient understands the plan. All questions were answered to the patient's satisfaction. I appreciate the opportunity to be involved in Ms. Davis. See note below for details. Please do not hesitate to contact us   with questions or concerns. Robyn Valenzuela MD      Cardiac Testing/ Procedures: A. Cardiac Cath/PCI:    B.ECHO/SADA:    C.StressNuclear/Stress ECHO/Stress test: 7/18/22- Stress nuc - 6 min LVEF measures 72%. There is a mild severity left ventricular stress perfusion defect that is small to medium in size present in the inferolateral segment(s) that is partially reversible. The possibility of artifact cannot be excluded. D.Vascular:    E. EP:    F. Miscellaneous:    History:     Bettie Capps is a 57 y.o. female who is referred for evaluation of abnormal stress test.  She has been having intermittent chest discomfort and was recommended to have stress test.  Unable to do exertional activity secondary to body habitus/deconditioning/chest discomfort/arthralgia. No swelling lower extremities. Mild chronic dyspnea on exertion. Mild chronic swelling lower extremities.       Past Medical/Surgical and Family/Social History:     Past Medical History:   Diagnosis Date    Arthritis     GERD (gastroesophageal reflux disease)     Heart murmur 11/8/2010    Sleep apnea 11/8/2010      Past Surgical History:   Procedure Laterality Date    HX HEENT      uvuloplasty    HX HYSTERECTOMY      HX ORTHOPAEDIC Left     rotator cuff     Family History   Problem Relation Age of Onset    Hypertension Mother     Diabetes Father     Heart Disease Father     Diabetes Maternal Grandmother     Diabetes Paternal Grandmother       Social History     Tobacco Use    Smoking status: Never    Smokeless tobacco: Never   Vaping Use    Vaping Use: Never used   Substance Use Topics    Alcohol use: Yes     Comment: ocassional    Drug use: No            Medications:       Current Outpatient Medications   Medication Sig Dispense    ergocalciferol (ERGOCALCIFEROL) 1,250 mcg (50,000 unit) capsule Take 1 Capsule by mouth every seven (7) days. 5 Capsule    empagliflozin-linagliptin (Glyxambi) 25-5 mg tab take one tablet by mouth once daily in the morning     cyclobenzaprine (FLEXERIL) 5 mg tablet Take 1 Tablet by mouth three (3) times daily as needed for Muscle Spasm(s). Indications: muscle spasm 90 Tablet    glucose blood VI test strips (ASCENSIA AUTODISC VI, ONE TOUCH ULTRA TEST VI) strip One Touch Ultra test strips. Test twice a day. 100 Strip    glipiZIDE (GLUCOTROL) 10 mg tablet TAKE 1 (ONE) TABLET BY MOUTH TWO TIMES A DAY 60 Tablet    losartan (COZAAR) 25 mg tablet Take 1 Tablet by mouth daily. 30 Tablet    pravastatin (PRAVACHOL) 40 mg tablet TAKE ONE TABLET BY MOUTH NIGHTLY. 90 Tablet    Toujeo SoloStar U-300 Insulin 300 unit/mL (1.5 mL) inpn pen INJECT 140 UNITS BY SUBCUTANEOUS ROUTE DAILY. 13.5 mL    omeprazole (PRILOSEC) 20 mg capsule TAKE ONE CAPSULE BY MOUTH ONCE DAILY 30 Cap    BD AUTOSHIELD DUO PEN NEEDLE 30 gauge x 3/16\" ndle      Lancets misc Accu Check Siomara Smart View Fast Click Drum.  DX E11.9 Test blood sugar twice daily 200 Each    Blood-Glucose Meter monitoring kit ONE TOUCH ULTRA. Test once a day. .02 1 kit    aspirin delayed-release 81 mg tablet Take 81 mg by mouth daily. No current facility-administered medications for this visit. Review of Systems:     (bold if positive, if negative)    Gen:  fatigue  Eyes:  ENT:  CVS:  Pulm:  GI:  :  MS:  Pain, arthritis  Skin:  Psych:  Endo:  Hem:  Renal:  Neuro:  Physical Exam:     Visit Vitals  /80 (BP 1 Location: Left upper arm, BP Patient Position: Sitting, BP Cuff Size: Large adult)   Pulse 68   Resp 18   Ht 5' 7\" (1.702 m)   Wt 231 lb (104.8 kg)   LMP 10/14/1987   SpO2 97%   BMI 36.18 kg/m²          Gen: Well-developed, well-nourished, in no acute distress  Neck: Supple,No JVD, No Carotid Bruit,   Resp: No accessory muscle use, Clear breath sounds, No rales or rhonchi  Card: Regular Rate,Rythm,Normal S1, S2, No murmur  Abd:  Soft, BS+,   MSK: No cyanosis  Skin: No rashes    Neuro: moving all four extremities , follows commands appropriately  Psych:  Good insight, oriented to person, place , alert, Nml Affect  LE: No edema    EKG: Sinus rhythm, normal axis, normal intervals      Medication Side Effects and Warnings were discussed with patient: yes  Patient Labs were reviewed and/or requested:  yes  Patient Past Records were reviewed and/or requested: yes    Total time:       mins    ATTENTION:   This medical record was transcribed using an electronic medical records/speech recognition system. Although proofread, it may and can contain electronic, spelling and other errors. Corrections may be executed at a later time. Please feel free to contact us for any clarifications as needed.       Earnestine Carbone MD

## 2022-08-03 ENCOUNTER — OFFICE VISIT (OUTPATIENT)
Dept: CARDIOLOGY CLINIC | Age: 62
End: 2022-08-03
Payer: COMMERCIAL

## 2022-08-03 VITALS
OXYGEN SATURATION: 97 % | HEIGHT: 67 IN | BODY MASS INDEX: 36.26 KG/M2 | SYSTOLIC BLOOD PRESSURE: 130 MMHG | DIASTOLIC BLOOD PRESSURE: 80 MMHG | WEIGHT: 231 LBS | HEART RATE: 68 BPM | RESPIRATION RATE: 18 BRPM

## 2022-08-03 DIAGNOSIS — Z01.818 PREOP TESTING: ICD-10-CM

## 2022-08-03 DIAGNOSIS — R07.9 CHEST PAIN, UNSPECIFIED TYPE: ICD-10-CM

## 2022-08-03 DIAGNOSIS — R94.39 ABNORMAL STRESS TEST: ICD-10-CM

## 2022-08-03 DIAGNOSIS — R94.39 ABNORMAL STRESS TEST: Primary | ICD-10-CM

## 2022-08-03 DIAGNOSIS — R01.1 HEART MURMUR: ICD-10-CM

## 2022-08-03 LAB
ANION GAP SERPL CALC-SCNC: 5 MMOL/L (ref 5–15)
BUN SERPL-MCNC: 14 MG/DL (ref 6–20)
BUN/CREAT SERPL: 14 (ref 12–20)
CALCIUM SERPL-MCNC: 9.3 MG/DL (ref 8.5–10.1)
CHLORIDE SERPL-SCNC: 107 MMOL/L (ref 97–108)
CO2 SERPL-SCNC: 28 MMOL/L (ref 21–32)
CREAT SERPL-MCNC: 1.03 MG/DL (ref 0.55–1.02)
ERYTHROCYTE [DISTWIDTH] IN BLOOD BY AUTOMATED COUNT: 13.5 % (ref 11.5–14.5)
GLUCOSE SERPL-MCNC: 175 MG/DL (ref 65–100)
HCT VFR BLD AUTO: 43.5 % (ref 35–47)
HGB BLD-MCNC: 13.4 G/DL (ref 11.5–16)
MCH RBC QN AUTO: 27 PG (ref 26–34)
MCHC RBC AUTO-ENTMCNC: 30.8 G/DL (ref 30–36.5)
MCV RBC AUTO: 87.7 FL (ref 80–99)
NRBC # BLD: 0 K/UL (ref 0–0.01)
NRBC BLD-RTO: 0 PER 100 WBC
PLATELET # BLD AUTO: 300 K/UL (ref 150–400)
PMV BLD AUTO: 11 FL (ref 8.9–12.9)
POTASSIUM SERPL-SCNC: 4.7 MMOL/L (ref 3.5–5.1)
RBC # BLD AUTO: 4.96 M/UL (ref 3.8–5.2)
SODIUM SERPL-SCNC: 140 MMOL/L (ref 136–145)
WBC # BLD AUTO: 5.7 K/UL (ref 3.6–11)

## 2022-08-03 PROCEDURE — 99204 OFFICE O/P NEW MOD 45 MIN: CPT | Performed by: INTERNAL MEDICINE

## 2022-08-03 PROCEDURE — 93000 ELECTROCARDIOGRAM COMPLETE: CPT | Performed by: INTERNAL MEDICINE

## 2022-08-03 NOTE — PROGRESS NOTES
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 Have you been to the ER, urgent care clinic since your last visit? Hospitalized since your last visit? No    2. Have you seen or consulted any other health care providers outside of the [address] [place] [address] since your last visit? Include any pap smears or colon screening.  No

## 2022-08-03 NOTE — LETTER
8/4/2022    Patient: Jonh Cason   YOB: 1960   Date of Visit: 0/6/2957     Mainor Hector MD  [address] [place] [address]    Dear Mainor Hector MD,      Thank you for referring Ms. Brenda Rivera to [facility] for evaluation. My notes for this consultation are attached. If you have questions, please do not hesitate to call me. I look forward to following your patient along with you.       Sincerely,    Xiao Gan MD

## 2022-08-05 ENCOUNTER — PATIENT OUTREACH (OUTPATIENT)
Dept: CASE MANAGEMENT | Age: 62
End: 2022-08-05

## 2022-08-05 NOTE — PROGRESS NOTES
Goals Addressed                   This Visit's Progress     Attends follow-up appointments as directed. 08/05/22  Patient had cardiologist appt this month and she will have as follows: \"Discussed with patient about left heart catheterization. RIBA procedure explained to patient. We will proceed with [facility]. Echocardiogram.  Blood pressure controlled. Aggressive cardiovascular risk factor modifications  Follow-up 3 to 4 months/earlier as needed\"  Patient with no questions, stating cardiology explained it well. Patient will attend procedure on 8/12/22. I will follow up in 2 weeks. ADRIÁN       Diabetes management        07/05/22  Patient with diabetes managed by Endo. Patient last A1C in march 22 was 7.9. Patient is compliant with medications but non compliant with diet. Patient interested in more information on diabetic diet. I will send via [employer] to patient and she will review over the next 2 weeks. I will follow up in 2 weeks. ADRIÁN    08/05/22  Patient was thankful for the information. She has been focusing on cardiac health since that is what has taken precedence at this time. I will follow up after cardiac procedure to resume diabetes education. I will follow up in 2 weeks.  ADRIÁN

## 2022-08-08 ENCOUNTER — ANCILLARY PROCEDURE (OUTPATIENT)
Dept: CARDIOLOGY CLINIC | Age: 62
End: 2022-08-08
Payer: COMMERCIAL

## 2022-08-08 VITALS
SYSTOLIC BLOOD PRESSURE: 128 MMHG | DIASTOLIC BLOOD PRESSURE: 74 MMHG | BODY MASS INDEX: 36.26 KG/M2 | HEIGHT: 67 IN | WEIGHT: 231 LBS

## 2022-08-08 DIAGNOSIS — E78.00 HYPERCHOLESTEREMIA: ICD-10-CM

## 2022-08-08 DIAGNOSIS — R01.1 MURMUR: ICD-10-CM

## 2022-08-08 LAB
ECHO AO ASC DIAM: 2.5 CM
ECHO AO ASCENDING AORTA INDEX: 1.16 CM/M2
ECHO AO ROOT DIAM: 2.9 CM
ECHO AO ROOT INDEX: 1.35 CM/M2
ECHO AV AREA PEAK VELOCITY: 2.5 CM2
ECHO AV AREA VTI: 2.7 CM2
ECHO AV AREA/BSA PEAK VELOCITY: 1.2 CM2/M2
ECHO AV AREA/BSA VTI: 1.3 CM2/M2
ECHO AV MEAN GRADIENT: 10 MMHG
ECHO AV MEAN VELOCITY: 1.5 M/S
ECHO AV PEAK GRADIENT: 20 MMHG
ECHO AV PEAK VELOCITY: 2.3 M/S
ECHO AV VELOCITY RATIO: 0.61
ECHO AV VTI: 48.8 CM
ECHO EST RA PRESSURE: 3 MMHG
ECHO LA DIAMETER INDEX: 2.09 CM/M2
ECHO LA DIAMETER: 4.5 CM
ECHO LA TO AORTIC ROOT RATIO: 1.55
ECHO LA VOL 2C: 58 ML (ref 22–52)
ECHO LA VOL 4C: 93 ML (ref 22–52)
ECHO LA VOL BP: 79 ML (ref 22–52)
ECHO LA VOL/BSA BIPLANE: 37 ML/M2 (ref 16–34)
ECHO LA VOLUME AREA LENGTH: 82 ML
ECHO LA VOLUME INDEX A2C: 27 ML/M2 (ref 16–34)
ECHO LA VOLUME INDEX A4C: 43 ML/M2 (ref 16–34)
ECHO LA VOLUME INDEX AREA LENGTH: 38 ML/M2 (ref 16–34)
ECHO LV E' LATERAL VELOCITY: 7 CM/S
ECHO LV E' SEPTAL VELOCITY: 6 CM/S
ECHO LV EDV A2C: 88 ML
ECHO LV EDV A4C: 87 ML
ECHO LV EDV BP: 89 ML (ref 56–104)
ECHO LV EDV INDEX A4C: 40 ML/M2
ECHO LV EDV INDEX BP: 41 ML/M2
ECHO LV EDV NDEX A2C: 41 ML/M2
ECHO LV EJECTION FRACTION A2C: 64 %
ECHO LV EJECTION FRACTION A4C: 65 %
ECHO LV EJECTION FRACTION BIPLANE: 65 % (ref 55–100)
ECHO LV ESV A2C: 32 ML
ECHO LV ESV A4C: 30 ML
ECHO LV ESV BP: 31 ML (ref 19–49)
ECHO LV ESV INDEX A2C: 15 ML/M2
ECHO LV ESV INDEX A4C: 14 ML/M2
ECHO LV ESV INDEX BP: 14 ML/M2
ECHO LV FRACTIONAL SHORTENING: 41 % (ref 28–44)
ECHO LV INTERNAL DIMENSION DIASTOLE INDEX: 2.05 CM/M2
ECHO LV INTERNAL DIMENSION DIASTOLIC: 4.4 CM (ref 3.9–5.3)
ECHO LV INTERNAL DIMENSION SYSTOLIC INDEX: 1.21 CM/M2
ECHO LV INTERNAL DIMENSION SYSTOLIC: 2.6 CM
ECHO LV IVSD: 1 CM (ref 0.6–0.9)
ECHO LV MASS 2D: 147.8 G (ref 67–162)
ECHO LV MASS INDEX 2D: 68.8 G/M2 (ref 43–95)
ECHO LV POSTERIOR WALL DIASTOLIC: 1 CM (ref 0.6–0.9)
ECHO LV RELATIVE WALL THICKNESS RATIO: 0.45
ECHO LVOT AREA: 4.2 CM2
ECHO LVOT AV VTI INDEX: 0.68
ECHO LVOT DIAM: 2.3 CM
ECHO LVOT MEAN GRADIENT: 5 MMHG
ECHO LVOT PEAK GRADIENT: 8 MMHG
ECHO LVOT PEAK VELOCITY: 1.4 M/S
ECHO LVOT STROKE VOLUME INDEX: 63.7 ML/M2
ECHO LVOT SV: 137 ML
ECHO LVOT VTI: 33 CM
ECHO MV A VELOCITY: 0.87 M/S
ECHO MV AREA PHT: 3.1 CM2
ECHO MV E DECELERATION TIME (DT): 242.4 MS
ECHO MV E VELOCITY: 0.96 M/S
ECHO MV E/A RATIO: 1.1
ECHO MV E/E' LATERAL: 13.71
ECHO MV E/E' RATIO (AVERAGED): 14.86
ECHO MV E/E' SEPTAL: 16
ECHO MV PRESSURE HALF TIME (PHT): 70.3 MS
ECHO RV INTERNAL DIMENSION: 3.7 CM
ECHO RV TAPSE: 2.5 CM (ref 1.7–?)

## 2022-08-08 PROCEDURE — 93306 TTE W/DOPPLER COMPLETE: CPT | Performed by: INTERNAL MEDICINE

## 2022-08-08 RX ORDER — SODIUM CHLORIDE 0.9 % (FLUSH) 0.9 %
5-40 SYRINGE (ML) INJECTION EVERY 8 HOURS
Status: CANCELLED | OUTPATIENT
Start: 2022-08-12

## 2022-08-08 RX ORDER — SODIUM CHLORIDE 0.9 % (FLUSH) 0.9 %
5-40 SYRINGE (ML) INJECTION AS NEEDED
Status: CANCELLED | OUTPATIENT
Start: 2022-08-12

## 2022-08-08 RX ORDER — SODIUM CHLORIDE 9 MG/ML
75 INJECTION, SOLUTION INTRAVENOUS CONTINUOUS
Status: CANCELLED | OUTPATIENT
Start: 2022-08-12

## 2022-08-12 ENCOUNTER — HOSPITAL ENCOUNTER (OUTPATIENT)
Age: 62
Setting detail: OUTPATIENT SURGERY
Discharge: HOME | End: 2022-08-12
Attending: INTERNAL MEDICINE | Admitting: INTERNAL MEDICINE
Payer: COMMERCIAL

## 2022-08-12 VITALS
BODY MASS INDEX: 36.11 KG/M2 | OXYGEN SATURATION: 99 % | DIASTOLIC BLOOD PRESSURE: 54 MMHG | TEMPERATURE: 97.2 F | HEIGHT: 67 IN | RESPIRATION RATE: 21 BRPM | WEIGHT: 230.1 LBS | SYSTOLIC BLOOD PRESSURE: 136 MMHG | HEART RATE: 59 BPM

## 2022-08-12 DIAGNOSIS — R94.39 ABNORMAL STRESS TEST: ICD-10-CM

## 2022-08-12 LAB
GLUCOSE BLD STRIP.AUTO-MCNC: 128 MG/DL (ref 65–117)
GLUCOSE BLD STRIP.AUTO-MCNC: 151 MG/DL (ref 65–117)
SERVICE CMNT-IMP: ABNORMAL
SERVICE CMNT-IMP: ABNORMAL

## 2022-08-12 PROCEDURE — 77030029065 HC DRSG HEMO QCLOT ZMED -B

## 2022-08-12 PROCEDURE — 74011250636 HC RX REV CODE- 250/636: Performed by: INTERNAL MEDICINE

## 2022-08-12 PROCEDURE — 74011000636 HC RX REV CODE- 636: Performed by: INTERNAL MEDICINE

## 2022-08-12 PROCEDURE — 93458 L HRT ARTERY/VENTRICLE ANGIO: CPT | Performed by: INTERNAL MEDICINE

## 2022-08-12 PROCEDURE — 77030019569 HC BND COMPR RAD TERU -B: Performed by: INTERNAL MEDICINE

## 2022-08-12 PROCEDURE — C1894 INTRO/SHEATH, NON-LASER: HCPCS | Performed by: INTERNAL MEDICINE

## 2022-08-12 PROCEDURE — 77030041064 HC CATH DX ANGI DXTRTY MEDT -B: Performed by: INTERNAL MEDICINE

## 2022-08-12 PROCEDURE — 74011000250 HC RX REV CODE- 250: Performed by: INTERNAL MEDICINE

## 2022-08-12 PROCEDURE — 2709999900 HC NON-CHARGEABLE SUPPLY: Performed by: INTERNAL MEDICINE

## 2022-08-12 PROCEDURE — 82962 GLUCOSE BLOOD TEST: CPT

## 2022-08-12 PROCEDURE — 99152 MOD SED SAME PHYS/QHP 5/>YRS: CPT | Performed by: INTERNAL MEDICINE

## 2022-08-12 PROCEDURE — C1769 GUIDE WIRE: HCPCS | Performed by: INTERNAL MEDICINE

## 2022-08-12 RX ORDER — SODIUM CHLORIDE 0.9 % (FLUSH) 0.9 %
5-40 SYRINGE (ML) INJECTION EVERY 8 HOURS
Status: DISCONTINUED | OUTPATIENT
Start: 2022-08-12 | End: 2022-08-12 | Stop reason: HOSPADM

## 2022-08-12 RX ORDER — LIDOCAINE HYDROCHLORIDE 10 MG/ML
INJECTION INFILTRATION; PERINEURAL AS NEEDED
Status: DISCONTINUED | OUTPATIENT
Start: 2022-08-12 | End: 2022-08-12 | Stop reason: HOSPADM

## 2022-08-12 RX ORDER — VERAPAMIL HYDROCHLORIDE 2.5 MG/ML
INJECTION, SOLUTION INTRAVENOUS AS NEEDED
Status: DISCONTINUED | OUTPATIENT
Start: 2022-08-12 | End: 2022-08-12 | Stop reason: HOSPADM

## 2022-08-12 RX ORDER — MIDAZOLAM HYDROCHLORIDE 1 MG/ML
INJECTION, SOLUTION INTRAMUSCULAR; INTRAVENOUS AS NEEDED
Status: DISCONTINUED | OUTPATIENT
Start: 2022-08-12 | End: 2022-08-12 | Stop reason: HOSPADM

## 2022-08-12 RX ORDER — SODIUM CHLORIDE 9 MG/ML
75 INJECTION, SOLUTION INTRAVENOUS CONTINUOUS
Status: DISCONTINUED | OUTPATIENT
Start: 2022-08-12 | End: 2022-08-12 | Stop reason: HOSPADM

## 2022-08-12 RX ORDER — SODIUM CHLORIDE 0.9 % (FLUSH) 0.9 %
5-40 SYRINGE (ML) INJECTION AS NEEDED
Status: DISCONTINUED | OUTPATIENT
Start: 2022-08-12 | End: 2022-08-12 | Stop reason: HOSPADM

## 2022-08-12 RX ORDER — HEPARIN SODIUM 200 [USP'U]/100ML
INJECTION, SOLUTION INTRAVENOUS
Status: COMPLETED | OUTPATIENT
Start: 2022-08-12 | End: 2022-08-12

## 2022-08-12 RX ORDER — HEPARIN SODIUM 1000 [USP'U]/ML
INJECTION, SOLUTION INTRAVENOUS; SUBCUTANEOUS AS NEEDED
Status: DISCONTINUED | OUTPATIENT
Start: 2022-08-12 | End: 2022-08-12 | Stop reason: HOSPADM

## 2022-08-12 RX ORDER — FENTANYL CITRATE 50 UG/ML
INJECTION, SOLUTION INTRAMUSCULAR; INTRAVENOUS AS NEEDED
Status: DISCONTINUED | OUTPATIENT
Start: 2022-08-12 | End: 2022-08-12 | Stop reason: HOSPADM

## 2022-08-12 RX ADMIN — SODIUM CHLORIDE 75 ML/HR: 9 INJECTION, SOLUTION INTRAVENOUS at 08:52

## 2022-08-12 RX ADMIN — SODIUM CHLORIDE 75 ML/HR: 9 INJECTION, SOLUTION INTRAVENOUS at 10:29

## 2022-08-12 NOTE — Clinical Note
TRANSFER - OUT REPORT:     Verbal report given to: TOMAS Jordan. Report consisted of patient's Situation, Background, Assessment and   Recommendations(SBAR). Opportunity for questions and clarification was provided. Patient transported with a Registered Nurse. Patient transported to: recovery.    CLPO

## 2022-08-12 NOTE — INTERVAL H&P NOTE
Update History & Physical    The Patient's History and Physical of 8/3/22  was reviewed with the patient and I examined the patient. There was no change. Plan:  The risk, benefits, expected outcome, and alternative to the recommended procedure have been discussed with the patient. Patient understands and wants to proceed with the procedure.     Electronically signed by Chani Perkins MD on 8/12/2022 at 9:24 AM

## 2022-08-12 NOTE — DISCHARGE INSTRUCTIONS
Radial Cardiac Catheterization/Angiography Discharge Instructions    It is normal to feel tired the first couple days. Take it easy and follow the physicians instructions. CHECK THE CATHETER INSERTION SITE DAILY:    Remove the wrist dressing 24 hours after the procedure. You may shower 24 hours after the procedure. Wash with soap and water and pat dry. Gentle cleaning of the site with soap and water is sufficient, cover with a dry clean dressing or bandage. Do not apply creams or powders to the area. No soaking the wrist for 3 days. Leave the puncture site open to air after 24 hours post-procedure. CALL THE PHYSICIANS:     If the site becomes red, swollen or feels warm to the touch  If there is bleeding or drainage or if there is unusual pain at the radial site. If there is any minor oozing, you may apply a band-aid and remove after 12 hours. If the bleeding continues, hold pressure with the middle finger against the puncture site and the thumb against the back of the wrist,call 911 to be transported to the hospital.  DO NOT DRIVE YOURSELF, [place] [address]. ACTIVITY:   For the first 24 hours do not manipulate the wrist.  No lifting, pushing or pulling over 3-5 pounds with the affected wrist for 7 daysand no straining the insertion site. Do not life grocery bags or the garbage can, do not run the vacuum  or  for 7 days. Start with short walks as in the hospital and gradually increase as tolerated each day. It is recommended to walk 30 minutes 5-7 days per week. Follow your physicians instructions on activity. Avoid walking outside in extremes of heat or cold. Walk inside when it is cold and windy or hot and humid. Things to keep in mind:  No driving for at least 24 hours, or as designated by your physician. Limit the number of times you go up and down the stairs  Take rests and pace yourself with activity.   Be careful and do not strain with bowel movements. MEDICATIONS:    Take all medications as prescribed  Call your physician if you have any questions  Keep an updated list of your medications with you at all times and give a list to your physician and pharmacist    SIGNS AND SYMPTOMS:   Be cautious of symptoms of angina or recurrent symptoms such as chest discomfort, unusual shortness of breath or fatigue. These could be symptoms of restenosis, a new blockage or a heart attack. If your symptoms are relieved with rest it is still recommended that you notify your physician of recurrent chest pain or discomfort. For CHEST PAIN or symptoms of angina not relieved with rest:  If the discomfort is not relieved with rest, and you have been prescribed Nitroglycerin, take as directed (taken under the tongue, one at a time 5 minutes apart for a total of 3 doses). If the discomfort is not relieved after the 3rd nitroglycerin, call 911. If you have not been prescribed Nitroglycerin  and your chest discomfort is not relieved with rest, call 911. AFTER CARE:   Follow up with your physician as instructed. Follow a heart healthy diet with proper portion control, daily stress management, daily exercise, blood pressure and cholesterol control , and smoking cessation.

## 2022-08-12 NOTE — PROCEDURES
BRIEF PROCEDURE NOTE    Date of Procedure: 8/12/2022   Preoperative Diagnosis: Abnormal stress test  Postoperative Diagnosis: Non obstructive CAD  Procedure: Left heart cath, LV angiography, coronary angiography  Interventional Cardiologist: Sohail Jain MD  Assistant : none  Anesthesia: local + IV sedation  I administered moderate sedation throughout this procedure. An independent trained observer pushed medications at my direction, and monitored the patients level of consciousness and physiological status throughout. Estimated Blood Loss: Minimal    Access: R Radial Access - 6 F sheath        Catheters: RCA : JR4                    LCA : JL4    Findings:     L Main: Med to large; Short; Nml    LAD: Med; Prox 30% ) at D1; D1 -small to med; Ostial/prox 40%; D2 - small; MLI    LCflex: med to large; Very small OM1; Med OM2 - MLI    RCA: Dominant; Med to Large; MLI; PDA and PLB - small    LVEDP: 11 mm Hg    LVEF: Not assessed    No significant gradient across aortic valve. PCI: none        Specimens Removed : None    Devices implanted : None    Complications: None    Closure Device:  R Radial - TR band        See full cath note.     Complications: none    Sohail Jain MD

## 2022-08-12 NOTE — Clinical Note
TRANSFER - IN REPORT:     Verbal report received from: Radha Chapman. Report consisted of patient's Situation, Background, Assessment and   Recommendations(SBAR). Opportunity for questions and clarification was provided. Assessment completed upon patient's arrival to unit and care assumed. Patient transported with a Registered Nurse.  TRENT

## 2022-08-12 NOTE — ROUTINE PROCESS
Received patient from waiting area. Armband and allergies verbally confirmed with patient. Procedure explained and consents signed. 0935: TRANSFER - OUT REPORT:    Verbal report given to [facility] RN(name) on Cesar Stark  being transferred to cath lab(unit) for ordered procedure       Report consisted of patients Situation, Background, Assessment and   Recommendations(SBAR). Information from the following report(s) SBAR was reviewed with the receiving nurse. Lines:   Peripheral IV 08/12/22 Left Antecubital (Active)   Site Assessment Clean, dry, & intact 08/12/22 0845   Phlebitis Assessment 0 08/12/22 0845   Infiltration Assessment 0 08/12/22 0845   Dressing Status Clean, dry, & intact 08/12/22 0845   Dressing Type Transparent 08/12/22 0845   Hub Color/Line Status Blue;Flushed; Infusing 08/12/22 0845        Opportunity for questions and clarification was provided. Patient transported with:   Registered Nurse    [phone]: TRANSFER - IN REPORT:    Verbal report received from [facility] RN (name) on Cesar Stark  being received from cath lab(unit) for routine post - op      Report consisted of patients Situation, Background, Assessment and   Recommendations(SBAR). Information from the following report(s) Procedure Summary was reviewed with the receiving nurse. Opportunity for questions and clarification was provided. Assessment completed upon patients arrival to unit and care assumed. 1020: Patient received from cath lab. Patient with TR band to right radial site. Site clean, dry and intact. No hematoma. Pulses palpable. VS stable. Patient with no complaints at this time. 1125: 2 ml air released from TR Band. No bleeding or hematoma noted. Radial and Ulnar pulse on right wrist palpable. Pt tolerated well. Will continue to monitor. 1130: 3 ml air released from TR Band. Bleeding noted. Air reinstilled. 1135[de-identified] Discharge instructions and prescriptions reviewed with patient. Opportunity provided for questions. Patient verbalized understanding. Signed copy of discharge placed in the front of patient's chart. 1200: 2 ml air released from TR Band. No bleeding or hematoma noted. Radial and Ulnar pulse on right wrist palpable. Pt tolerated well. Will continue to monitor. 1205: 3 ml air released from TR Band. No bleeding or hematoma noted. Radial and Ulnar pulse on right wrist palpable. Pt tolerated well. Will continue to monitor. 1210: 3 ml air released from TR Band. No bleeding or hematoma noted. Radial and Ulnar pulse on right wrist palpable. Pt tolerated well. Will continue to monitor. 1215: 3 ml air released from TR Band. No bleeding or hematoma noted. Radial and Ulnar pulse on right wrist palpable. Pt tolerated well. Will continue to monitor. 1216: Air release completed. TR Band removed from right wrist. No bleeding or  Hematoma. Dressing applied. Wrist immobilizer in place. Radial and ulnar pulse remain palpable on affected extremity. Pt tolerated well. Instructions given to pt regarding movement and activity restrictions. Pt voiced understanding. 1220: Patient dangled on the side of the bed. Site CDI. No complaints. 1222: Patient ambulated to the bathroom. Voided. Gait steady. No complaints    1235: Discharge instructions and prescriptions reviewed with patient's daughter. Opportunity provided for questions. Daughter verbalized understanding. 1306: IV and tele removed. 1318: Patient escorted via wheelchair to entrance. Patient daughter driving. Patient discharged into care of daughter.

## 2022-08-22 ENCOUNTER — PATIENT OUTREACH (OUTPATIENT)
Dept: CASE MANAGEMENT | Age: 62
End: 2022-08-22

## 2022-08-31 ENCOUNTER — TELEPHONE (OUTPATIENT)
Dept: FAMILY MEDICINE CLINIC | Age: 62
End: 2022-08-31

## 2022-08-31 NOTE — TELEPHONE ENCOUNTER
Pt called and says she is having chest discomfort regularly ever since her procedure. Started about 3 days after and is becoming more frequent A few times she has had some fatigue, sweating and upper back pain.  Would like a call back if possible    [phone]

## 2022-08-31 NOTE — TELEPHONE ENCOUNTER
Had heart catheretization on August 12th, and patient still continues to have chest discomfort. Fatigue, sweating and back pain. Patient is scared that something is wrong with heart. She stated that she had stress test done as well, and is being followed by cardiology. She called them yesterday and they have not returned her call. She would like advise on what she should do. Her follow up with them is in October for her heart cath.

## 2022-08-31 NOTE — TELEPHONE ENCOUNTER
Ms Wale Stafford was able to get in touch with her cardiologist and has an appt scheduled for tomorrow 9/1/22.  She does not need a call back

## 2022-09-01 ENCOUNTER — OFFICE VISIT (OUTPATIENT)
Dept: CARDIOLOGY CLINIC | Age: 62
End: 2022-09-01
Payer: COMMERCIAL

## 2022-09-01 VITALS
SYSTOLIC BLOOD PRESSURE: 138 MMHG | HEIGHT: 67 IN | BODY MASS INDEX: 36.88 KG/M2 | WEIGHT: 235 LBS | HEART RATE: 68 BPM | DIASTOLIC BLOOD PRESSURE: 78 MMHG | OXYGEN SATURATION: 93 %

## 2022-09-01 DIAGNOSIS — E66.01 SEVERE OBESITY (HCC): ICD-10-CM

## 2022-09-01 DIAGNOSIS — R07.9 CHEST PAIN, UNSPECIFIED TYPE: Primary | ICD-10-CM

## 2022-09-01 DIAGNOSIS — E78.00 HYPERCHOLESTEREMIA: ICD-10-CM

## 2022-09-01 DIAGNOSIS — N18.30 STAGE 3 CHRONIC KIDNEY DISEASE, UNSPECIFIED WHETHER STAGE 3A OR 3B CKD (HCC): ICD-10-CM

## 2022-09-01 LAB
CREATININE, EXTERNAL: 1.09
HBA1C MFR BLD HPLC: 7.4 %

## 2022-09-01 PROCEDURE — 99214 OFFICE O/P EST MOD 30 MIN: CPT | Performed by: INTERNAL MEDICINE

## 2022-09-01 NOTE — PROGRESS NOTES
Flako Khan is a 57 y.o. female    Chief Complaint   Patient presents with    Follow-up     Cath, heart Murmur    Chest Pain     Some sweating and back pain     Chest pain slight CP    SOB no;fatigue    Dizziness no    Swelling no    Refills no    Visit Vitals  /78 (BP 1 Location: Left upper arm, BP Patient Position: Sitting)   Pulse 68   Ht 5' 7\" (1.702 m)   Wt 235 lb (106.6 kg)   LMP 10/14/1987   SpO2 93%   BMI 36.81 kg/m²       1. Have you been to the ER, urgent care clinic since your last visit? Hospitalized since your last visit? No    2. Have you seen or consulted any other health care providers outside of the [address] [place] [address] since your last visit? Include any pap smears or colon screening.  No

## 2022-09-01 NOTE — PROGRESS NOTES
Robyn Valenzuela MD., [address] [facility]    [address] [place], [address] [facility]    Office [phone],[facility] [phone]           Bettie Capps is here for a f/u office visit. Primary care physician:  Feng Mcdaniels MD    CC - as documented in EMR    Dear Dr. Feng Mcdaniels MD    I had the pleasure of seeing Ms. Bettie Capps in the office today. Assessment:     Atypical chest pain  History of GERD-on PPI  LVH on echo  Elevated blood pressures  Diabetes mellitus  Obesity    Plan:      Advised to monitor blood pressure at home and if systolic blood pressure greater than 130, may need to increase Cozaar to 50 mg daily. Recent cardiac catheterization-no significant CAD. Chest pain-could be musculoskeletal etiology/GERD  Advised to exercise/lose weight  Aggressive cardiovascular risk factor modification  Follow-up 6 months/earlier as needed    Patient understands the plan. All questions were answered to the patient's satisfaction. I appreciate the opportunity to be involved in Ms. Davis. See note below for details. Please do not hesitate to contact us with questions or concerns. Robyn Valenzuela MD      Cardiac Testing/ Procedures: A. Cardiac Cath/PCI: 8/12/2022  Access: R Radial Access - 6 F sheath        Catheters: RCA : [email or web address]                    LCA : Marilyn Levy    Findings:     L Main: Med to large; Short; Nml    LAD: Med; Prox 30% ) at D1; D1 -small to med; Ostial/prox 40%; D2 - small; MLI    LCflex: med to large; Very small OM1; Med OM2 - MLI    RCA: Dominant; Med to Large; MLI; PDA and PLB - small    LVEDP: 11 mm Hg    LVEF: Not assessed    No significant gradient across aortic valve. PCI: none        Specimens Removed : None    Devices implanted : None    Complications: None    Closure Device:  R Radial - TR band      B.ECHO/SADA:8/8/22 echo    Left Ventricle: Normal left ventricular systolic function with a visually estimated EF of 60 - 65%.  EF by 2D Simpsons Biplane is 65%. Left ventricle size is normal. Increased wall thickness. Findings consistent with concentric hypertrophy. Normal wall motion. Normal diastolic function. Left Atrium: Left atrium is mildly dilated. LA Vol Index A/L is 38 mL/m2. C.StressNuclear/Stress ECHO/Stress test: 7/18/2022-abnormal stress nuclear study    D. Vascular: 7/2022 - Nml KAYLYN    E. EP:    F. Miscellaneous:    History:     Ana Ramirez is a 57 y.o. female who returns for follow up visit. Has been having atypical chest discomfort radiating to the back. Can occur with or without exertion. Mild chronic dyspnea on exertion. She states that she is on Cozaar for renal protection secondary to diabetes and she has not been diagnosed with hypertension    Noswelling LE/palpitaitons    ROS:  (bold if positive, if negative)           Medications:       Current Outpatient Medications   Medication Sig Dispense    OTHER,NON-FORMULARY, daily as needed. Black seed twice a day     ergocalciferol (ERGOCALCIFEROL) 1,250 mcg (50,000 unit) capsule Take 1 Capsule by mouth every seven (7) days. 5 Capsule    empagliflozin-linagliptin (Glyxambi) 25-5 mg tab take one tablet by mouth once daily in the morning     cyclobenzaprine (FLEXERIL) 5 mg tablet Take 1 Tablet by mouth three (3) times daily as needed for Muscle Spasm(s). Indications: muscle spasm 90 Tablet    glucose blood VI test strips (ASCENSIA AUTODISC VI, ONE TOUCH ULTRA TEST VI) strip One Touch Ultra test strips. Test twice a day. 100 Strip    glipiZIDE (GLUCOTROL) 10 mg tablet TAKE 1 (ONE) TABLET BY MOUTH TWO TIMES A DAY 60 Tablet    losartan (COZAAR) 25 mg tablet Take 1 Tablet by mouth daily. 30 Tablet    pravastatin (PRAVACHOL) 40 mg tablet TAKE ONE TABLET BY MOUTH NIGHTLY. 90 Tablet    Toujeo SoloStar U-300 Insulin 300 unit/mL (1.5 mL) inpn pen INJECT 140 UNITS BY SUBCUTANEOUS ROUTE DAILY.  13.5 mL    omeprazole (PRILOSEC) 20 mg capsule TAKE ONE CAPSULE BY MOUTH ONCE DAILY 30 Cap    BD AUTOSHIELD DUO PEN NEEDLE 30 gauge x 3/16\" ndle      Lancets misc Accu Check Siomara Smart View Fast Click Drum. DX E11.9 Test blood sugar twice daily 200 Each    Blood-Glucose Meter monitoring kit ONE TOUCH ULTRA. Test once a day. .02 1 kit    aspirin delayed-release 81 mg tablet Take 81 mg by mouth daily. No current facility-administered medications for this visit. Family History of CAD:    Yes    Social History:  Current  Smoker  No    Physical Exam:     Visit Vitals  /78 (BP 1 Location: Left upper arm, BP Patient Position: Sitting)   Pulse 68   Ht 5' 7\" (1.702 m)   Wt 235 lb (106.6 kg)   LMP 10/14/1987   SpO2 93%   BMI 36.81 kg/m²          Gen: Well-developed, well-nourished, in no acute distress, obese  Neck: Supple,No JVD, No Carotid Bruit,   Resp: No accessory muscle use, Clear breath sounds, No rales or rhonchi  Card: Regular Rate,Rythm,Normal S1, S2, No murmurs, rubs or gallop. No thrills. Abd:  Soft, BS+,   MSK: No cyanosis  Skin: No rashes    Neuro: moving all four extremities , follows commands appropriately  Psych:  Good insight, oriented to person, place , alert, Nml Affect  LE: No edema    EKG:       Medication Side Effects and Warnings were discussed with patient: yes  Patient Labs were reviewed and/or requested:  yes  Patient Past Records were reviewed and/or requested: yes    Total time :        mins    ATTENTION:   This medical record was transcribed using an electronic medical records/speech recognition system. Although proofread, it may and can contain electronic, spelling and other errors. Corrections may be executed at a later time. Please feel free to contact us for any clarifications as needed.       Noel Flannery MD

## 2022-09-01 NOTE — LETTER
9/1/2022    Patient: Raf Lee   YOB: 1960   Date of Visit: 7/4/6856     Starla Alarcon MD  [address] [place] [address]    Dear Starla Alarcon MD,      Thank you for referring Ms. Darrel Vázquez to [facility] for evaluation. My notes for this consultation are attached. If you have questions, please do not hesitate to call me. I look forward to following your patient along with you.       Sincerely,    Chani Perkins MD

## 2022-09-08 ENCOUNTER — PATIENT OUTREACH (OUTPATIENT)
Dept: CASE MANAGEMENT | Age: 62
End: 2022-09-08

## 2022-09-12 ENCOUNTER — DOCUMENTATION ONLY (OUTPATIENT)
Dept: FAMILY MEDICINE CLINIC | Age: 62
End: 2022-09-12

## 2022-09-16 ENCOUNTER — PATIENT OUTREACH (OUTPATIENT)
Dept: CASE MANAGEMENT | Age: 62
End: 2022-09-16

## 2022-10-05 DIAGNOSIS — E78.00 HYPERCHOLESTEROLEMIA: ICD-10-CM

## 2022-10-05 RX ORDER — PRAVASTATIN SODIUM 40 MG/1
TABLET ORAL
Qty: 90 TABLET | Refills: 1 | Status: SHIPPED | OUTPATIENT
Start: 2022-10-05

## 2022-10-10 ENCOUNTER — PATIENT OUTREACH (OUTPATIENT)
Dept: CASE MANAGEMENT | Age: 62
End: 2022-10-10

## 2022-10-10 NOTE — PROGRESS NOTES
Patient has graduated from the Complex Case Management  program on 10/10/22  . Unable to contact patient further. Care management goals have been completed. No further Ambulatory Care Manager follow up scheduled. Goals Addressed                   This Visit's Progress     COMPLETED: Attends follow-up appointments as directed. 08/05/22  Patient had cardiologist appt this month and she will have as follows: \"Discussed with patient about left heart catheterization. RIBA procedure explained to patient. We will proceed with [facility]. Echocardiogram.  Blood pressure controlled. Aggressive cardiovascular risk factor modifications  Follow-up 3 to 4 months/earlier as needed\"  Patient with no questions, stating cardiology explained it well. Patient will attend procedure on 8/12/22. I will follow up in 2 weeks. VL       COMPLETED: Diabetes management        07/05/22  Patient with diabetes managed by Endo. Patient last A1C in march 22 was 7.9. Patient is compliant with medications but non compliant with diet. Patient interested in more information on diabetic diet. I will send via Fetch MD to patient and she will review over the next 2 weeks. I will follow up in 2 weeks. VL    08/05/22  Patient was thankful for the information. She has been focusing on cardiac health since that is what has taken precedence at this time. I will follow up after cardiac procedure to resume diabetes education. I will follow up in 2 weeks.                Patient has Ambulatory Care Manager's contact information for any further questions, concerns, or needs.   Patients upcoming visits:    Future Appointments   Date Time Provider Arun Lee   1/16/2023 11:00 AM Tashi Lee   3/3/2023  2:00 PM Amanda Pickett MD [facility]

## 2022-10-26 DIAGNOSIS — R80.9 CONTROLLED TYPE 2 DIABETES MELLITUS WITH MICROALBUMINURIA, WITH LONG-TERM CURRENT USE OF INSULIN (HCC): ICD-10-CM

## 2022-10-26 DIAGNOSIS — Z79.4 CONTROLLED TYPE 2 DIABETES MELLITUS WITH MICROALBUMINURIA, WITH LONG-TERM CURRENT USE OF INSULIN (HCC): ICD-10-CM

## 2022-10-26 DIAGNOSIS — E11.29 CONTROLLED TYPE 2 DIABETES MELLITUS WITH MICROALBUMINURIA, WITH LONG-TERM CURRENT USE OF INSULIN (HCC): ICD-10-CM

## 2022-10-26 RX ORDER — GLIPIZIDE 10 MG/1
TABLET ORAL
Qty: 120 TABLET | Refills: 5 | Status: SHIPPED | OUTPATIENT
Start: 2022-10-26

## 2022-12-27 DIAGNOSIS — Z79.4 CONTROLLED TYPE 2 DIABETES MELLITUS WITH MICROALBUMINURIA, WITH LONG-TERM CURRENT USE OF INSULIN (HCC): ICD-10-CM

## 2022-12-27 DIAGNOSIS — R80.9 CONTROLLED TYPE 2 DIABETES MELLITUS WITH MICROALBUMINURIA, WITH LONG-TERM CURRENT USE OF INSULIN (HCC): ICD-10-CM

## 2022-12-27 DIAGNOSIS — E11.29 CONTROLLED TYPE 2 DIABETES MELLITUS WITH MICROALBUMINURIA, WITH LONG-TERM CURRENT USE OF INSULIN (HCC): ICD-10-CM

## 2022-12-27 RX ORDER — LOSARTAN POTASSIUM 25 MG/1
TABLET ORAL
Qty: 90 TABLET | Refills: 1 | Status: SHIPPED | OUTPATIENT
Start: 2022-12-27

## 2023-01-16 ENCOUNTER — OFFICE VISIT (OUTPATIENT)
Dept: NEUROLOGY | Age: 63
End: 2023-01-16
Payer: COMMERCIAL

## 2023-01-16 DIAGNOSIS — R41.3 MEMORY LOSS: Primary | ICD-10-CM

## 2023-01-16 PROCEDURE — 90791 PSYCH DIAGNOSTIC EVALUATION: CPT | Performed by: CLINICAL NEUROPSYCHOLOGIST

## 2023-01-16 NOTE — PROGRESS NOTES
Intake Note      Patient Name: Lilly Cleaning  YOB: 1960    Age: 57 y.o. Date of Intake: 1/16/2023   Education: 14 Ethnicity Black   Gender: Female Referring Provider: Dr. Homar Calles:  Lilly Cleaning  was referred for evaluation by her Primary care provider to assist in differential diagnosis and individualized treatment planning. she understood the rationale and procedures for evaluation, as well as the limits to confidentiality, and agreed to participate. she consented to have this report made available to her  treating providers through her  electronic medical records. History Sources: Patient and Medical Records    HISTORY OF PRESENT ILLNESS:  The patient is a 75-year-old female with medical history significant for sleep apnea (untreated), vitamin D deficiency, hypercholesterolemia, type 2 diabetes mellitus, and stage III chronic renal disease. She presented apparently for clinical interview and was able to provide adequate history. Per the patient report, for the past several years, she has experienced gradually progressive cognitive difficulties. She specifically reported the presence of short-term memory problems, which she described to include completing a task at work, moving onto the next task, and forgetting whether she finished the first step. She also reported occasionally forgetting details from conversations but stated recognition cues are generally helpful. The patient indicated that while her cognitive difficulties have reduced her efficiency at work and at home, she remains functionally independent. Pertaining to the patient's psychiatric history, she denied previous clinically significant symptomatology, diagnosis, or treatment. She described her recent mood to be \"typical.\"  She denied history of auditory or visual hallucinations and she denied history of passive or active suicidal ideation, intent, or plan.     PERTINENT MEDICAL HISTORY:  Patient Active Problem List   Diagnosis Code    Sleep apnea G47.30    Heart murmur R01.1    Iritis H20.9    DDD (degenerative disc disease), cervical M50.30    Vitamin D deficiency E55.9    GERD (gastroesophageal reflux disease) K21.9    Thyroid goiter E04.9    Hypercholesterolemia E78.00    Osteopenia M85.80    Uncontrolled type 2 diabetes mellitus with diabetic cataract, with long-term current use of insulin [id number]    Nuclear cataract [id number]    Open angle with borderline findings and low glaucoma risk in both eyes H40.013    Encounter for long-term current use of medication Z79.899    Severe obesity ([facility] [address] E66.01    Type 2 diabetes with nephropathy ([facility] [address] [address] E11.21    Type 2 diabetes mellitus E11.9    Status post hysterectomy Z90.710    Abnormal stress test R94.39    Chronic renal disease, stage III N18.30      Pertaining to the patient's other medical and physical functioning, she described her sleep to be \"messed up. \"  The patient reported she has been diagnosed with sleep apnea but has not used her CPAP \"in a while. \"  She was uncertain about the severity of her sleep apnea but stated she knows she had a \"scary amount\" of apnea events during the sleep study. The patient reported she typically goes to bed between 11:00 PM and 12:00 AM.  She wakes up at 5:30 AM 2 days/week and 7:45 AM the other days per week. The patient stated her sleep is not consistent and is disrupted by sounds and needing to use the restroom. The patient also reported experiencing tingling and numbness in her upper and lower extremities, which she attributed to diabetic neuropathy. She denied the presence of other physical problems potentially suggestive of parkinsonism, autonomic dysfunction, or sensory loss. Family neurological history: Positive for CVA in the patient's maternal aunt. Positive for dementia in the patient's maternal uncle.     Current Outpatient Medications:     losartan (COZAAR) 25 mg tablet, TAKE 1 (ONE) TABLET BY MOUTH ONCE DAILY, Disp: 90 Tablet, Rfl: 1    glipiZIDE (GLUCOTROL) 10 mg tablet, TAKE 1 (ONE) TABLET BY MOUTH TWO TIMES A DAY, Disp: 120 Tablet, Rfl: 5    ergocalciferol (Vitamin D2) 1,250 mcg (50,000 unit) capsule, TAKE 1 CAPSULE BY MOUTH TWICE A WEEK, Disp: 24 Capsule, Rfl: 0    pravastatin (PRAVACHOL) 40 mg tablet, TAKE ONE TABLET BY MOUTH NIGHTLY., Disp: 90 Tablet, Rfl: 1    OTHER,NON-FORMULARY,, daily as needed. Black seed twice a day, Disp: , Rfl:     empagliflozin-linagliptin (Glyxambi) 25-5 mg tab, take one tablet by mouth once daily in the morning, Disp: , Rfl:     cyclobenzaprine (FLEXERIL) 5 mg tablet, Take 1 Tablet by mouth three (3) times daily as needed for Muscle Spasm(s). Indications: muscle spasm, Disp: 90 Tablet, Rfl: 3    glucose blood VI test strips (ASCENSIA AUTODISC VI, ONE TOUCH ULTRA TEST VI) strip, One Touch Ultra test strips. Test twice a day., Disp: 100 Strip, Rfl: 3    Toujeo SoloStar U-300 Insulin 300 unit/mL (1.5 mL) inpn pen, INJECT 140 UNITS BY SUBCUTANEOUS ROUTE DAILY. , Disp: 13.5 mL, Rfl: 5    omeprazole (PRILOSEC) 20 mg capsule, TAKE ONE CAPSULE BY MOUTH ONCE DAILY, Disp: 30 Cap, Rfl: 5    BD AUTOSHIELD DUO PEN NEEDLE 30 gauge x 3/16\" ndle, , Disp: , Rfl:     Lancets misc, Accu Check Siomara Smart View Fast Click Drum. DX E11.9 Test blood sugar twice daily, Disp: 200 Each, Rfl: 3    Blood-Glucose Meter monitoring kit, ONE TOUCH ULTRA. Test once a day. .02, Disp: 1 kit, Rfl: 0    aspirin delayed-release 81 mg tablet, Take 81 mg by mouth daily. , Disp: , Rfl:     Pertinent Neurological History:  Seizure: Never  Stroke: Never  TBI: Never    Neurodiagnostic Findings:  Imaging: CT head (1/6/2017) was interpreted as \"normal.\"    Pertinent Labs:  Lab Date Result   Vitamin D 6/22/2022 Within reference range     PSYCHOSOCIAL HISTORY:  Birth/Development: The patient was born and raised in [place].   Language: English  Education: Graduated high school and earned an associates degree  Academic Problems: Denied. 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 Daughter and grandchild are also living with her. Patient stated she also has a \"living partner. \"  Legal: Denied    Substance Use:  Alcohol: Rare/social.  No history of moderate or heavy consumption  Nicotine: Denied  Recreational/Illicit Substances: Denied  Treatment: Denied    BEHAVIORAL OBSERVATIONS:  Appearance: Casually dressed, Well-groomed, and Appeared stated age  Orientation: Person, Place, Time, and Situation  Motor: Ambulated independently, Adequate gait, Adequate posture, No involuntary movements, and Adequate strength  Thought Processes: Clear, Coherent, Intact, Logical, and Organized  Hearing and Vision: Adequate  Speech: shows no evidence of impairment  Comprehension: Adequate  Interpersonal Skills: Adequate  Affect: Appropriate  Ability as Historian: Adequate  Insight: Adequate  Judgment: Adequate    STRENGTHS:  Exercising self-direction/Resourceful, Access to housing/residential stability, and Interpersonal/supportive relationships (family, friends, peers)    WEAKNESSES:  Health problems and Cognitive limitations    IMPRESSION:  The patient is a pleasant 55-year-old woman who presents with concerns regarding her cognitive functioning. She was an adequate historian and at this time, the patient's cognitive difficulties are believed to be attributable to her untreated obstructive sleep apnea and poor sleep quality. The objective presence of the patient's cognitive difficulties, the severity of her difficulties, and her pattern of strengths and weaknesses are unknown. The degree to which the patient's cognitive difficulties are due to normal aging versus psychiatric factors versus an organic etiology requires further clarification.   Comprehensive evaluation will assist with obtaining a quantitative assessment of the patient's cognitive and emotional functioning in order to guide differential diagnosis and treatment planning.     ASSESSMENT:  Memory loss: R41.3    PLAN:  Patient will participate in comprehensive evaluation in order to obtain a quantitative assessment of their cognitive and emotional functioning  Differential diagnosis and treatment planning will be based upon results from clinical interview and objective testing  Patient will be provided with review of results, impressions, and recommendations during feedback session  Patient will be encouraged to follow-up with referring provider for ongoing management    TIME DOCUMENTATION:  Clinical Interview: 1100 - 1120 = 20 minutes    [id number]

## 2023-01-27 ENCOUNTER — TELEPHONE (OUTPATIENT)
Dept: NEUROLOGY | Age: 63
End: 2023-01-27

## 2023-02-03 ENCOUNTER — OFFICE VISIT (OUTPATIENT)
Dept: NEUROLOGY | Age: 63
End: 2023-02-03
Payer: COMMERCIAL

## 2023-02-03 DIAGNOSIS — G31.84 MILD COGNITIVE IMPAIRMENT: Primary | ICD-10-CM

## 2023-02-13 ENCOUNTER — TELEPHONE (OUTPATIENT)
Dept: NEUROLOGY | Age: 63
End: 2023-02-13

## 2023-02-14 NOTE — TELEPHONE ENCOUNTER
Regarding: [place]- rx concern   ----- Message from Jb Wylie sent at 5/28/2022 12:13 PM CDT -----  Patient Name: Efren Mobley    Full Name of Provider seen for current symptoms:Dr. Kristin Carlos    Symptoms: [facility] pharmacy is closed this weekend, needs prescriptions:ibuprofen (MOTRIN) 600 MG tablet & HYDROcodone-acetaminophen ([address]) 5-325 MG per tablet to be sent to [facility] on [address] [place] -- pt says they will be open today and tomorrow     Pregnant (If Yes, how long?): NA    Call Back #: [phone]    Call Center Account # for provider seen for current symptoms: [facility] are you currently located in? (enter State name in Summary field): [place]    Patients needing callback from the RN are informed of the following:   Please be aware the return phone call may come from an unidentified phone number and also keep in mind that call back times vary based on call volumes. Â  If your condition becomes life threatening while you wait for a callback, you should seek immediate medical assistance by calling 911 or going to the Emergency Department for evaluation. Returned calls and r/s appt with Dr Arslan Way

## 2023-02-23 NOTE — PROGRESS NOTES
Neuropsychological Evaluation Report      Patient Name: Dmitriy Barbour  YOB: 1960    Age: 57 y.o. Date of Intake: 2023   Education: 14 Date of Testin/3/2023   Gender: Female Ethnicity: Black     Referring Provider: Dr. Angie Hanna:  Dmitriy Barbour  was referred for neuropsychological evaluation by her Primary care provider to obtain a quantitative assessment of her current level of neurocognitive functioning, assist in differential diagnosis, and aid in individualized treatment planning. The patient understood the rationale and procedures for evaluation, as well as the limits to confidentiality, and they agreed to participate. The patient consented to have this report made available to her  treating providers through her  electronic medical records. This evaluation was completed with the patient by Kole Styles PsyD with the exception of testing by technician, which was completed by CHRISTINA Donohue under the supervision of Dr. Ebony Starks. History Sources: Patient, Medical Records, Rating Scales, and Assessment Instruments    SUMMARY AND IMPRESSION:  The following section is a summary of the patient's pertinent test results and impressions. A more thorough review of the patient's background and test scores can be found below. Cognitive testing revealed variable but generally mild impairments (~1.5 standard deviations below the mean) on measures of visuospatial construction and free recall memory. Aspects of expressive language (i.e., confrontation naming), learning, and recognition/discrimination were also noted for performances in the below expectation range but at the domain level were inefficient (~1 standard deviation below the mean). Mental processing speed, attention/working memory, and executive functioning were largely within normal limits and were commensurate with expectations of premorbid functioning.   Simulated functional testing was preserved. Brief assessment of psychopathology revealed mild symptoms of depression; however, the symptoms were more physically related. Screening for sleep problems revealed clinically significant poor sleep quality. Diagnostically, the patient's overall cognitive functioning and her report of preserved daily functioning indicate she meets criteria for the diagnosis of mild cognitive impairment. Regarding the etiology of the patient's cognitive difficulties, her  tendency to rush through testing is believed to have contributed to her variable performances. However, she is also believed to be experiencing genuine mild cognitive dysfunction that is likely related to her poor sleep and significant untreated obstructive sleep apnea. At this time, concern for the early stages of a neurodegenerative condition is low. ASSESSMENT:  Mild cognitive impairment    RECOMMENDATIONS:  The patient currently has a feedback session scheduled for 2/28/2023. During this appointment, the results of the evaluation will be reviewed, recommendations will be offered (see below), and the patient will be provided with the opportunity to ask questions. The patient will be encouraged to review the results of this evaluation with her providers and discuss potential implications for treatment. Specifically:  Rule out of potentially reversible contributory factors may assist in refining differential diagnosis. For this reason, if warranted and not already considered, obtaining the following labs may be beneficial: B12, Folate, TSH, & T4 Free. An updated MRI of the brain would likely assist with diagnosis  The patient reported her previous sleep study revealed a \"scary amount\" of apnea events but she does not wear a CPAP. She would likely benefit from education regarding the benefits and risks related to untreated obstructive sleep apnea (e.g., increased risk for stroke and cognitive dysfunction).   If necessary, the patient may require an updated sleep study. The patient reported a history of problematic sleep and testing revealed clinically significant poor sleep. The patient may also benefit from short-term behaviorally oriented therapy to help improve sleep and management of medical conditions. Specifically:   Sleep Hygiene Recommendations  Avoid caffeine within 4-6 hours of bedtime  Avoid the use of nicotine close to bedtime or during the night  Do not drink alcoholic beverages within 4-6 hours of bedtime  While a light snack can promote sound sleep, avoid large meals 2-3 hours before bedtime  Minimize light, noise, and extreme temperature in the bedroom. Stimulus Control Recommendations:   Lie down at night - only when sleepy  Use the bed only for actual sleep (or sex) - not reading, watching TV, etc.  Get out of bed if you wake up at night & cannot fall back asleep, return to bed when sleepy;  do not remain in bed awake for longer than 10 - 15 minutes  Avoid napping during the day  Adhere to a strict morning rising time, regardless of how much sleep you got the night before  The patient may find that it is helpful to use practical compensatory strategies to reduce the impact of cognitive difficulties. Examples of such strategies include:  Keep a steady routine environment and using visual cues, such as whiteboards, to help provide reminders of tasks. Take additional time to complete tasks and limit the amount of external distracters in the environment when having to focus on a task;  Break larger tasks into smaller ones and take frequent, regularly scheduled breaks;   Write down information as soon as possible and to utilize a day planner/calendar, especially when recording doctor's appointments and medicine regimens;  Development of a memory book may be beneficial. Memory books typically contain a day planner/calendar component; however, they also contain organized sections for recording frequently asked questions and information about routines that the patient will be able to access and use independently. Pair behaviors or items to be remembered with well learned patterns or routines. For example, place medications by your toothbrush so that brushing your teeth will cue you to take your medication; and,  Designate a memory place in which you keep all of your important personal belongings (e.g. wallet, keys) when not in use. The patient will be provided with psychoeducation regarding empirically determined modifiable risk and protective factors for cognitive decline. Specifically:  The patient will be encouraged to engage in approximately 2.5 hours of physician approved physical activity on a weekly basis. The patient will be encouraged to maintain a healthy diet. they will also be informed of the Mediterranean diet, which has been associated with reduced risk for neurodegenerative conditions as well as heart disease. The patient will be encouraged to maintain a cognitively stimulated lifestyle, also incorporating social interaction. If not already addressed, the patient and her family are encouraged to discuss legal issues such as power of  to assist the patient in future financial and healthcare decisions. Information regarding these issues can be found at www.caringinfo. org or www.agingwithdignity.org/5wishes.html  I would like to see the patient for follow-up evaluation in approximately 12 months in order to monitor her progress and update treatment planning. HISTORY OF PRESENT ILLNESS:  The patient is a 58-year-old female with medical history significant for sleep apnea (untreated), vitamin D deficiency, hypercholesterolemia, type 2 diabetes mellitus, and stage III chronic renal disease. She presented independently for clinical interview and was able to provide adequate history. Per the patient report, for the past several years, she has experienced gradually progressive cognitive difficulties. She specifically reported the presence of short-term memory problems, which she described to include completing a task at work, moving onto the next task, and forgetting whether she finished the first step. She also reported occasionally forgetting details from conversations but stated recognition cues are generally helpful. The patient indicated that while her cognitive difficulties have reduced her efficiency at work and at home, she remains functionally independent. Pertaining to the patient's psychiatric history, she denied previous clinically significant symptomatology, diagnosis, or treatment. She described her recent mood to be \"typical.\"  She denied history of auditory or visual hallucinations and she denied history of passive or active suicidal ideation, intent, or plan. PERTINENT MEDICAL HISTORY:       Patient Active Problem List   Diagnosis Code    Sleep apnea G47.30    Heart murmur R01.1    Iritis H20.9    DDD (degenerative disc disease), cervical M50.30    Vitamin D deficiency E55.9    GERD (gastroesophageal reflux disease) K21.9    Thyroid goiter E04.9    Hypercholesterolemia E78.00    Osteopenia M85.80    Uncontrolled type 2 diabetes mellitus with diabetic cataract, with long-term current use of insulin [id number]    Nuclear cataract [id number]    Open angle with borderline findings and low glaucoma risk in both eyes H40.013    Encounter for long-term current use of medication Z79.899    Severe obesity ([address] E66.01    Type 2 diabetes with nephropathy ([address] E11.21    Type 2 diabetes mellitus E11.9    Status post hysterectomy Z90.710    Abnormal stress test R94.39    Chronic renal disease, stage III N18.30      Pertaining to the patient's other medical and physical functioning, she described her sleep to be \"messed up. \"  The patient reported she has been diagnosed with sleep apnea but has not used her CPAP \"in a while. \"  She was uncertain about the severity of her sleep apnea but stated she knows she had a \"scary amount\" of apnea events during the sleep study. The patient reported she typically goes to bed between 11:00 PM and 12:00 AM.  She wakes up at 5:30 AM 2 days/week and 7:45 AM the other days per week. The patient stated her sleep is not consistent and is disrupted by sounds and needing to use the restroom. The patient also reported experiencing tingling and numbness in her upper and lower extremities, which she attributed to diabetic neuropathy. She denied the presence of other physical problems potentially suggestive of parkinsonism, autonomic dysfunction, or sensory loss. Family neurological history: Positive for CVA in the patient's maternal aunt. Positive for dementia in the patient's maternal uncle. Current Outpatient Medications:     losartan (COZAAR) 25 mg tablet, TAKE 1 (ONE) TABLET BY MOUTH ONCE DAILY, Disp: 90 Tablet, Rfl: 1    glipiZIDE (GLUCOTROL) 10 mg tablet, TAKE 1 (ONE) TABLET BY MOUTH TWO TIMES A DAY, Disp: 120 Tablet, Rfl: 5    ergocalciferol (Vitamin D2) 1,250 mcg (50,000 unit) capsule, TAKE 1 CAPSULE BY MOUTH TWICE A WEEK, Disp: 24 Capsule, Rfl: 0    pravastatin (PRAVACHOL) 40 mg tablet, TAKE ONE TABLET BY MOUTH NIGHTLY., Disp: 90 Tablet, Rfl: 1    OTHER,NON-FORMULARY,, daily as needed. Black seed twice a day, Disp: , Rfl:     empagliflozin-linagliptin (Glyxambi) 25-5 mg tab, take one tablet by mouth once daily in the morning, Disp: , Rfl:     cyclobenzaprine (FLEXERIL) 5 mg tablet, Take 1 Tablet by mouth three (3) times daily as needed for Muscle Spasm(s). Indications: muscle spasm, Disp: 90 Tablet, Rfl: 3    glucose blood VI test strips (ASCENSIA AUTODISC VI, ONE TOUCH ULTRA TEST VI) strip, One Touch Ultra test strips. Test twice a day., Disp: 100 Strip, Rfl: 3    Toujeo SoloStar U-300 Insulin 300 unit/mL (1.5 mL) inpn pen, INJECT 140 UNITS BY SUBCUTANEOUS ROUTE DAILY. , Disp: 13.5 mL, Rfl: 5    omeprazole (PRILOSEC) 20 mg capsule, TAKE ONE CAPSULE BY MOUTH ONCE DAILY, Disp: 30 Cap, Rfl: 5    BD AUTOSHIELD DUO PEN NEEDLE 30 gauge x 3/16\" ndle, , Disp: , Rfl:     Lancets misc, Accu Check Siomara Smart View Fast Click Drum. DX E11.9 Test blood sugar twice daily, Disp: 200 Each, Rfl: 3    Blood-Glucose Meter monitoring kit, ONE TOUCH ULTRA. Test once a day. .02, Disp: 1 kit, Rfl: 0    aspirin delayed-release 81 mg tablet, Take 81 mg by mouth daily. , Disp: , Rfl:      Pertinent Neurological History:  Seizure: Never  Stroke: Never  TBI: Never     Neurodiagnostic Findings:  Imaging: CT head (1/6/2017) was interpreted as \"normal.\"     Pertinent Labs:  Lab Date Result   Vitamin D 6/22/2022 Within reference range      PSYCHOSOCIAL HISTORY:  Birth/Development: The patient was born and raised in [place]. Language: English  Education: Graduated high school and earned an associates degree  Academic Problems: Denied. Typically earned \"B's and C's.\"  Occupation: Primarily works in [employer]. Also has a part-time job 2 nights per week   Service: None  Housing: Private residence. Daughter and grandchild are also living with her. Patient stated she also has a \"living partner. \"  Legal: Denied     Substance Use:  Alcohol: Rare/social.  No history of moderate or heavy consumption  Nicotine: Denied  Recreational/Illicit Substances: Denied  Treatment: Denied     BEHAVIORAL OBSERVATIONS:  Appearance: Casually dressed, Well-groomed, and Appeared stated age  Orientation: Person, Place, Time, and Situation. Able to provide details related to recent events.   Motor: Ambulated independently, Adequate gait, Adequate posture, No involuntary movements, and Adequate strength  Thought Processes: Clear, Coherent, Intact, Logical, and Organized  Hearing and Vision: Adequate  Speech: shows no evidence of impairment  Comprehension: Adequate  Interpersonal Skills: Adequate  Affect: Appropriate  Ability as Historian: Adequate  Insight: Adequate  Judgment: Adequate  Testing Behaviors: Rapport was adequately established between the patient and the examiner, though the patient appeared to avoid eye contact during testing. The patient remained alert and focused during testing. Occasional word finding difficulties were observed as well as the occasional paraphasic error (e.g.,\"fundel\" instead of funnel\"). The patient maintained a cooperative attitude; however, she appeared nervous throughout testing and seemingly rushed through some assigned measures. TESTING  Measures administered by provider:  Test of Premorbid Functioning (TOPF); Clock drawing test; Patient Health Questionnaire - 9 (PHQ-9); and Generalized Anxiety Disorder - 7 (YESSENIA-7). Measures administered by technician:  DCT; Wechsler Adult Intelligence Scale - Fourth Edition (WAIS-IV: Select Components); Trail Making Test A&B; Melany-Daniel Executive Function System (D-KEFS: Select Components); Modified [employer] (M-WCST); Judgment of Line Orientation (Maria De Jesus Santiago); Jonathan Complex Figure Test - Copy (RCFT: Copy); Verbal Fluency (FAS & Animals); Neuropsychological Assessment Battery (NAB: Select Language Components); Sanford Verbal Learning Test - Revised (HVLT-R); Brief Visuospatial Memory Test - Revised (BVMT-R); Wechsler Memory Scale - Fourth Edition (WMS-IV: Select Components); Pillbox Test; Test of Practical Judgment (TOP-J: Form B); [place] Sleepiness Scale (ESS); and Beaver Sleep Quality Inventory (PSQI). Results:   For standardized tests, performance was compared to a demographically matched sample of neurocognitively healthy adults using the following classification system to indicate deviation from mean (or average) test performance:    Normally Distributed Not-Normally Distributed   Descriptor Percentile Descriptor Percentile   \"Exceptionally High\" >97th \"Within Normal Limits\" >24th   \"Above Average\" 91st - 97th \"Low Average\" 9th - 24th   \"High Average\" 75th - 90th \"Below Average\" 2nd - 8th   \"Average\" 25th - 74th \"Exceptionally Low\" <2nd   \"Low Average\" 9th - 24th    \"Below Average\" 2nd - 8th    \"Exceptionally Low\" <2nd      Performance and symptom validity are analyzed in a number of ways, including administration of neuropsychological measures that have been empirically shown to identify suboptimal performance or purposeful exaggeration. These tests and their scores have been redacted from this report in order to ensure test security, but are available to formally trained neuropsychologists upon request. In addition, when possible, the overall pattern of performance is analyzed for consistency between measures, consistency with the expected severity of impairment, and the presenting symptoms are compared against base rates of symptoms in other patients with similar problems. The patient's performances were within acceptable limits, indicating the results of the present evaluation are believed to be valid and reliable. Premorbid Functioning:   Average  Attention:   Brief auditory attention: Average  Auditory working memory: Average  Processing Speed:  Low average to average but generally in the average range  Executive Functioning:   Mental set switching: Average; 1 set loss error  Problem Solving: Average  Inhibition: Average for speed and accuracy  Inhibition/Switching: High average for speed and accuracy  Visuospatial:  Basic visuoperception: Low average  Clock drawing: Within normal limits  Pattern reconstruction: Low average  Complex figure copy: Exceptionally low. While the gestalt of the patient's reconstruction generally reflected the target stimulus, she utilized a piecemeal approach resulting in considerable disorganization and detail errors. Language:  Confrontation naming: Exceptionally low. The patient's responses were generally within the correct semantic category and she benefited from semantic or phonemic cueing.   Letter fluency: Average  Semantic Fluency: Average  Learning   List learning: Exceptionally low  Story learning: High average  Basic figure learning: Below average  Progressively complex figure learning: Average  Memory:  List recall: Exceptionally low  Story recall: Low average  Basic figure recall: Below average  Progressively complex figure recall: Low average  Recognition:  List recognition: Within normal limits  Story recognition: Exceptionally low  Basic figure recognition: Within normal limits  Progressively complex figure recognition: Low average  Functional:   Pillbox organization: Pass  Bill pay: Average  Practical judgment: Exceptionally low  Psychiatric/Sleep:   Depression: Mild; however, the symptoms were primarily related to sleep and fatigue rather than emotional disturbance. Anxiety: Within normal limits  Daytime sleepiness: Elevated  Sleep quality: Elevated. Patient reported difficulties initiating and sustaining sleep which she largely attributed to needing to use the bathroom and not being able to breathe comfortably.     TIME:  Clinical Interview: 1100 - 1120 = 20 minutes  Testing and scoring by provider: 16 minutes  Testing by technician: 7637 - 0114 = 102 minutes  Scoring by technician: 34 minutes    BILLING:  98285 x 1 Unit  96136 x 1 Unit  96138 x 1 Unit  96139 x 4 Units

## 2023-02-24 PROBLEM — G31.84 MILD COGNITIVE IMPAIRMENT: Status: ACTIVE | Noted: 2023-02-24

## 2023-02-28 ENCOUNTER — VIRTUAL VISIT (OUTPATIENT)
Dept: NEUROLOGY | Age: 63
End: 2023-02-28
Payer: COMMERCIAL

## 2023-02-28 DIAGNOSIS — G31.84 MILD COGNITIVE IMPAIRMENT: Primary | ICD-10-CM

## 2023-02-28 PROCEDURE — 96132 NRPSYC TST EVAL PHYS/QHP 1ST: CPT | Performed by: CLINICAL NEUROPSYCHOLOGIST

## 2023-02-28 PROCEDURE — 96133 NRPSYC TST EVAL PHYS/QHP EA: CPT | Performed by: CLINICAL NEUROPSYCHOLOGIST

## 2023-02-28 NOTE — PROGRESS NOTES
Pursuant to the emergency declaration under the [facility], [facility] waiver authority and the [employer] Act, this Virtual Visit was conducted, with appropriate consent obtained, to reduce the patient's risk of exposure to COVID-19 and provide continuity of care   Services were provided in this manner to substitute for in-person clinic visit. The originating site is the patient's home and the distance site is [facility]. These types of teleneuropsychology/telehealth/telemedicine visits were authorized by the President of the [place], though I/we cannot guarantee what a third party payor will do reimbursement/coverage wise. I indicated that I would evaluate the patient and recommend diagnostics and treatment based on my assessment and impressions, and that our sessions are not being recorded and that personal health information is protected to the best of our abilities. Prior to seeing the patient I reviewed pertinent records, including the previously completed report, the records in [facility], and any updated visits from other providers since the patient's last visit. Today, I engaged in a psychoeducational and supportive feedback session with the patient. I provided psychotherapy in the form of psychoeducation and support with respect to the results of the recent Neuropsychological Evaluation, including discussing individual tests as well as patient's areas of neurocognitive strength versus weakness. We discussed, in detail, the following:  Testing revealed mild impairments across several domains  Patient meets criteria for diagnosis of mild cognitive impairment  Cognitive dysfunction is believed to be related to untreated obstructive sleep apnea and poor sleep. Concern for early stages of neurodegenerative condition is low.   Reviewed recommendations outlined in report  Answered questions to the best of my ability    Education was provided regarding my diagnostic impressions, and we discussed treatment plan/options. I also answered numerous questions related to the clinical findings, including discussing various methods to improve cognition and mood. Supportive/Cognitive Behavioral/Solution Focused psychotherapy provided. The patient needs to: Follow-up with referring provider for ongoing management  Improved sleep using behavioral interventions  Utilize practical compensatory strategies for cognitive difficulties  Emphasize modifiable protective behaviors for cognitive functioning (e.g., exercise, diet, and cognitive stimulation  Follow-up in 12 months      TIME:  Clinical Interview: 1100 - 1120 = 20 minutes  Testing and scoring by provider: 16 minutes  Testing by technician: 0163 - 9990 = 102 minutes  Scoring by technician: 34 minutes  Neuropsychological testing evaluation services*: 154 minutes + 10 minutes feedback = 164 minutes total    BILLING:  68295 x 1 Unit  96136 x 1 Unit  96138 x 1 Unit  96139 x 4 Units  96132 x 1 Unit  96133 x 2 Units    *Neuropsychological testing evaluation services include: Integration of patient data, interpretation of standardized test results and clinical data, clinical decision-making, treatment planning and report, and interactive feedback to the patient, family member(s) or caregiver(s), when performed.

## 2023-05-04 DIAGNOSIS — R80.9 CONTROLLED TYPE 2 DIABETES MELLITUS WITH MICROALBUMINURIA, WITH LONG-TERM CURRENT USE OF INSULIN (HCC): ICD-10-CM

## 2023-05-04 DIAGNOSIS — Z79.4 CONTROLLED TYPE 2 DIABETES MELLITUS WITH MICROALBUMINURIA, WITH LONG-TERM CURRENT USE OF INSULIN (HCC): ICD-10-CM

## 2023-05-04 DIAGNOSIS — E11.29 CONTROLLED TYPE 2 DIABETES MELLITUS WITH MICROALBUMINURIA, WITH LONG-TERM CURRENT USE OF INSULIN (HCC): ICD-10-CM

## 2023-05-04 DIAGNOSIS — E78.00 HYPERCHOLESTEROLEMIA: ICD-10-CM

## 2023-05-04 RX ORDER — PRAVASTATIN SODIUM 40 MG/1
TABLET ORAL
Qty: 90 TABLET | Refills: 0 | Status: SHIPPED | OUTPATIENT
Start: 2023-05-04

## 2023-05-04 RX ORDER — LOSARTAN POTASSIUM 25 MG/1
TABLET ORAL
Qty: 90 TABLET | Refills: 0 | Status: SHIPPED | OUTPATIENT
Start: 2023-05-04

## 2023-06-28 RX ORDER — INSULIN GLARGINE 300 U/ML
INJECTION, SOLUTION SUBCUTANEOUS
Qty: 13.5 ML | Refills: 5 | Status: SHIPPED | OUTPATIENT
Start: 2023-06-28

## 2023-06-30 ENCOUNTER — OFFICE VISIT (OUTPATIENT)
Age: 63
End: 2023-06-30
Payer: COMMERCIAL

## 2023-06-30 VITALS
SYSTOLIC BLOOD PRESSURE: 146 MMHG | HEART RATE: 67 BPM | BODY MASS INDEX: 35.47 KG/M2 | DIASTOLIC BLOOD PRESSURE: 72 MMHG | OXYGEN SATURATION: 94 % | RESPIRATION RATE: 20 BRPM | WEIGHT: 226 LBS | HEIGHT: 67 IN | TEMPERATURE: 97.9 F

## 2023-06-30 DIAGNOSIS — Z23 NEED FOR SHINGLES VACCINE: ICD-10-CM

## 2023-06-30 DIAGNOSIS — E55.9 VITAMIN D DEFICIENCY, UNSPECIFIED: ICD-10-CM

## 2023-06-30 DIAGNOSIS — E66.01 SEVERE OBESITY (BMI 35.0-39.9) WITH COMORBIDITY (HCC): ICD-10-CM

## 2023-06-30 DIAGNOSIS — E11.42 DM TYPE 2 WITH DIABETIC PERIPHERAL NEUROPATHY (HCC): ICD-10-CM

## 2023-06-30 DIAGNOSIS — Z00.00 ROUTINE ADULT HEALTH MAINTENANCE: Primary | ICD-10-CM

## 2023-06-30 DIAGNOSIS — Z79.4 TYPE 2 DIABETES MELLITUS WITH DIABETIC NEPHROPATHY, WITH LONG-TERM CURRENT USE OF INSULIN (HCC): ICD-10-CM

## 2023-06-30 DIAGNOSIS — Z12.31 ENCOUNTER FOR SCREENING MAMMOGRAM FOR MALIGNANT NEOPLASM OF BREAST: ICD-10-CM

## 2023-06-30 DIAGNOSIS — N18.30 STAGE 3 CHRONIC KIDNEY DISEASE, UNSPECIFIED WHETHER STAGE 3A OR 3B CKD (HCC): ICD-10-CM

## 2023-06-30 DIAGNOSIS — E11.21 TYPE 2 DIABETES MELLITUS WITH DIABETIC NEPHROPATHY, WITH LONG-TERM CURRENT USE OF INSULIN (HCC): ICD-10-CM

## 2023-06-30 PROBLEM — E11.9 TYPE 2 DIABETES MELLITUS (HCC): Status: RESOLVED | Noted: 2022-06-21 | Resolved: 2023-06-30

## 2023-06-30 PROCEDURE — 99397 PER PM REEVAL EST PAT 65+ YR: CPT | Performed by: FAMILY MEDICINE

## 2023-06-30 RX ORDER — BLOOD SUGAR DIAGNOSTIC
STRIP MISCELLANEOUS
COMMUNITY
Start: 2022-06-22

## 2023-06-30 RX ORDER — LOSARTAN POTASSIUM 50 MG/1
50 TABLET ORAL DAILY
Qty: 90 TABLET | Refills: 1 | Status: SHIPPED | OUTPATIENT
Start: 2023-06-30

## 2023-06-30 RX ORDER — EMPAGLIFLOZIN 25 MG/1
TABLET, FILM COATED ORAL
COMMUNITY
Start: 2023-06-28

## 2023-06-30 RX ORDER — ZOSTER VACCINE RECOMBINANT, ADJUVANTED 50 MCG/0.5
0.5 KIT INTRAMUSCULAR SEE ADMIN INSTRUCTIONS
Qty: 0.5 ML | Refills: 1 | Status: SHIPPED | OUTPATIENT
Start: 2023-06-30 | End: 2023-12-27

## 2023-06-30 SDOH — ECONOMIC STABILITY: FOOD INSECURITY: WITHIN THE PAST 12 MONTHS, YOU WORRIED THAT YOUR FOOD WOULD RUN OUT BEFORE YOU GOT MONEY TO BUY MORE.: NEVER TRUE

## 2023-06-30 SDOH — ECONOMIC STABILITY: FOOD INSECURITY: WITHIN THE PAST 12 MONTHS, THE FOOD YOU BOUGHT JUST DIDN'T LAST AND YOU DIDN'T HAVE MONEY TO GET MORE.: NEVER TRUE

## 2023-06-30 SDOH — ECONOMIC STABILITY: HOUSING INSECURITY
IN THE LAST 12 MONTHS, WAS THERE A TIME WHEN YOU DID NOT HAVE A STEADY PLACE TO SLEEP OR SLEPT IN A SHELTER (INCLUDING NOW)?: NO

## 2023-06-30 SDOH — ECONOMIC STABILITY: INCOME INSECURITY: HOW HARD IS IT FOR YOU TO PAY FOR THE VERY BASICS LIKE FOOD, HOUSING, MEDICAL CARE, AND HEATING?: NOT HARD AT ALL

## 2023-06-30 ASSESSMENT — PATIENT HEALTH QUESTIONNAIRE - PHQ9
SUM OF ALL RESPONSES TO PHQ QUESTIONS 1-9: 0
1. LITTLE INTEREST OR PLEASURE IN DOING THINGS: 0
SUM OF ALL RESPONSES TO PHQ QUESTIONS 1-9: 0
SUM OF ALL RESPONSES TO PHQ QUESTIONS 1-9: 0
2. FEELING DOWN, DEPRESSED OR HOPELESS: 0
SUM OF ALL RESPONSES TO PHQ9 QUESTIONS 1 & 2: 0
SUM OF ALL RESPONSES TO PHQ QUESTIONS 1-9: 0

## 2023-06-30 ASSESSMENT — LIFESTYLE VARIABLES
HOW MANY STANDARD DRINKS CONTAINING ALCOHOL DO YOU HAVE ON A TYPICAL DAY: 1 OR 2
HOW OFTEN DO YOU HAVE A DRINK CONTAINING ALCOHOL: MONTHLY OR LESS

## 2023-07-03 RX ORDER — CYCLOBENZAPRINE HCL 5 MG
TABLET ORAL
Qty: 90 TABLET | Refills: 0 | Status: SHIPPED | OUTPATIENT
Start: 2023-07-03

## 2023-07-05 ENCOUNTER — NURSE ONLY (OUTPATIENT)
Age: 63
End: 2023-07-05

## 2023-07-05 DIAGNOSIS — Z00.00 ROUTINE ADULT HEALTH MAINTENANCE: ICD-10-CM

## 2023-07-05 DIAGNOSIS — Z79.4 TYPE 2 DIABETES MELLITUS WITH DIABETIC NEPHROPATHY, WITH LONG-TERM CURRENT USE OF INSULIN (HCC): ICD-10-CM

## 2023-07-05 DIAGNOSIS — E55.9 VITAMIN D DEFICIENCY, UNSPECIFIED: ICD-10-CM

## 2023-07-05 DIAGNOSIS — E11.21 TYPE 2 DIABETES MELLITUS WITH DIABETIC NEPHROPATHY, WITH LONG-TERM CURRENT USE OF INSULIN (HCC): ICD-10-CM

## 2023-07-06 LAB
25(OH)D3 SERPL-MCNC: 69.3 NG/ML (ref 30–100)
ALBUMIN SERPL-MCNC: 3.5 G/DL (ref 3.5–5)
ALBUMIN/GLOB SERPL: 1.1 (ref 1.1–2.2)
ALP SERPL-CCNC: 87 U/L (ref 45–117)
ALT SERPL-CCNC: 22 U/L (ref 12–78)
ANION GAP SERPL CALC-SCNC: 7 MMOL/L (ref 5–15)
APPEARANCE UR: CLEAR
AST SERPL-CCNC: 16 U/L (ref 15–37)
BACTERIA URNS QL MICRO: ABNORMAL /HPF
BASOPHILS # BLD: 0 K/UL (ref 0–0.1)
BASOPHILS NFR BLD: 1 % (ref 0–1)
BILIRUB SERPL-MCNC: 0.4 MG/DL (ref 0.2–1)
BILIRUB UR QL: NEGATIVE
BUN SERPL-MCNC: 20 MG/DL (ref 6–20)
BUN/CREAT SERPL: 20 (ref 12–20)
CALCIUM SERPL-MCNC: 9.2 MG/DL (ref 8.5–10.1)
CHLORIDE SERPL-SCNC: 108 MMOL/L (ref 97–108)
CHOLEST SERPL-MCNC: 156 MG/DL
CO2 SERPL-SCNC: 27 MMOL/L (ref 21–32)
COLOR UR: ABNORMAL
CREAT SERPL-MCNC: 1.01 MG/DL (ref 0.55–1.02)
CREAT UR-MCNC: 188 MG/DL
DIFFERENTIAL METHOD BLD: NORMAL
EOSINOPHIL # BLD: 0.2 K/UL (ref 0–0.4)
EOSINOPHIL NFR BLD: 3 % (ref 0–7)
EPITH CASTS URNS QL MICRO: ABNORMAL /LPF
ERYTHROCYTE [DISTWIDTH] IN BLOOD BY AUTOMATED COUNT: 12.6 % (ref 11.5–14.5)
EST. AVERAGE GLUCOSE BLD GHB EST-MCNC: 194 MG/DL
GLOBULIN SER CALC-MCNC: 3.1 G/DL (ref 2–4)
GLUCOSE SERPL-MCNC: 84 MG/DL (ref 65–100)
GLUCOSE UR STRIP.AUTO-MCNC: >1000 MG/DL
HBA1C MFR BLD: 8.4 % (ref 4–5.6)
HCT VFR BLD AUTO: 42.2 % (ref 35–47)
HDLC SERPL-MCNC: 55 MG/DL
HDLC SERPL: 2.8 (ref 0–5)
HGB BLD-MCNC: 13.1 G/DL (ref 11.5–16)
HGB UR QL STRIP: NEGATIVE
IMM GRANULOCYTES # BLD AUTO: 0 K/UL (ref 0–0.04)
IMM GRANULOCYTES NFR BLD AUTO: 0 % (ref 0–0.5)
KETONES UR QL STRIP.AUTO: NEGATIVE MG/DL
LDLC SERPL CALC-MCNC: 84 MG/DL (ref 0–100)
LEUKOCYTE ESTERASE UR QL STRIP.AUTO: NEGATIVE
LYMPHOCYTES # BLD: 1.9 K/UL (ref 0.8–3.5)
LYMPHOCYTES NFR BLD: 33 % (ref 12–49)
MCH RBC QN AUTO: 27.1 PG (ref 26–34)
MCHC RBC AUTO-ENTMCNC: 31 G/DL (ref 30–36.5)
MCV RBC AUTO: 87.4 FL (ref 80–99)
MICROALBUMIN UR-MCNC: 1.16 MG/DL
MICROALBUMIN/CREAT UR-RTO: 6 MG/G (ref 0–30)
MONOCYTES # BLD: 0.4 K/UL (ref 0–1)
MONOCYTES NFR BLD: 8 % (ref 5–13)
NEUTS SEG # BLD: 3.3 K/UL (ref 1.8–8)
NEUTS SEG NFR BLD: 55 % (ref 32–75)
NITRITE UR QL STRIP.AUTO: NEGATIVE
NRBC # BLD: 0 K/UL (ref 0–0.01)
NRBC BLD-RTO: 0 PER 100 WBC
PH UR STRIP: 5.5 (ref 5–8)
PLATELET # BLD AUTO: 322 K/UL (ref 150–400)
PMV BLD AUTO: 11.4 FL (ref 8.9–12.9)
POTASSIUM SERPL-SCNC: 4 MMOL/L (ref 3.5–5.1)
PROT SERPL-MCNC: 6.6 G/DL (ref 6.4–8.2)
PROT UR STRIP-MCNC: NEGATIVE MG/DL
RBC # BLD AUTO: 4.83 M/UL (ref 3.8–5.2)
RBC #/AREA URNS HPF: ABNORMAL /HPF (ref 0–5)
SODIUM SERPL-SCNC: 142 MMOL/L (ref 136–145)
SP GR UR REFRACTOMETRY: 1.02
TRIGL SERPL-MCNC: 85 MG/DL
URINE CULTURE IF INDICATED: ABNORMAL
UROBILINOGEN UR QL STRIP.AUTO: 0.2 EU/DL (ref 0.2–1)
VLDLC SERPL CALC-MCNC: 17 MG/DL
WBC # BLD AUTO: 5.9 K/UL (ref 3.6–11)
WBC URNS QL MICRO: ABNORMAL /HPF (ref 0–4)

## 2023-07-07 ENCOUNTER — TELEPHONE (OUTPATIENT)
Age: 63
End: 2023-07-07

## 2023-07-07 NOTE — TELEPHONE ENCOUNTER
----- Message from Alfredo Hurt MD sent at 7/6/2023  8:10 PM EDT -----  Fax results to [facility]. A1C remains above goal.  Normal kidney and liver function. Good cholesterol numbers. Blood cell counts are normal. Vit D level is at goal. Please follow up with Endocrinology for diabetes med adjustment.

## 2023-08-24 ENCOUNTER — HOSPITAL ENCOUNTER (EMERGENCY)
Facility: HOSPITAL | Age: 63
Discharge: HOME | End: 2023-08-24
Attending: EMERGENCY MEDICINE
Payer: COMMERCIAL

## 2023-08-24 ENCOUNTER — APPOINTMENT (OUTPATIENT)
Facility: HOSPITAL | Age: 63
End: 2023-08-24
Payer: COMMERCIAL

## 2023-08-24 VITALS
HEART RATE: 72 BPM | BODY MASS INDEX: 36.81 KG/M2 | OXYGEN SATURATION: 98 % | DIASTOLIC BLOOD PRESSURE: 75 MMHG | WEIGHT: 235 LBS | SYSTOLIC BLOOD PRESSURE: 153 MMHG | RESPIRATION RATE: 12 BRPM | TEMPERATURE: 98.3 F

## 2023-08-24 DIAGNOSIS — I10 ELEVATED BLOOD PRESSURE READING WITH DIAGNOSIS OF HYPERTENSION: Primary | ICD-10-CM

## 2023-08-24 DIAGNOSIS — R51.9 ACUTE NONINTRACTABLE HEADACHE, UNSPECIFIED HEADACHE TYPE: ICD-10-CM

## 2023-08-24 DIAGNOSIS — H93.12 LEFT-SIDED TINNITUS: ICD-10-CM

## 2023-08-24 DIAGNOSIS — E78.00 PURE HYPERCHOLESTEROLEMIA, UNSPECIFIED: ICD-10-CM

## 2023-08-24 LAB
APPEARANCE UR: ABNORMAL
BACTERIA URNS QL MICRO: ABNORMAL /HPF
BILIRUB UR QL: NEGATIVE
COLOR UR: ABNORMAL
EPITH CASTS URNS QL MICRO: ABNORMAL /LPF
GLUCOSE UR STRIP.AUTO-MCNC: >1000 MG/DL
HGB UR QL STRIP: NEGATIVE
KETONES UR QL STRIP.AUTO: NEGATIVE MG/DL
LEUKOCYTE ESTERASE UR QL STRIP.AUTO: NEGATIVE
NITRITE UR QL STRIP.AUTO: NEGATIVE
PH UR STRIP: 6.5 (ref 5–8)
PROT UR STRIP-MCNC: NEGATIVE MG/DL
RBC #/AREA URNS HPF: ABNORMAL /HPF (ref 0–5)
SARS-COV-2 RDRP RESP QL NAA+PROBE: NOT DETECTED
SOURCE: NORMAL
SP GR UR REFRACTOMETRY: 1.01 (ref 1–1.03)
UROBILINOGEN UR QL STRIP.AUTO: 1 EU/DL (ref 0.2–1)
WBC URNS QL MICRO: ABNORMAL /HPF (ref 0–4)
YEAST BUDDING URNS QL: PRESENT

## 2023-08-24 PROCEDURE — 81001 URINALYSIS AUTO W/SCOPE: CPT

## 2023-08-24 PROCEDURE — 87635 SARS-COV-2 COVID-19 AMP PRB: CPT

## 2023-08-24 PROCEDURE — 93005 ELECTROCARDIOGRAM TRACING: CPT | Performed by: EMERGENCY MEDICINE

## 2023-08-24 PROCEDURE — 99284 EMERGENCY DEPT VISIT MOD MDM: CPT

## 2023-08-24 PROCEDURE — 70450 CT HEAD/BRAIN W/O DYE: CPT

## 2023-08-24 RX ORDER — ACETAMINOPHEN 500 MG
1000 TABLET ORAL
Status: DISCONTINUED | OUTPATIENT
Start: 2023-08-24 | End: 2023-08-24 | Stop reason: HOSPADM

## 2023-08-24 RX ORDER — PRAVASTATIN SODIUM 40 MG
TABLET ORAL
Qty: 90 TABLET | Refills: 3 | Status: SHIPPED | OUTPATIENT
Start: 2023-08-24

## 2023-08-24 NOTE — ED TRIAGE NOTES
Pt c/o hearing her heartbeat in her ears x 3 days; lower back pain x years, increased fatigue, cough; denied fever, n/v/d and injury.  7/10

## 2023-08-25 LAB
EKG ATRIAL RATE: 67 BPM
EKG DIAGNOSIS: NORMAL
EKG P AXIS: 52 DEGREES
EKG P-R INTERVAL: 178 MS
EKG Q-T INTERVAL: 446 MS
EKG QRS DURATION: 90 MS
EKG QTC CALCULATION (BAZETT): 471 MS
EKG R AXIS: 62 DEGREES
EKG T AXIS: 65 DEGREES
EKG VENTRICULAR RATE: 67 BPM

## 2023-08-25 NOTE — DISCHARGE INSTRUCTIONS
The CT scan of your head did not show any acute abnormalities. Please take Tylenol as needed for headaches. Also track your blood pressures over the next couple of days so you can discuss with your PCP next week. If your symptoms of pounding in your ear does not improve, discussed with the PCP, and make an appointment to follow-up with ENT. Thank you.

## 2023-08-25 NOTE — ED PROVIDER NOTES
[facility] EMERGENCY DEPT  EMERGENCY DEPARTMENT ENCOUNTER      Pt Name: Shari Castillo  MRN: [id number]  [address] [facility] 1960  Date of evaluation: 8/24/2023  Provider: Coretta Christopher MD    [address]       Chief Complaint   Patient presents with    Back Pain    Fatigue    Cough         HISTORY OF PRESENT ILLNESS   (Location/Symptom, Timing/Onset, Context/Setting, Quality, Duration, Modifying Factors, Severity)  Note limiting factors. Patient is a 68-year-old female with history of arthritis, GERD, heart murmur, sleep apnea who presents with pounding in her left ear. Patient reports that it is intermittent, she checked her blood pressure this morning and it was elevated. She says her cardiologist likes it to be under systolic 858T but it was 827 at home. Patient says that the throbbing in her ear has been intermittent, there is no pain, there is no discharge, there is no decrease in hearing. Patient reports that she has intermittent headaches, and has a had a headache over the last 3 days which she never does before she says it is towards the back of her head, along her left neck. Patient says that she has degenerative disc disease, and does sleep awkwardly and wonders if that is causing the pain. Patient says that she leaves on vacation tomorrow, and wanted to make sure it was not her heart. Patient denies any chest pain, palpitations, shortness of breath, lightheadedness. The history is provided by the patient. Nursing Notes were reviewed.     REVIEW OF SYSTEMS    Not Required     Review of Systems    PAST MEDICAL HISTORY     Past Medical History:   Diagnosis Date    Arthritis     GERD (gastroesophageal reflux disease)     Heart murmur 11/8/2010    Sleep apnea 11/8/2010       SURGICAL HISTORY       Past Surgical History:   Procedure Laterality Date    HEENT      uvuloplasty    HYSTERECTOMY (CERVIX STATUS UNKNOWN)      ORTHOPEDIC SURGERY Left     rotator cuff       CURRENT MEDICATIONS

## 2023-08-25 NOTE — ED NOTES
The patient was discharged home in stable condition. The patient is alert and oriented, in no respiratory distress. The patient's diagnosis, condition and treatment were explained. The patient expressed understanding. No prescriptions given/e-scribed to pharmacy. No work/school note given. A discharge plan has been developed. A  was not involved in the process. Aftercare instructions were given. Pt ambulatory out of the ED.        Nikko Garcai, RN  61/90/25 2212

## 2023-09-17 RX ORDER — GLIPIZIDE 10 MG/1
10 TABLET ORAL 2 TIMES DAILY
Qty: 60 TABLET | Refills: 5 | Status: SHIPPED | OUTPATIENT
Start: 2023-09-17

## 2023-09-17 RX ORDER — ERGOCALCIFEROL 1.25 MG/1
50000 CAPSULE ORAL WEEKLY
Qty: 5 CAPSULE | Refills: 5 | Status: SHIPPED | OUTPATIENT
Start: 2023-09-17

## 2023-09-22 ENCOUNTER — HOSPITAL ENCOUNTER (OUTPATIENT)
Facility: HOSPITAL | Age: 63
Discharge: HOME | End: 2023-09-22
Attending: FAMILY MEDICINE
Payer: COMMERCIAL

## 2023-09-22 DIAGNOSIS — Z12.31 ENCOUNTER FOR SCREENING MAMMOGRAM FOR MALIGNANT NEOPLASM OF BREAST: ICD-10-CM

## 2023-09-22 PROCEDURE — 77067 SCR MAMMO BI INCL CAD: CPT

## 2023-09-25 ENCOUNTER — TELEPHONE (OUTPATIENT)
Age: 63
End: 2023-09-25

## 2023-12-22 ENCOUNTER — TELEPHONE (OUTPATIENT)
Age: 63
End: 2023-12-22

## 2023-12-22 NOTE — TELEPHONE ENCOUNTER
Pt needs ed fup. Next avail is 1/17/24. Is okay to wait this long or can you see her on 01/10/24 at 9:30 am? I offered pt to see another doctor, she decline.

## 2024-01-10 ENCOUNTER — NURSE ONLY (OUTPATIENT)
Age: 64
End: 2024-01-10

## 2024-01-10 ENCOUNTER — OFFICE VISIT (OUTPATIENT)
Age: 64
End: 2024-01-10
Payer: COMMERCIAL

## 2024-01-10 VITALS
OXYGEN SATURATION: 94 % | RESPIRATION RATE: 16 BRPM | BODY MASS INDEX: 36.57 KG/M2 | DIASTOLIC BLOOD PRESSURE: 74 MMHG | WEIGHT: 233 LBS | HEIGHT: 67 IN | HEART RATE: 63 BPM | TEMPERATURE: 97.5 F | SYSTOLIC BLOOD PRESSURE: 140 MMHG

## 2024-01-10 DIAGNOSIS — M25.552 PAIN OF LEFT HIP: ICD-10-CM

## 2024-01-10 DIAGNOSIS — I10 PRIMARY HYPERTENSION: ICD-10-CM

## 2024-01-10 DIAGNOSIS — E11.42 DM TYPE 2 WITH DIABETIC PERIPHERAL NEUROPATHY (HCC): ICD-10-CM

## 2024-01-10 DIAGNOSIS — Z79.4 LONG TERM (CURRENT) USE OF INSULIN (HCC): ICD-10-CM

## 2024-01-10 DIAGNOSIS — Z29.11 NEED FOR RSV IMMUNIZATION: ICD-10-CM

## 2024-01-10 DIAGNOSIS — Z23 NEED FOR TDAP VACCINATION: ICD-10-CM

## 2024-01-10 DIAGNOSIS — I11.9 HYPERTENSIVE LEFT VENTRICULAR HYPERTROPHY, WITHOUT HEART FAILURE: ICD-10-CM

## 2024-01-10 DIAGNOSIS — Z23 NEED FOR SHINGLES VACCINE: ICD-10-CM

## 2024-01-10 DIAGNOSIS — M54.50 LOW BACK PAIN, UNSPECIFIED BACK PAIN LATERALITY, UNSPECIFIED CHRONICITY, UNSPECIFIED WHETHER SCIATICA PRESENT: ICD-10-CM

## 2024-01-10 DIAGNOSIS — E04.9 THYROID GOITER: ICD-10-CM

## 2024-01-10 DIAGNOSIS — Z28.21 IMMUNIZATION DECLINED: ICD-10-CM

## 2024-01-10 DIAGNOSIS — E78.00 PURE HYPERCHOLESTEROLEMIA, UNSPECIFIED: ICD-10-CM

## 2024-01-10 DIAGNOSIS — R07.9 CHEST PAIN, UNSPECIFIED TYPE: Primary | ICD-10-CM

## 2024-01-10 DIAGNOSIS — Z23 NEED FOR PNEUMOCOCCAL 20-VALENT CONJUGATE VACCINATION: ICD-10-CM

## 2024-01-10 LAB
CHOLEST SERPL-MCNC: 170 MG/DL
HDLC SERPL-MCNC: 54 MG/DL
HDLC SERPL: 3.1 (ref 0–5)
LDLC SERPL CALC-MCNC: 89 MG/DL (ref 0–100)
T4 FREE SERPL-MCNC: 1 NG/DL (ref 0.8–1.5)
TRIGL SERPL-MCNC: 135 MG/DL
TSH SERPL DL<=0.05 MIU/L-ACNC: 1.24 UIU/ML (ref 0.36–3.74)
VLDLC SERPL CALC-MCNC: 27 MG/DL

## 2024-01-10 PROCEDURE — 3078F DIAST BP <80 MM HG: CPT | Performed by: FAMILY MEDICINE

## 2024-01-10 PROCEDURE — 3077F SYST BP >= 140 MM HG: CPT | Performed by: FAMILY MEDICINE

## 2024-01-10 PROCEDURE — 90471 IMMUNIZATION ADMIN: CPT | Performed by: FAMILY MEDICINE

## 2024-01-10 PROCEDURE — 90715 TDAP VACCINE 7 YRS/> IM: CPT | Performed by: FAMILY MEDICINE

## 2024-01-10 PROCEDURE — 1123F ACP DISCUSS/DSCN MKR DOCD: CPT | Performed by: FAMILY MEDICINE

## 2024-01-10 PROCEDURE — 99214 OFFICE O/P EST MOD 30 MIN: CPT | Performed by: FAMILY MEDICINE

## 2024-01-10 RX ORDER — LOSARTAN POTASSIUM 100 MG/1
100 TABLET ORAL DAILY
Qty: 90 TABLET | Refills: 1 | Status: SHIPPED | OUTPATIENT
Start: 2024-01-10

## 2024-01-10 RX ORDER — RESPIRATORY SYNCYTIAL VIRUS VACCINE 120MCG/0.5
0.5 KIT INTRAMUSCULAR ONCE
Qty: 0.5 ML | Refills: 0 | Status: SHIPPED | OUTPATIENT
Start: 2024-01-10 | End: 2024-01-10

## 2024-01-10 RX ORDER — ZOSTER VACCINE RECOMBINANT, ADJUVANTED 50 MCG/0.5
0.5 KIT INTRAMUSCULAR ONCE
Qty: 1 EACH | Refills: 1 | Status: SHIPPED | OUTPATIENT
Start: 2024-01-10 | End: 2024-01-10

## 2024-01-10 RX ORDER — CYCLOBENZAPRINE HCL 5 MG
5 TABLET ORAL 3 TIMES DAILY PRN
Qty: 90 TABLET | Refills: 0 | Status: SHIPPED | OUTPATIENT
Start: 2024-01-10

## 2024-01-10 ASSESSMENT — PATIENT HEALTH QUESTIONNAIRE - PHQ9
SUM OF ALL RESPONSES TO PHQ QUESTIONS 1-9: 0
1. LITTLE INTEREST OR PLEASURE IN DOING THINGS: 0
SUM OF ALL RESPONSES TO PHQ9 QUESTIONS 1 & 2: 0
2. FEELING DOWN, DEPRESSED OR HOPELESS: 0
SUM OF ALL RESPONSES TO PHQ QUESTIONS 1-9: 0

## 2024-01-10 ASSESSMENT — ENCOUNTER SYMPTOMS: CHEST TIGHTNESS: 1

## 2024-01-10 NOTE — PROGRESS NOTES
Nikky Hayes (:  1960) is a 63 y.o. female,Established patient, here for evaluation of the following chief complaint(s):  ED follow up (2023 for chest discomfort)        ASSESSMENT/PLAN:  1. Chest pain, unspecified type  -     AFL - Keshawn Monzon MD, Gastroenterology, [place] ([facility])  2. DM type 2 with diabetic peripheral neuropathy (HCC)  -     External Referral To Podiatry  -     Lipid Panel; Future  -     Hemoglobin A1C; Future  3. Pure hypercholesterolemia, unspecified  4. Long term (current) use of insulin (HCC)  5. Immunization declined  6. Thyroid goiter  -     T4, Free; Future  -     TSH; Future  7. Low back pain, unspecified back pain laterality, unspecified chronicity, unspecified whether sciatica present  -     cyclobenzaprine (FLEXERIL) 5 MG tablet; Take 1 tablet by mouth 3 times daily as needed for Muscle spasms, Disp-90 tablet, R-0Normal  8. Need for RSV immunization  -     respiratory syncytial vaccine (ABRYSVO) 120 MCG/0.5ML SOLR injection; Inject 0.5 mLs into the muscle once for 1 dose, Disp-0.5 mL, R-0Print  9. Need for pneumococcal 20-valent conjugate vaccination  -     pneumococcal 20-valent conjugat (PREVNAR) 0.5 ML PRABHJOT inj; Inject 0.5 mLs into the muscle once for 1 dose, Disp-0.5 mL, R-0Print  10. Need for shingles vaccine  11. Need for Tdap vaccination  -     Tdap, BOOSTRIX, (age 10 yrs+), IM  12. Primary hypertension  -     losartan (COZAAR) 100 MG tablet; Take 1 tablet by mouth daily, Disp-90 tablet, R-1Note change in dose of medication.Normal  13. Pain of left hip  -     XR HIP 2-3 VW W PELVIS LEFT; Future  14. Hypertensive left ventricular hypertrophy, without heart failure    Patient has an appointment with cardiology Dr. SYDNIE Chandra on 24.  Increased dose losartan 100 mg daily for BP control.  Pt declined FLU vaccine. She would like Tdap booster.   ENDO switching to Ozempic in place of Rybelsus. She has not started it. Fax labs to ENDO office.  Order

## 2024-01-10 NOTE — PROGRESS NOTES
Identified pt with two pt identifiers(name and ).    Chief Complaint   Patient presents with    ED follow up     2023 for chest discomfort        Health Maintenance Due   Topic    HIV screen     Shingles vaccine (1 of 2)    DEXA (modify frequency per FRAX score)     Respiratory Syncytial Virus (RSV) Pregnant or age 60 yrs+ (1 - 1-dose 60+ series)    DTaP/Tdap/Td vaccine (2 - Td or Tdap)    Diabetic foot exam     Pneumococcal 65+ years Vaccine (3 - PPSV23 or PCV20)    Flu vaccine (1)    COVID-19 Vaccine (3 -  season)    Diabetic retinal exam        Wt Readings from Last 3 Encounters:   01/10/24 105.7 kg (233 lb)   23 106.6 kg (235 lb)   23 106.6 kg (235 lb)     Temp Readings from Last 3 Encounters:   01/10/24 97.5 °F (36.4 °C) (Temporal)   23 98.1 °F (36.7 °C) (Oral)   23 98.3 °F (36.8 °C) (Oral)     BP Readings from Last 3 Encounters:   01/10/24 (!) 150/68   23 (!) 131/59   23 (!) 153/75     Pulse Readings from Last 3 Encounters:   01/10/24 63   23 57   23 72           Depression Screening:  :         1/10/2024     9:48 AM 2023     4:13 PM 2022     2:20 PM   PHQ-9 Questionaire   Little interest or pleasure in doing things 0 0 0   Feeling down, depressed, or hopeless 0 0 0   PHQ-9 Total Score 0 0 0        Fall Risk Assessment:  :         2023     4:12 PM   Fall Risk   2 or more falls in past year? no   Fall with injury in past year? no        Abuse Screening:  :          No data to display                 Coordination of Care Questionnaire:  :     \"Have you been to the ER, urgent care clinic since your last visit?  Hospitalized since your last visit?\"    NO    [facility] on 2023 for chest discomfort     “Have you seen or consulted any other health care providers outside of [facility] since your last visit?”    NO

## 2024-01-11 LAB
EST. AVERAGE GLUCOSE BLD GHB EST-MCNC: 157 MG/DL
HBA1C MFR BLD: 7.1 % (ref 4–5.6)

## 2024-01-12 ENCOUNTER — TELEPHONE (OUTPATIENT)
Age: 64
End: 2024-01-12

## 2024-01-12 NOTE — TELEPHONE ENCOUNTER
----- Message from Sherita Bowden MD sent at 1/11/2024 10:32 PM EST -----  Great improvement in A1c level indicates good sugar control.  Normal thyroid level.  Good cholesterol numbers.  Please fax these lab results along with labs from ER visit to endocrinologist Dr.A. Mccarthy.

## 2024-01-26 ENCOUNTER — HOSPITAL ENCOUNTER (OUTPATIENT)
Facility: HOSPITAL | Age: 64
Discharge: HOME | End: 2024-01-26
Attending: FAMILY MEDICINE
Payer: COMMERCIAL

## 2024-01-26 DIAGNOSIS — M25.552 PAIN OF LEFT HIP: ICD-10-CM

## 2024-01-26 PROCEDURE — 73502 X-RAY EXAM HIP UNI 2-3 VIEWS: CPT

## 2024-01-31 ENCOUNTER — TELEPHONE (OUTPATIENT)
Age: 64
End: 2024-01-31

## 2024-01-31 NOTE — TELEPHONE ENCOUNTER
I called patient and relayed her xray results. She understood and said that she was not interested in a referral for back specialist that she would deal with pain at this time. I let her know that if she changed her mind to let us know.

## 2024-01-31 NOTE — TELEPHONE ENCOUNTER
----- Message from Sherita Bowden MD sent at 1/30/2024  2:46 PM EST -----  XR of hips shows mild arthritis. I suspect her hip pain is coming more from degenerative discs of low back. Does she want a referral to back specialist?

## 2024-04-12 RX ORDER — ERGOCALCIFEROL 1.25 MG/1
50000 CAPSULE ORAL WEEKLY
Qty: 5 CAPSULE | Refills: 5 | Status: SHIPPED | OUTPATIENT
Start: 2024-04-12

## 2024-05-03 ENCOUNTER — OFFICE VISIT (OUTPATIENT)
Age: 64
End: 2024-05-03
Payer: COMMERCIAL

## 2024-05-03 VITALS
WEIGHT: 233 LBS | SYSTOLIC BLOOD PRESSURE: 136 MMHG | HEIGHT: 67 IN | HEART RATE: 62 BPM | OXYGEN SATURATION: 96 % | TEMPERATURE: 98.6 F | RESPIRATION RATE: 16 BRPM | DIASTOLIC BLOOD PRESSURE: 80 MMHG | BODY MASS INDEX: 36.57 KG/M2

## 2024-05-03 DIAGNOSIS — E66.01 SEVERE OBESITY (BMI 35.0-39.9) WITH COMORBIDITY (HCC): ICD-10-CM

## 2024-05-03 DIAGNOSIS — N18.30 STAGE 3 CHRONIC KIDNEY DISEASE, UNSPECIFIED WHETHER STAGE 3A OR 3B CKD (HCC): ICD-10-CM

## 2024-05-03 DIAGNOSIS — N76.1 CHRONIC VAGINITIS: Primary | ICD-10-CM

## 2024-05-03 DIAGNOSIS — E11.21 TYPE 2 DIABETES WITH NEPHROPATHY (HCC): ICD-10-CM

## 2024-05-03 PROCEDURE — 1123F ACP DISCUSS/DSCN MKR DOCD: CPT | Performed by: FAMILY MEDICINE

## 2024-05-03 PROCEDURE — 3051F HG A1C>EQUAL 7.0%<8.0%: CPT | Performed by: FAMILY MEDICINE

## 2024-05-03 PROCEDURE — 99213 OFFICE O/P EST LOW 20 MIN: CPT | Performed by: FAMILY MEDICINE

## 2024-05-03 RX ORDER — AMLODIPINE BESYLATE 2.5 MG/1
2.5 TABLET ORAL DAILY
COMMUNITY
Start: 2024-03-12

## 2024-05-03 RX ORDER — FLUCONAZOLE 150 MG/1
150 TABLET ORAL DAILY
Qty: 3 TABLET | Refills: 0 | Status: SHIPPED | OUTPATIENT
Start: 2024-05-03 | End: 2024-05-06

## 2024-05-03 RX ORDER — METOPROLOL SUCCINATE 25 MG/1
25 TABLET, EXTENDED RELEASE ORAL DAILY
COMMUNITY
Start: 2024-03-12

## 2024-05-03 NOTE — PROGRESS NOTES
Nikky Hayes (:  1960) is a 64 y.o. female,Established patient, here for evaluation of the following chief complaint(s):  Vaginal Itching (Started 1 month ago)        ASSESSMENT/PLAN:  1. Chronic vaginitis  -     NuSwab Vaginitis Plus (VG+) with Candida (Six Species); Future  -     fluconazole (DIFLUCAN) 150 MG tablet; Take 1 tablet by mouth daily for 3 days, Disp-3 tablet, R-0Normal  2. Severe obesity (BMI 35.0-39.9) with comorbidity (HCC)  3. Stage 3 chronic kidney disease, unspecified whether stage 3a or 3b CKD ([facility])  4. Type 2 diabetes with nephropathy ([facility])    Send out new swab.  Treat empirically with terconazole 150 mg tab daily x 3.  Advised patient that the Jardiance may be increasing her risk for vaginal yeast infections.  Schedule appointment for physical with labs.    No follow-ups on file.      SUBJECTIVE/OBJECTIVE:  HPI  Complaining of vaginal itching and burning for over a month now.  Denies vaginal discharge.  She tried using over-the-counter yeast treatment cream without relief.  Denies any new exposures to new soaps or detergents.  Skin feels very irritated.  She is diabetic started on Jardiance a few months ago.  Last A1c in January was 7.1.  Fasting blood sugars run around 130.  She was prescribed Ozempic but she never started taking it.  Current Outpatient Medications   Medication Sig Dispense Refill    amLODIPine (NORVASC) 2.5 MG tablet Take 1 tablet by mouth daily      metoprolol succinate (TOPROL XL) 25 MG extended release tablet Take 1 tablet by mouth daily      fluconazole (DIFLUCAN) 150 MG tablet Take 1 tablet by mouth daily for 3 days 3 tablet 0    vitamin D (ERGOCALCIFEROL) 1.25 MG (18698 UT) CAPS capsule Take 1 capsule by mouth once a week 5 capsule 5    cyclobenzaprine (FLEXERIL) 5 MG tablet Take 1 tablet by mouth 3 times daily as needed for Muscle spasms 90 tablet 0    losartan (COZAAR) 100 MG tablet Take 1 tablet by mouth daily 90 tablet 1    glipiZIDE (GLUCOTROL) 10 MG 
Identified pt with two pt identifiers(name and ).    Chief Complaint   Patient presents with    Vaginal Itching     Started 1 month ago        Health Maintenance Due   Topic    Shingles vaccine (1 of 2)    DEXA (modify frequency per FRAX score)     Respiratory Syncytial Virus (RSV) Pregnant or age 60 yrs+ (1 - 1-dose 60+ series)    Diabetic foot exam     Pneumococcal 65+ years Vaccine (3 of 3 - PPSV23 or PCV20)    COVID-19 Vaccine (3 - - season)    Diabetic retinal exam        Wt Readings from Last 3 Encounters:   24 105.7 kg (233 lb)   01/10/24 105.7 kg (233 lb)   23 106.6 kg (235 lb)     Temp Readings from Last 3 Encounters:   24 98.6 °F (37 °C) (Temporal)   01/10/24 97.5 °F (36.4 °C) (Temporal)   23 98.1 °F (36.7 °C) (Oral)     BP Readings from Last 3 Encounters:   24 (!) 148/70   01/10/24 (!) 140/74   23 (!) 131/59     Pulse Readings from Last 3 Encounters:   24 62   01/10/24 63   23 57           Depression Screening:  :         1/10/2024     9:48 AM 2023     4:13 PM 2022     2:20 PM   PHQ-9 Questionaire   Little interest or pleasure in doing things 0 0 0   Feeling down, depressed, or hopeless 0 0 0   PHQ-9 Total Score 0 0 0        Fall Risk Assessment:  :         2023     4:12 PM   Fall Risk   2 or more falls in past year? no   Fall with injury in past year? no        Abuse Screening:  :          No data to display                 Coordination of Care Questionnaire:  :     \"Have you been to the ER, urgent care clinic since your last visit?  Hospitalized since your last visit?\"    NO    “Have you seen or consulted any other health care providers outside of [facility] since your last visit?”    NO  Cardiology Dr. Sesar Martínez           
ambulatory

## 2024-05-07 ENCOUNTER — TELEPHONE (OUTPATIENT)
Age: 64
End: 2024-05-07

## 2024-05-07 NOTE — TELEPHONE ENCOUNTER
----- Message from Sherita Bowden MD sent at 5/6/2024  9:37 PM EDT -----  Advise patient that vaginal swab positive for yeast infection.

## 2024-05-09 LAB
A VAGINAE DNA VAG QL NAA+PROBE: ABNORMAL SCORE
BVAB2 DNA VAG QL NAA+PROBE: ABNORMAL SCORE
C ALBICANS DNA VAG QL NAA+PROBE: NEGATIVE
C GLABRATA DNA VAG QL NAA+PROBE: POSITIVE
C TRACH RRNA SPEC QL NAA+PROBE: NEGATIVE
CANDIDA KRUSEI: NEGATIVE
CANDIDA LUSITANIAE, NAA: NEGATIVE
CANDIDA PARAPSILOSIS/TROPICALIS: NEGATIVE
MEGA1 DNA VAG QL NAA+PROBE: ABNORMAL SCORE
N GONORRHOEA RRNA SPEC QL NAA+PROBE: NEGATIVE
T VAGINALIS RRNA SPEC QL NAA+PROBE: NEGATIVE

## 2024-06-03 ENCOUNTER — HOSPITAL ENCOUNTER (OUTPATIENT)
Facility: HOSPITAL | Age: 64
Setting detail: OUTPATIENT SURGERY
Discharge: HOME | End: 2024-06-03
Attending: INTERNAL MEDICINE | Admitting: INTERNAL MEDICINE
Payer: COMMERCIAL

## 2024-06-03 ENCOUNTER — ANESTHESIA (OUTPATIENT)
Facility: HOSPITAL | Age: 64
End: 2024-06-03
Payer: COMMERCIAL

## 2024-06-03 ENCOUNTER — ANESTHESIA EVENT (OUTPATIENT)
Facility: HOSPITAL | Age: 64
End: 2024-06-03
Payer: COMMERCIAL

## 2024-06-03 VITALS
TEMPERATURE: 97.9 F | RESPIRATION RATE: 20 BRPM | OXYGEN SATURATION: 97 % | HEIGHT: 67 IN | DIASTOLIC BLOOD PRESSURE: 55 MMHG | HEART RATE: 63 BPM | WEIGHT: 231 LBS | BODY MASS INDEX: 36.26 KG/M2 | SYSTOLIC BLOOD PRESSURE: 110 MMHG

## 2024-06-03 LAB
GLUCOSE BLD STRIP.AUTO-MCNC: 94 MG/DL (ref 65–117)
SERVICE CMNT-IMP: NORMAL

## 2024-06-03 PROCEDURE — 82962 GLUCOSE BLOOD TEST: CPT

## 2024-06-03 PROCEDURE — 6360000002 HC RX W HCPCS: Performed by: NURSE ANESTHETIST, CERTIFIED REGISTERED

## 2024-06-03 PROCEDURE — 6370000000 HC RX 637 (ALT 250 FOR IP): Performed by: INTERNAL MEDICINE

## 2024-06-03 PROCEDURE — 7100000011 HC PHASE II RECOVERY - ADDTL 15 MIN: Performed by: INTERNAL MEDICINE

## 2024-06-03 PROCEDURE — 88305 TISSUE EXAM BY PATHOLOGIST: CPT

## 2024-06-03 PROCEDURE — 3700000001 HC ADD 15 MINUTES (ANESTHESIA): Performed by: INTERNAL MEDICINE

## 2024-06-03 PROCEDURE — 3600007502: Performed by: INTERNAL MEDICINE

## 2024-06-03 PROCEDURE — 3600007512: Performed by: INTERNAL MEDICINE

## 2024-06-03 PROCEDURE — 7100000010 HC PHASE II RECOVERY - FIRST 15 MIN: Performed by: INTERNAL MEDICINE

## 2024-06-03 PROCEDURE — 2580000003 HC RX 258: Performed by: INTERNAL MEDICINE

## 2024-06-03 PROCEDURE — 2500000003 HC RX 250 WO HCPCS: Performed by: NURSE ANESTHETIST, CERTIFIED REGISTERED

## 2024-06-03 PROCEDURE — 2709999900 HC NON-CHARGEABLE SUPPLY: Performed by: INTERNAL MEDICINE

## 2024-06-03 PROCEDURE — 3700000000 HC ANESTHESIA ATTENDED CARE: Performed by: INTERNAL MEDICINE

## 2024-06-03 RX ORDER — SODIUM CHLORIDE 9 MG/ML
INJECTION, SOLUTION INTRAVENOUS CONTINUOUS
Status: DISCONTINUED | OUTPATIENT
Start: 2024-06-03 | End: 2024-06-03 | Stop reason: HOSPADM

## 2024-06-03 RX ORDER — LIDOCAINE HYDROCHLORIDE 20 MG/ML
INJECTION, SOLUTION EPIDURAL; INFILTRATION; INTRACAUDAL; PERINEURAL PRN
Status: DISCONTINUED | OUTPATIENT
Start: 2024-06-03 | End: 2024-06-03 | Stop reason: SDUPTHER

## 2024-06-03 RX ORDER — SODIUM CHLORIDE 0.9 % (FLUSH) 0.9 %
5-40 SYRINGE (ML) INJECTION PRN
Status: DISCONTINUED | OUTPATIENT
Start: 2024-06-03 | End: 2024-06-03 | Stop reason: HOSPADM

## 2024-06-03 RX ORDER — SIMETHICONE 40MG/0.6ML
SUSPENSION, DROPS(FINAL DOSAGE FORM)(ML) ORAL PRN
Status: DISCONTINUED | OUTPATIENT
Start: 2024-06-03 | End: 2024-06-03 | Stop reason: ALTCHOICE

## 2024-06-03 RX ADMIN — LIDOCAINE HYDROCHLORIDE 100 MG: 20 INJECTION, SOLUTION EPIDURAL; INFILTRATION; INTRACAUDAL; PERINEURAL at 14:59

## 2024-06-03 RX ADMIN — SODIUM CHLORIDE: 9 INJECTION, SOLUTION INTRAVENOUS at 14:29

## 2024-06-03 RX ADMIN — PROPOFOL 100 MG: 10 INJECTION, EMULSION INTRAVENOUS at 14:59

## 2024-06-03 RX ADMIN — LIDOCAINE HYDROCHLORIDE 50 MG: 20 INJECTION, SOLUTION EPIDURAL; INFILTRATION; INTRACAUDAL; PERINEURAL at 15:02

## 2024-06-03 RX ADMIN — LIDOCAINE HYDROCHLORIDE 50 MG: 20 INJECTION, SOLUTION EPIDURAL; INFILTRATION; INTRACAUDAL; PERINEURAL at 15:10

## 2024-06-03 RX ADMIN — LIDOCAINE HYDROCHLORIDE 50 MG: 20 INJECTION, SOLUTION EPIDURAL; INFILTRATION; INTRACAUDAL; PERINEURAL at 15:06

## 2024-06-03 ASSESSMENT — PAIN - FUNCTIONAL ASSESSMENT: PAIN_FUNCTIONAL_ASSESSMENT: NONE - DENIES PAIN

## 2024-06-03 NOTE — PROGRESS NOTES
The risks and benefits of the bite block have been explained to patient.  Patient verbalizes understanding.    ARRIVAL INFORMATION:  Verified patient name and date of birth, scheduled procedure, and informed consent.     : Omid friend contact number: [phone]  Physician and staff cannot share information with the .     Belongings with patient include:  Clothing,Jewelry    GI FOCUSED ASSESSMENT:  Neuro: Awake, alert, oriented x4  Respiratory: even and unlabored   GI: soft and non-distended  EKG Rhythm: sinus bradycardia and PVC's    Education:Reviewed general discharge instructions and  information.

## 2024-06-03 NOTE — DISCHARGE INSTRUCTIONS
[facility]  [facility]  Amarjit Steiner MD, FACG  [phone]     Aimee 3, 2024     Nikky Hayes  YOB: 1960    ENDOSCOPY DISCHARGE INSTRUCTIONS    If there is redness at IV site you should apply warm compress to area.  If redness or soreness persist contact Dr. Steiner' or your primary care doctor.    Gaseous discomfort may develop, but walking, belching will help relieve this.  You may not operate a vehicle for 12 hours  You may not operate machinery or dangerous appliances for rest of today  You may not drink alcoholic beverages for 12 hours  Avoid making any critical decisions for 24 hours    DIET:  You may resume your normal diet, but some patients find that heavy or large meals may lead to indigestion or vomiting.  I suggest a light meal as first food intake.    MEDICATIONS:  The use of some over-the-counter pain medication may lead to bleeding after biopsies or other procedures you may have had done.  Tylenol (also called acetaminophen) is safe to take and will not lead to bleeding.      ACTIVITY:  You may resume your normal household activities, but it is recommended that you spend the remainder of the day resting -  avoid any strenuous activity.    CALL DR. Steiner' OFFICE IF:  Increasing pain, nausea, vomiting  Abdominal distension (swelling)  Significant new or increased bleeding (oral or rectal)  Fever/Chills  Chest pain/shortness of breath                   Additional instructions:   Impression:   - 2 cm hiatal hernia  - Gastric polyps, biopsies taken           Recommendations:  - Await pathology results  - Attempt to decrease omeprazole to 20 mg QD if tolerated  - Resume diet as tolerated     It was an honor to be your doctor today.  Please let me or my office staff know if you have any feedback about today's procedure.      Amarjit Steiner MD, FACG    Patient Education on Sedation / Analgesia Administered for

## 2024-06-03 NOTE — OP NOTE
[facility]  [facility]  Amarjit Steiner MD, [facility]  [phone]       Aimee 3, 2024    Esophagogastroduodenoscopy (EGD) Procedure Note  Nikky Hayes  : 1960  [facility] Medical Record Number: [id number]      Indications:  Heartburn, on PPI for long term, recent non cardiac chest pain  Referring Physician:  Sherita Bowden MD  Anesthesia/Sedation:  Conscious sedation/deep sedation/monitored anesthesia -- see notes.  Endoscopist:  Dr. Amrajit Steiner  Complications:  None  Estimated Blood Loss:  None    Permit:  The indications, risks, benefits and alternatives were reviewed with the patient or their decision maker who was provided an opportunity to ask questions and all questions were answered.  The specific risks of esophagogastroduodenoscopy with conscious sedation were reviewed, including but not limited to anesthetic complication, bleeding, adverse drug reaction, missed lesion, infection, IV site reactions, and intestinal perforation which would lead to the need for surgical repair.  Alternatives to EGD including radiographic imaging, observation without testing, or laboratory testing were reviewed as well as the limitations of those alternatives discussed.  After considering the options and having all their questions answered, the patient or their decision maker provided both verbal and written consent to proceed.       Procedure in Detail:  After obtaining informed consent, positioning of the patient in the left lateral decubitus position, and conduction of a pre-procedure pause or \"time out\" the endoscope was introduced into the mouth and advanced to the duodenum. Retroflexion was performed at the fundus of the stomach. A careful inspection was made, and findings or interventions are described below.    Findings:   Esophagus: Diaphragmatic impression at 40 cm from the incisors. Z line at 38 cm. 2 cm hiatal hernia. [place]

## 2024-06-03 NOTE — ANESTHESIA PRE PROCEDURE
Department of Anesthesiology  Preprocedure Note       Name:  Nikky Hayes   Age:  64 y.o.  :  1960                                          MRN:  [id number]         Date:  6/3/2024      Surgeon: Surgeon(s):  Ruth Steiner MD    Procedure: Procedure(s):  ESOPHAGOGASTRODUODENOSCOPY DIAGNOSTIC ONLY    Medications prior to admission:   Prior to Admission medications    Medication Sig Start Date End Date Taking? Authorizing Provider   amLODIPine (NORVASC) 2.5 MG tablet Take 1 tablet by mouth daily 3/12/24   Wayne Larose MD   metoprolol succinate (TOPROL XL) 25 MG extended release tablet Take 1 tablet by mouth daily 3/12/24   Wayne Larose MD   vitamin D (ERGOCALCIFEROL) 1.25 MG (69725 UT) CAPS capsule Take 1 capsule by mouth once a week 24   Sherita Bowden MD   cyclobenzaprine (FLEXERIL) 5 MG tablet Take 1 tablet by mouth 3 times daily as needed for Muscle spasms 1/10/24   Sherita Bowden MD   losartan (COZAAR) 100 MG tablet Take 1 tablet by mouth daily 1/10/24   Sherita Bowden MD   glipiZIDE (GLUCOTROL) 10 MG tablet Take 1 tablet by mouth twice daily 23   Sherita Bowden MD   pravastatin (PRAVACHOL) 40 MG tablet TAKE ONE TABLET BY MOUTH NIGHTLY. 23   Sherita Bowden MD   JARDIANCE 25 MG tablet  23   Wayne Larose MD   blood glucose test strips (ONETOUCH ULTRA) strip USE 1 STRIP TO CHECK GLUCOSE TWICE DAILY 22   Wayne Larose MD TOUJEO SOLOSTAR 300 UNIT/ML concentrated injection pen INJECT 140 UNITS BY SUBCUTANEOUS ROUTE DAILY. 23   Laura Marcelo MD   Lancets [facility] Accu Check Luda Smart View Fast Click Drum. DX E11.9 Test blood sugar twice daily 12/9/15   Automatic Reconciliation, Ar   aspirin 81 MG EC tablet Take by mouth daily    Automatic Reconciliation, Ar   omeprazole (PRILOSEC) 20 MG delayed release capsule TAKE ONE CAPSULE BY MOUTH ONCE DAILY 17   Automatic Reconciliation, Ar       Current medications:    No current

## 2024-06-03 NOTE — H&P
[facility]  Outpatient History and Physical    Patient: Nikky Hayes    Physician: Ruth Steiner MD    Vital Signs: Blood pressure (!) 142/66, pulse 55, resp. rate 24, height 1.702 m (5' 7\"), weight 104.8 kg (231 lb), SpO2 96 %.    Allergies:   Allergies   Allergen Reactions    Lisinopril Cough    Metformin Diarrhea    Penicillin G Swelling       Chief Complaint: Heartburn, on PPI for long term, recent non cardiac chest pain    History of Present Illness: Heartburn, on PPI for long term, recent non cardiac chest pain    Indication for Procedure: Heartburn, on PPI for long term, recent non cardiac chest pain    History:  Past Medical History:   Diagnosis Date    Arthritis     Diabetes mellitus (HCC)     GERD (gastroesophageal reflux disease)     Heart murmur 11/8/2010    Sleep apnea 11/8/2010      Past Surgical History:   Procedure Laterality Date    HEENT      uvuloplasty    HYSTERECTOMY (CERVIX STATUS UNKNOWN)      ORTHOPEDIC SURGERY Left     rotator cuff      Social History     Socioeconomic History    Marital status:      Spouse name: None    Number of children: None    Years of education: None    Highest education level: None   Tobacco Use    Smoking status: Never    Smokeless tobacco: Never   Vaping Use    Vaping Use: Never used   Substance and Sexual Activity    Alcohol use: Yes     Comment: rarely    Drug use: No    Sexual activity: Not Currently     Social Determinants of Health     Financial Resource Strain: Low Risk  (6/30/2023)    Overall Financial Resource Strain (CARDIA)     Difficulty of Paying Living Expenses: Not hard at all   Transportation Needs: Unknown (6/30/2023)    PRAPARE - Transportation     Lack of Transportation (Non-Medical): No   Physical Activity: Inactive (6/30/2023)    Exercise Vital Sign     Days of Exercise per Week: 0 days     Minutes of Exercise per Session: 0 min   Housing Stability: Unknown (6/30/2023)    Housing Stability Vital

## 2024-06-03 NOTE — PROGRESS NOTES
Endoscopy Case End Note:    1509:  Procedure scope was pre-cleaned, per protocol, at bedside by ANTONIA DOUGLAS      1512:  Report received from anesthesia - CATE MILES.  See anesthesia flowsheet for intra-procedure vital signs and events

## 2024-06-03 NOTE — ANESTHESIA POSTPROCEDURE EVALUATION
Department of Anesthesiology  Postprocedure Note    Patient: Nikky Hayes  MRN: [id number]  YOB: 1960  Date of evaluation: 6/3/2024    Procedure Summary       Date: 06/03/24 Room / Location: [facility] ENDO 02 / [facility] ENDOSCOPY    Anesthesia Start: 1458 Anesthesia Stop: 1515    Procedure: ESOPHAGOGASTRODUODENOSCOPY DIAGNOSTIC ONLY (Upper GI Region) Diagnosis:       Chest pain, unspecified type      (Chest pain, unspecified type [R07.9])    Surgeons: Ruth Steiner MD Responsible Provider: Jakob Rader MD    Anesthesia Type: MAC ASA Status: 2            Anesthesia Type: MAC    Peri Phase I: Peri Score: 10    Peri Phase II: Peri Score: 10    Anesthesia Post Evaluation    Patient location during evaluation: floor  Patient participation: complete - patient participated  Level of consciousness: awake  Pain score: 0  Airway patency: patent  Nausea & Vomiting: no nausea and no vomiting  Cardiovascular status: hemodynamically stable  Respiratory status: acceptable  Hydration status: stable    No notable events documented.

## 2024-06-17 RX ORDER — GLIPIZIDE 10 MG/1
10 TABLET ORAL 2 TIMES DAILY
Qty: 60 TABLET | Refills: 5 | Status: SHIPPED | OUTPATIENT
Start: 2024-06-17

## 2024-06-21 ENCOUNTER — TELEPHONE (OUTPATIENT)
Age: 64
End: 2024-06-21

## 2024-06-24 ENCOUNTER — TELEPHONE (OUTPATIENT)
Age: 64
End: 2024-06-24

## 2024-06-24 NOTE — TELEPHONE ENCOUNTER
ROZ I called pt to r/s ed fup appt with a different provider for the next day however she wanted to cancel the whole appt due to her provider being out of the office. Pt has an appt on 7/1. I told her we would see her then.

## 2024-06-28 RX ORDER — INSULIN GLARGINE 300 U/ML
INJECTION, SOLUTION SUBCUTANEOUS
Qty: 18 ML | Refills: 0 | Status: SHIPPED | OUTPATIENT
Start: 2024-06-28

## 2024-07-17 ENCOUNTER — OFFICE VISIT (OUTPATIENT)
Age: 64
End: 2024-07-17
Payer: COMMERCIAL

## 2024-07-17 VITALS
TEMPERATURE: 97.4 F | SYSTOLIC BLOOD PRESSURE: 140 MMHG | WEIGHT: 235 LBS | HEIGHT: 67 IN | DIASTOLIC BLOOD PRESSURE: 74 MMHG | RESPIRATION RATE: 16 BRPM | OXYGEN SATURATION: 95 % | BODY MASS INDEX: 36.88 KG/M2 | HEART RATE: 55 BPM

## 2024-07-17 DIAGNOSIS — Z00.00 ROUTINE ADULT HEALTH MAINTENANCE: Primary | ICD-10-CM

## 2024-07-17 DIAGNOSIS — E78.00 HYPERCHOLESTEROLEMIA: ICD-10-CM

## 2024-07-17 DIAGNOSIS — I11.9 HYPERTENSIVE LEFT VENTRICULAR HYPERTROPHY, WITHOUT HEART FAILURE: ICD-10-CM

## 2024-07-17 DIAGNOSIS — E11.21 TYPE 2 DIABETES WITH NEPHROPATHY (HCC): ICD-10-CM

## 2024-07-17 DIAGNOSIS — E11.42 DM TYPE 2 WITH DIABETIC PERIPHERAL NEUROPATHY (HCC): ICD-10-CM

## 2024-07-17 DIAGNOSIS — B37.9 YEAST INFECTION: ICD-10-CM

## 2024-07-17 DIAGNOSIS — E55.9 VITAMIN D DEFICIENCY, UNSPECIFIED: ICD-10-CM

## 2024-07-17 PROCEDURE — 99397 PER PM REEVAL EST PAT 65+ YR: CPT | Performed by: FAMILY MEDICINE

## 2024-07-17 RX ORDER — FLUCONAZOLE 150 MG/1
150 TABLET ORAL DAILY
Qty: 3 TABLET | Refills: 0 | Status: SHIPPED | OUTPATIENT
Start: 2024-07-17 | End: 2024-07-20

## 2024-07-17 RX ORDER — MELOXICAM 15 MG/1
15 TABLET ORAL DAILY
COMMUNITY
Start: 2024-07-01 | End: 2024-07-31

## 2024-07-17 SDOH — ECONOMIC STABILITY: INCOME INSECURITY: HOW HARD IS IT FOR YOU TO PAY FOR THE VERY BASICS LIKE FOOD, HOUSING, MEDICAL CARE, AND HEATING?: NOT HARD AT ALL

## 2024-07-17 SDOH — ECONOMIC STABILITY: FOOD INSECURITY: WITHIN THE PAST 12 MONTHS, YOU WORRIED THAT YOUR FOOD WOULD RUN OUT BEFORE YOU GOT MONEY TO BUY MORE.: NEVER TRUE

## 2024-07-17 SDOH — ECONOMIC STABILITY: FOOD INSECURITY: WITHIN THE PAST 12 MONTHS, THE FOOD YOU BOUGHT JUST DIDN'T LAST AND YOU DIDN'T HAVE MONEY TO GET MORE.: NEVER TRUE

## 2024-07-17 ASSESSMENT — PATIENT HEALTH QUESTIONNAIRE - PHQ9
SUM OF ALL RESPONSES TO PHQ QUESTIONS 1-9: 0
8. MOVING OR SPEAKING SO SLOWLY THAT OTHER PEOPLE COULD HAVE NOTICED. OR THE OPPOSITE, BEING SO FIGETY OR RESTLESS THAT YOU HAVE BEEN MOVING AROUND A LOT MORE THAN USUAL: NOT AT ALL
5. POOR APPETITE OR OVEREATING: NOT AT ALL
SUM OF ALL RESPONSES TO PHQ QUESTIONS 1-9: 0
10. IF YOU CHECKED OFF ANY PROBLEMS, HOW DIFFICULT HAVE THESE PROBLEMS MADE IT FOR YOU TO DO YOUR WORK, TAKE CARE OF THINGS AT HOME, OR GET ALONG WITH OTHER PEOPLE: NOT DIFFICULT AT ALL
6. FEELING BAD ABOUT YOURSELF - OR THAT YOU ARE A FAILURE OR HAVE LET YOURSELF OR YOUR FAMILY DOWN: NOT AT ALL
SUM OF ALL RESPONSES TO PHQ QUESTIONS 1-9: 0
SUM OF ALL RESPONSES TO PHQ QUESTIONS 1-9: 0
4. FEELING TIRED OR HAVING LITTLE ENERGY: NOT AT ALL
3. TROUBLE FALLING OR STAYING ASLEEP: NOT AT ALL
7. TROUBLE CONCENTRATING ON THINGS, SUCH AS READING THE NEWSPAPER OR WATCHING TELEVISION: NOT AT ALL
1. LITTLE INTEREST OR PLEASURE IN DOING THINGS: NOT AT ALL
2. FEELING DOWN, DEPRESSED OR HOPELESS: NOT AT ALL
SUM OF ALL RESPONSES TO PHQ9 QUESTIONS 1 & 2: 0
9. THOUGHTS THAT YOU WOULD BE BETTER OFF DEAD, OR OF HURTING YOURSELF: NOT AT ALL

## 2024-07-17 NOTE — PROGRESS NOTES
Identified pt with two pt identifiers(name and ).    Chief Complaint   Patient presents with    Annual Exam    Tinnitus     Started about a month ago        Health Maintenance Due   Topic    Shingles vaccine (1 of 2)    DEXA (modify frequency per FRAX score)     Respiratory Syncytial Virus (RSV) Pregnant or age 60 yrs+ (1 - 1-dose 60+ series)    Diabetic foot exam     Pneumococcal 65+ years Vaccine (3 of 3 - PPSV23 or PCV20)    COVID-19 Vaccine (3 - - season)    Diabetic retinal exam     Diabetic Alb to Cr ratio (uACR) test        Wt Readings from Last 3 Encounters:   24 106.6 kg (235 lb)   24 104.8 kg (231 lb)   24 105.7 kg (233 lb)     Temp Readings from Last 3 Encounters:   24 97.4 °F (36.3 °C) (Temporal)   24 97.9 °F (36.6 °C) (Temporal)   24 98.6 °F (37 °C) (Temporal)     BP Readings from Last 3 Encounters:   24 (!) 148/68   24 (!) 110/55   24 136/80     Pulse Readings from Last 3 Encounters:   24 55   24 63   24 62           Depression Screening:  :         2024     2:45 PM 1/10/2024     9:48 AM 2023     4:13 PM 2022     2:20 PM   PHQ-9 Questionaire   Little interest or pleasure in doing things 0 0 0 0   Feeling down, depressed, or hopeless 0 0 0 0   Trouble falling or staying asleep, or sleeping too much 0      Feeling tired or having little energy 0      Poor appetite or overeating 0      Feeling bad about yourself - or that you are a failure or have let yourself or your family down 0      Trouble concentrating on things, such as reading the newspaper or watching television 0      Moving or speaking so slowly that other people could have noticed. Or the opposite - being so fidgety or restless that you have been moving around a lot more than usual 0      Thoughts that you would be better off dead, or of hurting yourself in some way 0      PHQ-9 Total Score 0 0 0 0   If you checked off any problems, how difficult

## 2024-07-17 NOTE — PROGRESS NOTES
Well Adult Note  Name: Nikky Hayes Today’s Date: 2024   MRN: [id number] Sex: Female   Age: 64 y.o. Ethnicity: Non- / Non    : 1960 Race: Black /       Nikky Hayes is here for a well adult exam.       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    Review of Systems   Genitourinary:  Positive for vaginal discharge (recurrent yeast infection).   Patient had recent EGD performed showing hiatal hernia.  Benign gastric polyps.  She remains on omeprazole 20 mg daily.    Allergies   Allergen Reactions    Lisinopril Cough    Metformin Diarrhea    Penicillin G Swelling     Prior to Visit Medications    Medication Sig Taking? Authorizing Provider   meloxicam (MOBIC) 15 MG tablet Take 1 tablet by mouth daily Yes Wayne Laroes MD   fluconazole (DIFLUCAN) 150 MG tablet Take 1 tablet by mouth daily for 3 days Yes Sherita Bowden MD TOUJEO SOLOSTAR 300 UNIT/ML concentrated injection pen INJECT UP  UNITS SUBCUTANEOUSLY DAILY Yes Laura Marcelo MD   glipiZIDE (GLUCOTROL) 10 MG tablet Take 1 tablet by mouth 2 times daily For diabetes Yes Sherita Bowden MD   amLODIPine (NORVASC) 2.5 MG tablet Take 1 tablet by mouth daily Yes Wayne Larose MD   metoprolol succinate (TOPROL XL) 25 MG extended release tablet Take 1 tablet by mouth daily Yes Wayne Larose MD   vitamin D (ERGOCALCIFEROL) 1.25 MG (29292 UT) CAPS capsule Take 1 capsule by mouth once a week Yes Sherita Bowden MD   cyclobenzaprine (FLEXERIL) 5 MG tablet Take 1 tablet by mouth 3 times daily as needed for Muscle spasms Yes Sherita Bowden MD   losartan (COZAAR) 100 MG tablet Take 1 tablet by mouth daily Yes Sherita Bowden MD   pravastatin (PRAVACHOL) 40 MG tablet TAKE ONE TABLET BY MOUTH NIGHTLY. Yes Sherita Bowden MD   JARDIANCE 25 MG tablet  Yes Wayne Larose MD   blood glucose test strips (ONETOUCH ULTRA)

## 2024-07-17 NOTE — PATIENT INSTRUCTIONS
[facility] Transportation Resources*  (Call [employer]/211 if need more resources.)      [facility] [employer] [facility]  What they offer: Provides public transportation to the [facility] and parts of [place] and [facility].  Website: http://www.ridegrtc.com/  Phone Number: 721.321.9635  [employer] [facility] Specialized Services (CARE & CARE Plus)  What they offer: Provides public transportation access to individuals with disabilities who may not reasonably be able to use [facility] [employer] [facility] fixed route bus service. Provides zsghgi-yp-pfsuhktsiux services under the guidelines of the ADA.  Website: http://[employer]/services/specialized-transportation/care  Phone Number: 875.801.9485  [employer]  What they offer: [employer] Connection provides 2 round trip rides/month to non-emergency medical appointments (must qualify)  Website:  https://seniorconnections-va.org/services/support-to-stay-home/ride-connections/  Phone Number: 587.866.9113  Eligibility Requirements: Individuals with disabilities (18+) or older adults (60+) and live in the [facility] or the [facility] of [place], [facility] and [place].  Sliding scale fee system.  You must give 7 days notice to schedules rides, which are subject to availability.      [employer]  What they offer: Donation based transportation services for veterans, families in need, the elderly, and those with disabilities.  Website: https://www.[employer]/  Phone Number: 438.934.2967.  Please allow two weeks notice in scheduling rides, which are subject to availability.    Additional Information: Offers transport to appointments, grocery store, airport, etc. in the areas of [facility], [place], [place], and [place].      [facility] - Community transit service serving [place], [place], [facility], [place], [place], [place], [place], [place], [place],

## 2024-08-17 DIAGNOSIS — B37.9 YEAST INFECTION: ICD-10-CM

## 2024-08-19 DIAGNOSIS — N76.1 CHRONIC VAGINITIS: ICD-10-CM

## 2024-08-19 RX ORDER — FLUCONAZOLE 150 MG/1
TABLET ORAL
Qty: 3 TABLET | Refills: 0 | OUTPATIENT
Start: 2024-08-19

## 2024-08-19 RX ORDER — FLUCONAZOLE 150 MG/1
150 TABLET ORAL DAILY
Qty: 3 TABLET | Refills: 0 | Status: SHIPPED | OUTPATIENT
Start: 2024-08-19 | End: 2024-08-22

## 2024-08-30 ENCOUNTER — LAB (OUTPATIENT)
Age: 64
End: 2024-08-30

## 2024-08-30 DIAGNOSIS — E11.21 TYPE 2 DIABETES WITH NEPHROPATHY (HCC): ICD-10-CM

## 2024-08-30 DIAGNOSIS — E55.9 VITAMIN D DEFICIENCY, UNSPECIFIED: ICD-10-CM

## 2024-08-30 DIAGNOSIS — Z00.00 ROUTINE ADULT HEALTH MAINTENANCE: ICD-10-CM

## 2024-08-31 ENCOUNTER — TELEPHONE (OUTPATIENT)
Age: 64
End: 2024-08-31

## 2024-08-31 LAB
25(OH)D3 SERPL-MCNC: 74.2 NG/ML (ref 30–100)
ALBUMIN SERPL-MCNC: 3.5 G/DL (ref 3.5–5)
ALBUMIN/GLOB SERPL: 1.2 (ref 1.1–2.2)
ALP SERPL-CCNC: 84 U/L (ref 45–117)
ALT SERPL-CCNC: 20 U/L (ref 12–78)
ANION GAP SERPL CALC-SCNC: 5 MMOL/L (ref 5–15)
AST SERPL-CCNC: 10 U/L (ref 15–37)
BASOPHILS # BLD: 0 K/UL (ref 0–0.1)
BASOPHILS NFR BLD: 1 % (ref 0–1)
BILIRUB SERPL-MCNC: 0.4 MG/DL (ref 0.2–1)
BUN SERPL-MCNC: 18 MG/DL (ref 6–20)
BUN/CREAT SERPL: 19 (ref 12–20)
CALCIUM SERPL-MCNC: 9.2 MG/DL (ref 8.5–10.1)
CHLORIDE SERPL-SCNC: 108 MMOL/L (ref 97–108)
CHOLEST SERPL-MCNC: 157 MG/DL
CO2 SERPL-SCNC: 29 MMOL/L (ref 21–32)
CREAT SERPL-MCNC: 0.96 MG/DL (ref 0.55–1.02)
CREAT UR-MCNC: 145 MG/DL
DIFFERENTIAL METHOD BLD: NORMAL
EOSINOPHIL # BLD: 0.2 K/UL (ref 0–0.4)
EOSINOPHIL NFR BLD: 3 % (ref 0–7)
ERYTHROCYTE [DISTWIDTH] IN BLOOD BY AUTOMATED COUNT: 13.3 % (ref 11.5–14.5)
EST. AVERAGE GLUCOSE BLD GHB EST-MCNC: 163 MG/DL
GLOBULIN SER CALC-MCNC: 3 G/DL (ref 2–4)
GLUCOSE SERPL-MCNC: 75 MG/DL (ref 65–100)
HBA1C MFR BLD: 7.3 % (ref 4–5.6)
HCT VFR BLD AUTO: 42.4 % (ref 35–47)
HDLC SERPL-MCNC: 65 MG/DL
HDLC SERPL: 2.4 (ref 0–5)
HGB BLD-MCNC: 13.3 G/DL (ref 11.5–16)
IMM GRANULOCYTES # BLD AUTO: 0 K/UL (ref 0–0.04)
IMM GRANULOCYTES NFR BLD AUTO: 0 % (ref 0–0.5)
LDLC SERPL CALC-MCNC: 75.2 MG/DL (ref 0–100)
LYMPHOCYTES # BLD: 2 K/UL (ref 0.8–3.5)
LYMPHOCYTES NFR BLD: 36 % (ref 12–49)
MCH RBC QN AUTO: 27.8 PG (ref 26–34)
MCHC RBC AUTO-ENTMCNC: 31.4 G/DL (ref 30–36.5)
MCV RBC AUTO: 88.5 FL (ref 80–99)
MICROALBUMIN UR-MCNC: 1.14 MG/DL
MICROALBUMIN/CREAT UR-RTO: 8 MG/G (ref 0–30)
MONOCYTES # BLD: 0.5 K/UL (ref 0–1)
MONOCYTES NFR BLD: 9 % (ref 5–13)
NEUTS SEG # BLD: 2.9 K/UL (ref 1.8–8)
NEUTS SEG NFR BLD: 51 % (ref 32–75)
NRBC # BLD: 0 K/UL (ref 0–0.01)
NRBC BLD-RTO: 0 PER 100 WBC
PLATELET # BLD AUTO: 277 K/UL (ref 150–400)
PMV BLD AUTO: 11.4 FL (ref 8.9–12.9)
POTASSIUM SERPL-SCNC: 4.3 MMOL/L (ref 3.5–5.1)
PROT SERPL-MCNC: 6.5 G/DL (ref 6.4–8.2)
RBC # BLD AUTO: 4.79 M/UL (ref 3.8–5.2)
SODIUM SERPL-SCNC: 142 MMOL/L (ref 136–145)
TRIGL SERPL-MCNC: 84 MG/DL
TSH SERPL DL<=0.05 MIU/L-ACNC: 1.29 UIU/ML (ref 0.36–3.74)
VLDLC SERPL CALC-MCNC: 16.8 MG/DL
WBC # BLD AUTO: 5.6 K/UL (ref 3.6–11)

## 2024-09-03 ENCOUNTER — TELEPHONE (OUTPATIENT)
Age: 64
End: 2024-09-03

## 2024-09-03 NOTE — TELEPHONE ENCOUNTER
----- Message from Dr. Sherita Bowden MD sent at 8/31/2024  6:46 PM EDT -----  Lab results show good A1c level at 7.3.  Normal kidney and liver function.  Normal thyroid test.  Normal urine protein.  Vitamin D level is at goal.  Good cholesterol numbers.  Normal blood cell counts.  Continue on current medicines.  Keep up the good work.  Follow-up in 6 months.

## 2024-09-04 RX ORDER — INSULIN GLARGINE 300 U/ML
INJECTION, SOLUTION SUBCUTANEOUS
Qty: 18 ML | Refills: 5 | Status: SHIPPED | OUTPATIENT
Start: 2024-09-04

## 2024-09-18 ENCOUNTER — TELEPHONE (OUTPATIENT)
Age: 64
End: 2024-09-18

## 2024-09-18 DIAGNOSIS — N76.1 CHRONIC VAGINITIS: ICD-10-CM

## 2024-09-18 RX ORDER — FLUCONAZOLE 150 MG/1
150 TABLET ORAL DAILY
Qty: 3 TABLET | Refills: 0 | Status: SHIPPED | OUTPATIENT
Start: 2024-09-18 | End: 2024-09-21

## 2024-09-28 DIAGNOSIS — I10 PRIMARY HYPERTENSION: ICD-10-CM

## 2024-09-30 RX ORDER — LOSARTAN POTASSIUM 100 MG/1
100 TABLET ORAL DAILY
Qty: 90 TABLET | Refills: 0 | Status: SHIPPED | OUTPATIENT
Start: 2024-09-30

## 2024-11-11 DIAGNOSIS — I10 PRIMARY HYPERTENSION: ICD-10-CM

## 2024-11-11 RX ORDER — LOSARTAN POTASSIUM 100 MG/1
100 TABLET ORAL DAILY
Qty: 90 TABLET | Refills: 0 | Status: SHIPPED | OUTPATIENT
Start: 2024-11-11

## 2024-11-21 DIAGNOSIS — N76.1 CHRONIC VAGINITIS: ICD-10-CM

## 2024-11-21 RX ORDER — FLUCONAZOLE 150 MG/1
TABLET ORAL
Qty: 3 TABLET | Refills: 0 | Status: SHIPPED | OUTPATIENT
Start: 2024-11-21

## 2024-12-20 DIAGNOSIS — N76.1 CHRONIC VAGINITIS: ICD-10-CM

## 2024-12-20 RX ORDER — FLUCONAZOLE 150 MG/1
TABLET ORAL
Qty: 3 TABLET | Refills: 0 | Status: SHIPPED | OUTPATIENT
Start: 2024-12-20

## 2025-01-10 RX ORDER — ERGOCALCIFEROL 1.25 MG/1
50000 CAPSULE, LIQUID FILLED ORAL WEEKLY
Qty: 5 CAPSULE | Refills: 0 | Status: SHIPPED | OUTPATIENT
Start: 2025-01-10

## 2025-01-10 RX ORDER — GLIPIZIDE 10 MG/1
TABLET ORAL
Qty: 60 TABLET | Refills: 0 | Status: SHIPPED | OUTPATIENT
Start: 2025-01-10

## 2025-01-27 DIAGNOSIS — N76.1 CHRONIC VAGINITIS: ICD-10-CM

## 2025-01-27 DIAGNOSIS — I10 PRIMARY HYPERTENSION: ICD-10-CM

## 2025-01-27 RX ORDER — LOSARTAN POTASSIUM 100 MG/1
100 TABLET ORAL DAILY
Qty: 90 TABLET | Refills: 0 | Status: SHIPPED | OUTPATIENT
Start: 2025-01-27

## 2025-01-27 RX ORDER — FLUCONAZOLE 150 MG/1
TABLET ORAL
Qty: 3 TABLET | Refills: 0 | Status: SHIPPED | OUTPATIENT
Start: 2025-01-27

## 2025-01-29 NOTE — TELEPHONE ENCOUNTER
Left voicemail for patient to call office and schedule 6 month med check and labs.    throat clearing/upper respiratory infection/pneumonia/change of breathing pattern/cough/fever/gurgly voice

## 2025-02-15 RX ORDER — ERGOCALCIFEROL 1.25 MG/1
50000 CAPSULE, LIQUID FILLED ORAL WEEKLY
Qty: 5 CAPSULE | Refills: 0 | Status: SHIPPED | OUTPATIENT
Start: 2025-02-15

## 2025-03-10 ENCOUNTER — OFFICE VISIT (OUTPATIENT)
Age: 65
End: 2025-03-10
Payer: COMMERCIAL

## 2025-03-10 ENCOUNTER — LAB (OUTPATIENT)
Age: 65
End: 2025-03-10

## 2025-03-10 VITALS
HEIGHT: 67 IN | SYSTOLIC BLOOD PRESSURE: 128 MMHG | HEART RATE: 58 BPM | BODY MASS INDEX: 35.31 KG/M2 | OXYGEN SATURATION: 93 % | RESPIRATION RATE: 20 BRPM | TEMPERATURE: 97.2 F | WEIGHT: 225 LBS | DIASTOLIC BLOOD PRESSURE: 72 MMHG

## 2025-03-10 DIAGNOSIS — E04.9 THYROID GOITER: ICD-10-CM

## 2025-03-10 DIAGNOSIS — Z79.899 ENCOUNTER FOR LONG-TERM CURRENT USE OF MEDICATION: ICD-10-CM

## 2025-03-10 DIAGNOSIS — I10 PRIMARY HYPERTENSION: Primary | ICD-10-CM

## 2025-03-10 DIAGNOSIS — Z79.4 LONG TERM (CURRENT) USE OF INSULIN (HCC): ICD-10-CM

## 2025-03-10 DIAGNOSIS — E55.9 VITAMIN D DEFICIENCY, UNSPECIFIED: ICD-10-CM

## 2025-03-10 DIAGNOSIS — E78.00 PURE HYPERCHOLESTEROLEMIA, UNSPECIFIED: ICD-10-CM

## 2025-03-10 DIAGNOSIS — G89.29 CHRONIC RIGHT SHOULDER PAIN: ICD-10-CM

## 2025-03-10 DIAGNOSIS — E78.00 HYPERCHOLESTEROLEMIA: ICD-10-CM

## 2025-03-10 DIAGNOSIS — E66.01 MORBID (SEVERE) OBESITY DUE TO EXCESS CALORIES: ICD-10-CM

## 2025-03-10 DIAGNOSIS — J02.9 PHARYNGITIS, UNSPECIFIED ETIOLOGY: ICD-10-CM

## 2025-03-10 DIAGNOSIS — E11.21 TYPE 2 DIABETES WITH NEPHROPATHY (HCC): ICD-10-CM

## 2025-03-10 DIAGNOSIS — E11.42 DM TYPE 2 WITH DIABETIC PERIPHERAL NEUROPATHY (HCC): ICD-10-CM

## 2025-03-10 DIAGNOSIS — Z12.31 ENCOUNTER FOR SCREENING MAMMOGRAM FOR BREAST CANCER: ICD-10-CM

## 2025-03-10 DIAGNOSIS — M25.511 CHRONIC RIGHT SHOULDER PAIN: ICD-10-CM

## 2025-03-10 DIAGNOSIS — N18.30 STAGE 3 CHRONIC KIDNEY DISEASE, UNSPECIFIED WHETHER STAGE 3A OR 3B CKD (HCC): ICD-10-CM

## 2025-03-10 PROCEDURE — 3074F SYST BP LT 130 MM HG: CPT | Performed by: FAMILY MEDICINE

## 2025-03-10 PROCEDURE — 1123F ACP DISCUSS/DSCN MKR DOCD: CPT | Performed by: FAMILY MEDICINE

## 2025-03-10 PROCEDURE — 3078F DIAST BP <80 MM HG: CPT | Performed by: FAMILY MEDICINE

## 2025-03-10 PROCEDURE — 99214 OFFICE O/P EST MOD 30 MIN: CPT | Performed by: FAMILY MEDICINE

## 2025-03-10 RX ORDER — AMLODIPINE BESYLATE 2.5 MG/1
2.5 TABLET ORAL DAILY
Qty: 90 TABLET | Refills: 1 | Status: SHIPPED | OUTPATIENT
Start: 2025-03-10

## 2025-03-10 RX ORDER — GLIPIZIDE 10 MG/1
10 TABLET ORAL
Qty: 60 TABLET | Refills: 5 | Status: SHIPPED | OUTPATIENT
Start: 2025-03-10

## 2025-03-10 RX ORDER — ERGOCALCIFEROL 1.25 MG/1
50000 CAPSULE, LIQUID FILLED ORAL WEEKLY
Qty: 5 CAPSULE | Refills: 5 | Status: SHIPPED | OUTPATIENT
Start: 2025-03-10

## 2025-03-10 RX ORDER — SEMAGLUTIDE 0.68 MG/ML
INJECTION, SOLUTION SUBCUTANEOUS
COMMUNITY
Start: 2024-12-17

## 2025-03-10 RX ORDER — CETIRIZINE HYDROCHLORIDE 10 MG/1
10 TABLET ORAL DAILY
Qty: 30 TABLET | Refills: 3 | Status: SHIPPED | OUTPATIENT
Start: 2025-03-10

## 2025-03-10 RX ORDER — PRAVASTATIN SODIUM 40 MG
40 TABLET ORAL NIGHTLY
Qty: 90 TABLET | Refills: 3 | Status: SHIPPED | OUTPATIENT
Start: 2025-03-10

## 2025-03-10 RX ORDER — EMPAGLIFLOZIN 25 MG/1
25 TABLET, FILM COATED ORAL DAILY
Qty: 30 TABLET | Refills: 5 | Status: SHIPPED | OUTPATIENT
Start: 2025-03-10

## 2025-03-10 RX ORDER — METOPROLOL SUCCINATE 25 MG/1
25 TABLET, EXTENDED RELEASE ORAL DAILY
Qty: 90 TABLET | Refills: 1 | Status: SHIPPED | OUTPATIENT
Start: 2025-03-10

## 2025-03-10 RX ORDER — LOSARTAN POTASSIUM 100 MG/1
100 TABLET ORAL DAILY
Qty: 90 TABLET | Refills: 1 | Status: SHIPPED | OUTPATIENT
Start: 2025-03-10

## 2025-03-10 SDOH — ECONOMIC STABILITY: FOOD INSECURITY: WITHIN THE PAST 12 MONTHS, YOU WORRIED THAT YOUR FOOD WOULD RUN OUT BEFORE YOU GOT MONEY TO BUY MORE.: NEVER TRUE

## 2025-03-10 SDOH — ECONOMIC STABILITY: FOOD INSECURITY: WITHIN THE PAST 12 MONTHS, THE FOOD YOU BOUGHT JUST DIDN'T LAST AND YOU DIDN'T HAVE MONEY TO GET MORE.: NEVER TRUE

## 2025-03-10 ASSESSMENT — PATIENT HEALTH QUESTIONNAIRE - PHQ9
SUM OF ALL RESPONSES TO PHQ QUESTIONS 1-9: 0
1. LITTLE INTEREST OR PLEASURE IN DOING THINGS: NOT AT ALL
5. POOR APPETITE OR OVEREATING: NOT AT ALL
2. FEELING DOWN, DEPRESSED OR HOPELESS: NOT AT ALL
SUM OF ALL RESPONSES TO PHQ QUESTIONS 1-9: 0
4. FEELING TIRED OR HAVING LITTLE ENERGY: NOT AT ALL
SUM OF ALL RESPONSES TO PHQ QUESTIONS 1-9: 0
7. TROUBLE CONCENTRATING ON THINGS, SUCH AS READING THE NEWSPAPER OR WATCHING TELEVISION: NOT AT ALL
10. IF YOU CHECKED OFF ANY PROBLEMS, HOW DIFFICULT HAVE THESE PROBLEMS MADE IT FOR YOU TO DO YOUR WORK, TAKE CARE OF THINGS AT HOME, OR GET ALONG WITH OTHER PEOPLE: NOT DIFFICULT AT ALL
SUM OF ALL RESPONSES TO PHQ QUESTIONS 1-9: 0
3. TROUBLE FALLING OR STAYING ASLEEP: NOT AT ALL
9. THOUGHTS THAT YOU WOULD BE BETTER OFF DEAD, OR OF HURTING YOURSELF: NOT AT ALL
8. MOVING OR SPEAKING SO SLOWLY THAT OTHER PEOPLE COULD HAVE NOTICED. OR THE OPPOSITE, BEING SO FIGETY OR RESTLESS THAT YOU HAVE BEEN MOVING AROUND A LOT MORE THAN USUAL: NOT AT ALL
6. FEELING BAD ABOUT YOURSELF - OR THAT YOU ARE A FAILURE OR HAVE LET YOURSELF OR YOUR FAMILY DOWN: NOT AT ALL

## 2025-03-10 NOTE — PROGRESS NOTES
Identified pt with two pt identifiers(name and )    Chief Complaint   Patient presents with    Hypertension    Diabetes     Patient is here for follow up    Sore Throat     Sore throat on and off since January. Throat is not sore presently.    Neck Pain     Neck pain and spasms that started last month on and off only when she yawns    Referral - General     Ortho on call for right shoulder pain        Health Maintenance Due   Topic    Shingles vaccine (1 of 2)    DEXA (modify frequency per FRAX score)     Respiratory Syncytial Virus (RSV) Pregnant or age 60 yrs+ (1 - Risk 60-74 years 1-dose series)    Diabetic foot exam     Diabetic retinal exam     Flu vaccine (1)    COVID-19 Vaccine (3 - 2024-25 season)       Wt Readings from Last 3 Encounters:   03/10/25 102.1 kg (225 lb)   24 106.6 kg (235 lb)   24 104.8 kg (231 lb)     Temp Readings from Last 3 Encounters:   03/10/25 97.2 °F (36.2 °C) (Temporal)   24 97.4 °F (36.3 °C) (Temporal)   24 97.9 °F (36.6 °C) (Temporal)     BP Readings from Last 3 Encounters:   03/10/25 128/72   24 (!) 140/74   24 (!) 110/55     Pulse Readings from Last 3 Encounters:   03/10/25 58   24 55   24 63           Depression Screening:  :         3/10/2025     3:33 PM 2024     2:45 PM 1/10/2024     9:48 AM 2023     4:13 PM 2022     2:20 PM   PHQ-9 Questionaire   Little interest or pleasure in doing things 0 0 0 0 0   Feeling down, depressed, or hopeless 0 0 0 0 0   Trouble falling or staying asleep, or sleeping too much 0 0      Feeling tired or having little energy 0 0      Poor appetite or overeating 0 0      Feeling bad about yourself - or that you are a failure or have let yourself or your family down 0 0      Trouble concentrating on things, such as reading the newspaper or watching television 0 0      Moving or speaking so slowly that other people could have noticed. Or the opposite - being so fidgety or restless that you 
[facility] through [facility] in 2024.  Current Outpatient Medications   Medication Sig Dispense Refill    OZEMPIC, 0.25 OR 0.5 MG/DOSE, 2 MG/3ML SOPN INJECT 0.25MG SUBCUTANEOUSLY ONCE WEEKLY FOR 4 WEEKS, THEN INCREASE TO 0.5MG WEEKLY      vitamin D (ERGOCALCIFEROL) 1.25 MG (08044 UT) CAPS capsule Take 1 capsule by mouth once a week 5 capsule 0    losartan (COZAAR) 100 MG tablet Take 1 tablet by mouth once daily 90 tablet 0    glipiZIDE (GLUCOTROL) 10 MG tablet TAKE 1 TABLET BY MOUTH TWICE DAILY FOR DIABETES 60 tablet 0    insulin glargine, 1 unit dial, (TOUJEO SOLOSTAR) 300 UNIT/ML concentrated injection pen INJECT UP  UNITS SUBCUTANEOUSLY ONCE DAILY 18 mL 5    amLODIPine (NORVASC) 2.5 MG tablet Take 1 tablet by mouth daily      metoprolol succinate (TOPROL XL) 25 MG extended release tablet Take 1 tablet by mouth daily      cyclobenzaprine (FLEXERIL) 5 MG tablet Take 1 tablet by mouth 3 times daily as needed for Muscle spasms 90 tablet 0    pravastatin (PRAVACHOL) 40 MG tablet TAKE ONE TABLET BY MOUTH NIGHTLY. 90 tablet 3    JARDIANCE 25 MG tablet       blood glucose test strips (ONETOUCH ULTRA) strip USE 1 STRIP TO CHECK GLUCOSE TWICE DAILY      Lancets MISC Accu Check Luda Smart View Fast Click Drum. DX E11.9 Test blood sugar twice daily      aspirin 81 MG EC tablet Take by mouth daily      omeprazole (PRILOSEC) 20 MG delayed release capsule TAKE ONE CAPSULE BY MOUTH ONCE DAILY      fluconazole (DIFLUCAN) 150 MG tablet TAKE 1 TABLET BY MOUTH ONCE DAILY FOR 3 DAYS (Patient not taking: Reported on 3/10/2025) 3 tablet 0    meloxicam (MOBIC) 15 MG tablet Take 1 tablet by mouth daily       No current facility-administered medications for this visit.       Review of Systems   HENT:  Positive for postnasal drip and sore throat.    Musculoskeletal:  Positive for arthralgias and neck pain.       /72 (BP Site: Right Upper Arm, Patient Position: Sitting, BP Cuff Size: Large Adult)   Pulse 58   Temp 97.2 °F

## 2025-03-11 ASSESSMENT — ENCOUNTER SYMPTOMS: SORE THROAT: 1

## 2025-03-14 ENCOUNTER — LAB (OUTPATIENT)
Age: 65
End: 2025-03-14

## 2025-03-14 DIAGNOSIS — Z79.899 ENCOUNTER FOR LONG-TERM CURRENT USE OF MEDICATION: ICD-10-CM

## 2025-03-14 DIAGNOSIS — E11.21 TYPE 2 DIABETES WITH NEPHROPATHY (HCC): ICD-10-CM

## 2025-03-14 DIAGNOSIS — E78.00 HYPERCHOLESTEROLEMIA: ICD-10-CM

## 2025-03-14 DIAGNOSIS — E04.9 THYROID GOITER: ICD-10-CM

## 2025-03-14 DIAGNOSIS — E55.9 VITAMIN D DEFICIENCY, UNSPECIFIED: ICD-10-CM

## 2025-03-15 ENCOUNTER — RESULTS FOLLOW-UP (OUTPATIENT)
Age: 65
End: 2025-03-15

## 2025-03-15 LAB
25(OH)D3 SERPL-MCNC: 78.9 NG/ML (ref 30–100)
ALBUMIN SERPL-MCNC: 3.5 G/DL (ref 3.5–5)
ALBUMIN/GLOB SERPL: 1.1 (ref 1.1–2.2)
ALP SERPL-CCNC: 90 U/L (ref 45–117)
ALT SERPL-CCNC: 21 U/L (ref 12–78)
ANION GAP SERPL CALC-SCNC: 4 MMOL/L (ref 2–12)
AST SERPL-CCNC: 11 U/L (ref 15–37)
BASOPHILS # BLD: 0.03 K/UL (ref 0–0.1)
BASOPHILS NFR BLD: 0.6 % (ref 0–1)
BILIRUB SERPL-MCNC: 0.4 MG/DL (ref 0.2–1)
BUN SERPL-MCNC: 15 MG/DL (ref 6–20)
BUN/CREAT SERPL: 14 (ref 12–20)
CALCIUM SERPL-MCNC: 9.5 MG/DL (ref 8.5–10.1)
CHLORIDE SERPL-SCNC: 105 MMOL/L (ref 97–108)
CHOLEST SERPL-MCNC: 161 MG/DL
CO2 SERPL-SCNC: 29 MMOL/L (ref 21–32)
CREAT SERPL-MCNC: 1.05 MG/DL (ref 0.55–1.02)
CREAT UR-MCNC: 174 MG/DL
DIFFERENTIAL METHOD BLD: NORMAL
EOSINOPHIL # BLD: 0.13 K/UL (ref 0–0.4)
EOSINOPHIL NFR BLD: 2.5 % (ref 0–7)
ERYTHROCYTE [DISTWIDTH] IN BLOOD BY AUTOMATED COUNT: 12.9 % (ref 11.5–14.5)
EST. AVERAGE GLUCOSE BLD GHB EST-MCNC: 169 MG/DL
GLOBULIN SER CALC-MCNC: 3.3 G/DL (ref 2–4)
GLUCOSE SERPL-MCNC: 100 MG/DL (ref 65–100)
HBA1C MFR BLD: 7.5 % (ref 4–5.6)
HCT VFR BLD AUTO: 43.9 % (ref 35–47)
HDLC SERPL-MCNC: 61 MG/DL
HDLC SERPL: 2.6 (ref 0–5)
HGB BLD-MCNC: 13.6 G/DL (ref 11.5–16)
IMM GRANULOCYTES # BLD AUTO: 0.02 K/UL (ref 0–0.04)
IMM GRANULOCYTES NFR BLD AUTO: 0.4 % (ref 0–0.5)
LDLC SERPL CALC-MCNC: 73.2 MG/DL (ref 0–100)
LYMPHOCYTES # BLD: 1.81 K/UL (ref 0.8–3.5)
LYMPHOCYTES NFR BLD: 34.2 % (ref 12–49)
MCH RBC QN AUTO: 27.4 PG (ref 26–34)
MCHC RBC AUTO-ENTMCNC: 31 G/DL (ref 30–36.5)
MCV RBC AUTO: 88.3 FL (ref 80–99)
MICROALBUMIN UR-MCNC: 1.4 MG/DL
MICROALBUMIN/CREAT UR-RTO: 8 MG/G (ref 0–30)
MONOCYTES # BLD: 0.38 K/UL (ref 0–1)
MONOCYTES NFR BLD: 7.2 % (ref 5–13)
NEUTS SEG # BLD: 2.93 K/UL (ref 1.8–8)
NEUTS SEG NFR BLD: 55.1 % (ref 32–75)
NRBC # BLD: 0 K/UL (ref 0–0.01)
NRBC BLD-RTO: 0 PER 100 WBC
PLATELET # BLD AUTO: 305 K/UL (ref 150–400)
PMV BLD AUTO: 11.2 FL (ref 8.9–12.9)
POTASSIUM SERPL-SCNC: 4.8 MMOL/L (ref 3.5–5.1)
PROT SERPL-MCNC: 6.8 G/DL (ref 6.4–8.2)
RBC # BLD AUTO: 4.97 M/UL (ref 3.8–5.2)
SODIUM SERPL-SCNC: 138 MMOL/L (ref 136–145)
SPECIMEN HOLD: NORMAL
T4 FREE SERPL-MCNC: 1 NG/DL (ref 0.8–1.5)
TRIGL SERPL-MCNC: 134 MG/DL
TSH SERPL DL<=0.05 MIU/L-ACNC: 1.56 UIU/ML (ref 0.36–3.74)
VLDLC SERPL CALC-MCNC: 26.8 MG/DL
WBC # BLD AUTO: 5.3 K/UL (ref 3.6–11)

## 2025-03-17 NOTE — TELEPHONE ENCOUNTER
Faxed labs to endocrinologist and sent [employer] message with lab results and recommendations

## 2025-03-18 ENCOUNTER — TELEPHONE (OUTPATIENT)
Age: 65
End: 2025-03-18

## 2025-03-24 DIAGNOSIS — N76.1 CHRONIC VAGINITIS: ICD-10-CM

## 2025-03-24 RX ORDER — FLUCONAZOLE 150 MG/1
TABLET ORAL
Qty: 3 TABLET | Refills: 0 | Status: SHIPPED | OUTPATIENT
Start: 2025-03-24

## 2025-05-13 DIAGNOSIS — N76.1 CHRONIC VAGINITIS: ICD-10-CM

## 2025-05-13 RX ORDER — FLUCONAZOLE 150 MG/1
TABLET ORAL
Qty: 3 TABLET | Refills: 0 | Status: SHIPPED | OUTPATIENT
Start: 2025-05-13

## 2025-06-18 DIAGNOSIS — N76.1 CHRONIC VAGINITIS: ICD-10-CM

## 2025-06-18 RX ORDER — FLUCONAZOLE 150 MG/1
TABLET ORAL
Qty: 3 TABLET | Refills: 0 | Status: SHIPPED | OUTPATIENT
Start: 2025-06-18

## 2025-07-25 ENCOUNTER — TELEPHONE (OUTPATIENT)
Age: 65
End: 2025-07-25

## 2025-07-25 DIAGNOSIS — L02.426 FURUNCLE OF LEFT THIGH: Primary | ICD-10-CM

## 2025-07-25 RX ORDER — SULFAMETHOXAZOLE AND TRIMETHOPRIM 800; 160 MG/1; MG/1
1 TABLET ORAL 2 TIMES DAILY
Qty: 20 TABLET | Refills: 0 | Status: SHIPPED | OUTPATIENT
Start: 2025-07-25 | End: 2025-08-04

## 2025-07-25 NOTE — TELEPHONE ENCOUNTER
ON CALL NOTE:    Patient called complaining of red angry boil left groin area present x 1 week.  It is the size of a quarter coin.  Has been draining for a few days.  Tender.  She is a diabetic.  She would like to avoid going to the ER.  Patient requesting antibiotics.  She is applying wet compresses twice daily.    Plan: I will send order for oral antibiotic.  Continue warm compresses.  She needs appointment to be seen in the office next week.  If she develops fever, chills, red streaks around the area she should go to the emergency room right away.

## 2025-07-28 ENCOUNTER — TELEPHONE (OUTPATIENT)
Age: 65
End: 2025-07-28

## 2025-07-28 ENCOUNTER — TELEMEDICINE (OUTPATIENT)
Age: 65
End: 2025-07-28
Payer: COMMERCIAL

## 2025-07-28 DIAGNOSIS — L02.426 FURUNCLE OF LEFT THIGH: Primary | ICD-10-CM

## 2025-07-28 PROCEDURE — 1123F ACP DISCUSS/DSCN MKR DOCD: CPT | Performed by: FAMILY MEDICINE

## 2025-07-28 PROCEDURE — 99212 OFFICE O/P EST SF 10 MIN: CPT | Performed by: FAMILY MEDICINE

## 2025-07-28 NOTE — PROGRESS NOTES
Nikky Hayes, was evaluated through a synchronous (real-time) audio-video encounter. The patient (or guardian if applicable) is aware that this is a billable service, which includes applicable co-pays. This Virtual Visit was conducted with patient's (and/or legal guardian's) consent. Patient identification was verified, and a caregiver was present when appropriate.   The patient was located at Home: [id number] [place] [address] [place] [address]  Provider was located at Facility (Appt Dept): [address] [place] [id number]  Confirm you are appropriately licensed, registered, or certified to deliver care in the state where the patient is located as indicated above. If you are not or unsure, please re-schedule the visit: Yes, I confirm.     Nikky Hayes (:  1960) is a Established patient, presenting virtually for evaluation of the following:      Below is the assessment and plan developed based on review of pertinent history, physical exam, labs, studies, and medications.     Assessment & Plan  Furuncle of left thigh   Cont course of antibiotics.  Apply warm wet compresses 2-3 times a day.  If worsen, refer to general surgery.         Return in about 1 week (around 2025).       Subjective   HPI    Patient developed a boil left groin area over a week ago.  She had called for advice last Friday.  I started her on oral antibiotics.  It is still draining.  Perhaps less redness of surrounding skin.  She would like to schedule follow-up visit to reexamine.  Patient sustained a fall at home  hurting her shoulder.  She went to [facility] and they referred her to [facility] Kellen Carranza.      Review of Systems       Objective   Patient-Reported Vitals  Patient-Reported Weight: 225lbs  Patient-Reported Height: 5'7       Physical Exam             --Sherita Bowden MD

## 2025-07-28 NOTE — PROGRESS NOTES
Identified pt with two pt identifiers(name and )    Chief Complaint   Patient presents with    Referral - General     Referral for boil        Health Maintenance Due   Topic    DEXA (modify frequency per FRAX score)     Respiratory Syncytial Virus (RSV) Pregnant or age 60 yrs+ (1 - Risk 60-74 years 1-dose series)    Diabetic foot exam     COVID-19 Vaccine ( season)       Wt Readings from Last 3 Encounters:   03/10/25 102.1 kg (225 lb)   24 106.6 kg (235 lb)   24 104.8 kg (231 lb)     Temp Readings from Last 3 Encounters:   03/10/25 97.2 °F (36.2 °C) (Temporal)   24 97.4 °F (36.3 °C) (Temporal)   24 97.9 °F (36.6 °C) (Temporal)     BP Readings from Last 3 Encounters:   03/10/25 128/72   24 (!) 140/74   24 (!) 110/55     Pulse Readings from Last 3 Encounters:   03/10/25 58   24 55   24 63           Depression Screening:  :         3/10/2025     3:33 PM 2024     2:45 PM 1/10/2024     9:48 AM 2023     4:13 PM 2022     2:20 PM   PHQ-9 Questionaire   Little interest or pleasure in doing things 0 0 0 0 0   Feeling down, depressed, or hopeless 0 0 0 0 0   Trouble falling or staying asleep, or sleeping too much 0 0      Feeling tired or having little energy 0 0      Poor appetite or overeating 0 0      Feeling bad about yourself - or that you are a failure or have let yourself or your family down 0 0      Trouble concentrating on things, such as reading the newspaper or watching television 0 0      Moving or speaking so slowly that other people could have noticed. Or the opposite - being so fidgety or restless that you have been moving around a lot more than usual 0 0      Thoughts that you would be better off dead, or of hurting yourself in some way 0 0      PHQ-9 Total Score 0 0 0 0 0   If you checked off any problems, how difficult have these problems made it for you to do your work, take care of things at home, or get along with other people? 0

## 2025-07-28 NOTE — TELEPHONE ENCOUNTER
Called patient to schedule a virtual visit, per Dr Bowden and patient declined. She stated that virtual visits do not work out in the area where she lives.

## 2025-08-04 ENCOUNTER — OFFICE VISIT (OUTPATIENT)
Age: 65
End: 2025-08-04
Payer: COMMERCIAL

## 2025-08-04 VITALS
HEART RATE: 63 BPM | TEMPERATURE: 98.6 F | OXYGEN SATURATION: 96 % | RESPIRATION RATE: 16 BRPM | BODY MASS INDEX: 35.16 KG/M2 | HEIGHT: 67 IN | DIASTOLIC BLOOD PRESSURE: 70 MMHG | WEIGHT: 224 LBS | SYSTOLIC BLOOD PRESSURE: 138 MMHG

## 2025-08-04 DIAGNOSIS — L02.426 FURUNCLE OF LEFT THIGH: Primary | ICD-10-CM

## 2025-08-04 DIAGNOSIS — E11.42 DM TYPE 2 WITH DIABETIC PERIPHERAL NEUROPATHY (HCC): ICD-10-CM

## 2025-08-04 DIAGNOSIS — N76.1 CHRONIC VAGINITIS: ICD-10-CM

## 2025-08-04 LAB — HBA1C MFR BLD: 8.3 %

## 2025-08-04 PROCEDURE — 1123F ACP DISCUSS/DSCN MKR DOCD: CPT | Performed by: FAMILY MEDICINE

## 2025-08-04 PROCEDURE — 83036 HEMOGLOBIN GLYCOSYLATED A1C: CPT | Performed by: FAMILY MEDICINE

## 2025-08-04 PROCEDURE — 99213 OFFICE O/P EST LOW 20 MIN: CPT | Performed by: FAMILY MEDICINE

## 2025-08-04 PROCEDURE — 3052F HG A1C>EQUAL 8.0%<EQUAL 9.0%: CPT | Performed by: FAMILY MEDICINE

## 2025-08-04 RX ORDER — SEMAGLUTIDE 0.68 MG/ML
0.25 INJECTION, SOLUTION SUBCUTANEOUS
Qty: 3 ML | Refills: 0
Start: 2025-08-04

## 2025-08-04 RX ORDER — FLUCONAZOLE 150 MG/1
TABLET ORAL
Qty: 3 TABLET | Refills: 0 | Status: SHIPPED | OUTPATIENT
Start: 2025-08-04

## 2025-08-04 ASSESSMENT — PATIENT HEALTH QUESTIONNAIRE - PHQ9
SUM OF ALL RESPONSES TO PHQ QUESTIONS 1-9: 0
SUM OF ALL RESPONSES TO PHQ QUESTIONS 1-9: 0
1. LITTLE INTEREST OR PLEASURE IN DOING THINGS: NOT AT ALL
SUM OF ALL RESPONSES TO PHQ QUESTIONS 1-9: 0
2. FEELING DOWN, DEPRESSED OR HOPELESS: NOT AT ALL
SUM OF ALL RESPONSES TO PHQ QUESTIONS 1-9: 0

## 2025-08-05 ENCOUNTER — TELEPHONE (OUTPATIENT)
Age: 65
End: 2025-08-05

## 2025-08-05 DIAGNOSIS — M54.2 NECK PAIN: Primary | ICD-10-CM

## 2025-08-05 RX ORDER — CYCLOBENZAPRINE HCL 5 MG
5 TABLET ORAL 2 TIMES DAILY PRN
Qty: 30 TABLET | Refills: 0 | Status: SHIPPED | OUTPATIENT
Start: 2025-08-05

## 2025-08-21 ENCOUNTER — PATIENT MESSAGE (OUTPATIENT)
Age: 65
End: 2025-08-21

## (undated) DEVICE — ROSEN CURVED WIRE GUIDE: Brand: ROSEN

## (undated) DEVICE — CATHETER KIT JL4 JR4 5FR 100CM 145 PGTL DXTERITY

## (undated) DEVICE — HEART CATH-SFMC: Brand: MEDLINE INDUSTRIES, INC.

## (undated) DEVICE — FORCEP BX LG CAP 2.4 MMX120 CM W/ NDL YEL RADIAL JAW 4 DISP

## (undated) DEVICE — IV START KIT: Brand: MEDLINE

## (undated) DEVICE — CUFF BLD PRSS AD CLTH SGL TB W/ BAYNT CONN ROUNDED CORNER

## (undated) DEVICE — SET GRAV CK VLV NEEDLESS ST 3 GANGED 4WAY STPCOCK HI FLO 10

## (undated) DEVICE — SUPPORT WRST AD W3.5XL9IN DIA14.5IN ART SFT ADJ HK AND LOOP

## (undated) DEVICE — ENDOSCOPIC KIT COMPLIANCE ENDOKIT

## (undated) DEVICE — BITEBLOCK 54FR W/ DENT RIM BLOX

## (undated) DEVICE — TIP SUCT TRNSPAR RIB SURF STD BLB RIG NVENT W/ 5IN1 CONN DYND50138] MEDLINE INDUSTRIES INC]

## (undated) DEVICE — GLIDESHEATH SLENDER ACCESS KIT: Brand: GLIDESHEATH SLENDER

## (undated) DEVICE — TR BAND RADIAL ARTERY COMPRESSION DEVICE: Brand: TR BAND

## (undated) DEVICE — COVIDIEN KENDALL DL DISPOSABLE 3 LEAD SY: Brand: MEDLINE RENEWAL

## (undated) DEVICE — CATHETER IV 20GA L1.16IN OD1.0414-1.1176MM ID0.762-0.8382MM

## (undated) DEVICE — CONTAINER SPEC 20 ML LID NEUT BUFF FORMALIN 10 % POLYPR STS